# Patient Record
Sex: MALE | Race: WHITE | NOT HISPANIC OR LATINO | URBAN - METROPOLITAN AREA
[De-identification: names, ages, dates, MRNs, and addresses within clinical notes are randomized per-mention and may not be internally consistent; named-entity substitution may affect disease eponyms.]

---

## 2022-03-01 ENCOUNTER — EMERGENCY (EMERGENCY)
Facility: HOSPITAL | Age: 88
LOS: 1 days | Discharge: HOME | End: 2022-03-01
Attending: EMERGENCY MEDICINE
Payer: MEDICARE

## 2022-03-01 VITALS
OXYGEN SATURATION: 98 % | HEART RATE: 90 BPM | RESPIRATION RATE: 18 BRPM | DIASTOLIC BLOOD PRESSURE: 55 MMHG | TEMPERATURE: 99 F | SYSTOLIC BLOOD PRESSURE: 120 MMHG

## 2022-03-01 VITALS
OXYGEN SATURATION: 98 % | TEMPERATURE: 99 F | DIASTOLIC BLOOD PRESSURE: 70 MMHG | RESPIRATION RATE: 18 BRPM | SYSTOLIC BLOOD PRESSURE: 154 MMHG | HEART RATE: 96 BPM

## 2022-03-01 DIAGNOSIS — Z95.1 PRESENCE OF AORTOCORONARY BYPASS GRAFT: Chronic | ICD-10-CM

## 2022-03-01 LAB
ALBUMIN SERPL ELPH-MCNC: 3 G/DL — LOW (ref 3.5–5)
ALP SERPL-CCNC: 104 U/L — SIGNIFICANT CHANGE UP (ref 40–120)
ALT FLD-CCNC: 27 U/L DA — SIGNIFICANT CHANGE UP (ref 10–60)
ANION GAP SERPL CALC-SCNC: 7 MMOL/L — SIGNIFICANT CHANGE UP (ref 5–17)
APTT BLD: 31.1 SEC — SIGNIFICANT CHANGE UP (ref 27.5–35.5)
AST SERPL-CCNC: 22 U/L — SIGNIFICANT CHANGE UP (ref 10–40)
BASOPHILS # BLD AUTO: 0.04 K/UL — SIGNIFICANT CHANGE UP (ref 0–0.2)
BASOPHILS NFR BLD AUTO: 0.4 % — SIGNIFICANT CHANGE UP (ref 0–2)
BILIRUB SERPL-MCNC: 0.5 MG/DL — SIGNIFICANT CHANGE UP (ref 0.2–1.2)
BUN SERPL-MCNC: 29 MG/DL — HIGH (ref 7–18)
CALCIUM SERPL-MCNC: 7.9 MG/DL — LOW (ref 8.4–10.5)
CHLORIDE SERPL-SCNC: 103 MMOL/L — SIGNIFICANT CHANGE UP (ref 96–108)
CO2 SERPL-SCNC: 28 MMOL/L — SIGNIFICANT CHANGE UP (ref 22–31)
CREAT SERPL-MCNC: 1.08 MG/DL — SIGNIFICANT CHANGE UP (ref 0.5–1.3)
EGFR: 61 ML/MIN/1.73M2 — SIGNIFICANT CHANGE UP
EOSINOPHIL # BLD AUTO: 0.07 K/UL — SIGNIFICANT CHANGE UP (ref 0–0.5)
EOSINOPHIL NFR BLD AUTO: 0.8 % — SIGNIFICANT CHANGE UP (ref 0–6)
GLUCOSE SERPL-MCNC: 108 MG/DL — HIGH (ref 70–99)
HCT VFR BLD CALC: 27.1 % — LOW (ref 39–50)
HGB BLD-MCNC: 9 G/DL — LOW (ref 13–17)
IMM GRANULOCYTES NFR BLD AUTO: 0.3 % — SIGNIFICANT CHANGE UP (ref 0–1.5)
INR BLD: 1.65 RATIO — HIGH (ref 0.88–1.16)
LYMPHOCYTES # BLD AUTO: 1.63 K/UL — SIGNIFICANT CHANGE UP (ref 1–3.3)
LYMPHOCYTES # BLD AUTO: 17.9 % — SIGNIFICANT CHANGE UP (ref 13–44)
MCHC RBC-ENTMCNC: 32.8 PG — SIGNIFICANT CHANGE UP (ref 27–34)
MCHC RBC-ENTMCNC: 33.2 GM/DL — SIGNIFICANT CHANGE UP (ref 32–36)
MCV RBC AUTO: 98.9 FL — SIGNIFICANT CHANGE UP (ref 80–100)
MONOCYTES # BLD AUTO: 0.97 K/UL — HIGH (ref 0–0.9)
MONOCYTES NFR BLD AUTO: 10.7 % — SIGNIFICANT CHANGE UP (ref 2–14)
NEUTROPHILS # BLD AUTO: 6.35 K/UL — SIGNIFICANT CHANGE UP (ref 1.8–7.4)
NEUTROPHILS NFR BLD AUTO: 69.9 % — SIGNIFICANT CHANGE UP (ref 43–77)
NRBC # BLD: 0 /100 WBCS — SIGNIFICANT CHANGE UP (ref 0–0)
NT-PROBNP SERPL-SCNC: 3533 PG/ML — HIGH (ref 0–450)
PLATELET # BLD AUTO: 264 K/UL — SIGNIFICANT CHANGE UP (ref 150–400)
POTASSIUM SERPL-MCNC: 4.5 MMOL/L — SIGNIFICANT CHANGE UP (ref 3.5–5.3)
POTASSIUM SERPL-SCNC: 4.5 MMOL/L — SIGNIFICANT CHANGE UP (ref 3.5–5.3)
PROT SERPL-MCNC: 6.3 G/DL — SIGNIFICANT CHANGE UP (ref 6–8.3)
PROTHROM AB SERPL-ACNC: 19.7 SEC — HIGH (ref 10.5–13.4)
RBC # BLD: 2.74 M/UL — LOW (ref 4.2–5.8)
RBC # FLD: 12.6 % — SIGNIFICANT CHANGE UP (ref 10.3–14.5)
SODIUM SERPL-SCNC: 138 MMOL/L — SIGNIFICANT CHANGE UP (ref 135–145)
TROPONIN I, HIGH SENSITIVITY RESULT: 11.3 NG/L — SIGNIFICANT CHANGE UP
TROPONIN I, HIGH SENSITIVITY RESULT: 14.6 NG/L — SIGNIFICANT CHANGE UP
WBC # BLD: 9.09 K/UL — SIGNIFICANT CHANGE UP (ref 3.8–10.5)
WBC # FLD AUTO: 9.09 K/UL — SIGNIFICANT CHANGE UP (ref 3.8–10.5)

## 2022-03-01 PROCEDURE — 99285 EMERGENCY DEPT VISIT HI MDM: CPT | Mod: 25

## 2022-03-01 PROCEDURE — 85025 COMPLETE CBC W/AUTO DIFF WBC: CPT

## 2022-03-01 PROCEDURE — 85730 THROMBOPLASTIN TIME PARTIAL: CPT

## 2022-03-01 PROCEDURE — 84484 ASSAY OF TROPONIN QUANT: CPT

## 2022-03-01 PROCEDURE — 99283 EMERGENCY DEPT VISIT LOW MDM: CPT

## 2022-03-01 PROCEDURE — 85610 PROTHROMBIN TIME: CPT

## 2022-03-01 PROCEDURE — 80053 COMPREHEN METABOLIC PANEL: CPT

## 2022-03-01 PROCEDURE — 71045 X-RAY EXAM CHEST 1 VIEW: CPT

## 2022-03-01 PROCEDURE — 83880 ASSAY OF NATRIURETIC PEPTIDE: CPT

## 2022-03-01 PROCEDURE — 36415 COLL VENOUS BLD VENIPUNCTURE: CPT

## 2022-03-01 PROCEDURE — 71045 X-RAY EXAM CHEST 1 VIEW: CPT | Mod: 26

## 2022-03-01 PROCEDURE — 93005 ELECTROCARDIOGRAM TRACING: CPT

## 2022-03-01 NOTE — ED PROVIDER NOTE - PATIENT PORTAL LINK FT
You can access the FollowMyHealth Patient Portal offered by [facility] by registering at the following website: [email or web address]. By joining [employer]’s FollowMyHealth portal, you will also be able to view your health information using other applications (apps) compatible with our system.

## 2022-03-01 NOTE — ED PROVIDER NOTE - OBJECTIVE STATEMENT
89 y/o man, h/o HTN, A-fib, CABG, sent from [place] for c/o chest pain.  Pt denies any chest pain or any symptoms on eval here.  No SOB/fever/cough.

## 2022-03-01 NOTE — ED PROVIDER NOTE - CLINICAL SUMMARY MEDICAL DECISION MAKING FREE TEXT BOX
89 y/o man, h/o HTN, A-fib, CABG, sent from [place] for c/o chest pain.  Pt denies any chest pain or any symptoms on eval here--labs, EKG, CXR, reassess.

## 2022-03-01 NOTE — ED PROVIDER NOTE - PROGRESS NOTE DETAILS
Pt resting comfortably, denies any pain or symptoms while here, and normal and calm behavior.  Will arrange ambulance back to [place]. Pt resting comfortably, denies any pain or symptoms while here, and normal and calm behavior.  Spoke with Pt's daughter, Siomara.  Will arrange ambulance back to [place].

## 2022-03-01 NOTE — ED ADULT NURSE NOTE - NSIMPLEMENTINTERV_GEN_ALL_ED
Implemented All Fall with Harm Risk Interventions:  [place] to call system. Call bell, personal items and telephone within reach. Instruct patient to call for assistance. Room bathroom lighting operational. Non-slip footwear when patient is off stretcher. Physically safe environment: no spills, clutter or unnecessary equipment. Stretcher in lowest position, wheels locked, appropriate side rails in place. Provide visual cue, wrist band, yellow gown, etc. Monitor gait and stability. Monitor for mental status changes and reorient to person, place, and time. Review medications for side effects contributing to fall risk. Reinforce activity limits and safety measures with patient and family. Provide visual clues: red socks.

## 2022-03-01 NOTE — ED PROVIDER NOTE - NSFOLLOWUPINSTRUCTIONS_ED_ALL_ED_FT
Nonspecific Chest Pain, Adult      Chest pain can be caused by many different conditions. It can be caused by a condition that is life-threatening and requires treatment right away. It can also be caused by something that is not life-threatening. If you have chest pain, it can be hard to know the difference, so it is important to get help right away to make sure that you do not have a serious condition.    Some life-threatening causes of chest pain include:  •Heart attack.      •A tear in the body's main blood vessel (aortic dissection).      •Inflammation around your heart (pericarditis).      •A problem in the lungs, such as a blood clot (pulmonary embolism) or a collapsed lung (pneumothorax).      Some non life-threatening causes of chest pain include:  •Heartburn.      •Anxiety or stress.      •Damage to the bones, muscles, and cartilage that make up your chest wall.      •Pneumonia or bronchitis.      •Shingles infection (varicella-zoster virus).      Chest pain can feel like:  •Pain or discomfort on the surface of your chest or deep in your chest.      •Crushing, pressure, aching, or squeezing pain.      •Burning or tingling.      •Dull or sharp pain that is worse when you move, cough, or take a deep breath.      •Pain or discomfort that is also felt in your back, neck, jaw, shoulder, or arm, or pain that spreads to any of these areas.      Your chest pain may come and go. It may also be constant. Your health care provider will do lab tests and other studies to find the cause of your pain. Treatment will depend on the cause of your chest pain.      Follow these instructions at home:    Medicines     •Take over-the-counter and prescription medicines only as told by your health care provider.      •If you were prescribed an antibiotic, take it as told by your health care provider. Do not stop taking the antibiotic even if you start to feel better.        Lifestyle      •Rest as directed by your health care provider.      • Do not use any products that contain nicotine or tobacco, such as cigarettes and e-cigarettes. If you need help quitting, ask your health care provider.      • Do not drink alcohol.    •Make healthy lifestyle choices as recommended. These may include:  •Getting regular exercise. Ask your health care provider to suggest some activities that are safe for you.      •Eating a heart-healthy diet. This includes plenty of fresh fruits and vegetables, whole grains, low-fat (lean) protein, and low-fat dairy products. A dietitian can help you find healthy eating options.      •Maintaining a healthy weight.      •Managing any other health conditions you have, such as high blood pressure (hypertension) or diabetes.      •Reducing stress, such as with yoga or relaxation techniques.        General instructions     •Pay attention to any changes in your symptoms. Tell your health care provider about them or any new symptoms.      •Avoid any activities that cause chest pain.      •Keep all follow-up visits as told by your health care provider. This is important. This includes visits for any further testing if your chest pain does not go away.        Contact a health care provider if:    •Your chest pain does not go away.      •You feel depressed.      •You have a fever.        Get help right away if:    •Your chest pain gets worse.      •You have a cough that gets worse, or you cough up blood.      •You have severe pain in your abdomen.      •You faint.      •You have sudden, unexplained chest discomfort.      •You have sudden, unexplained discomfort in your arms, back, neck, or jaw.      •You have shortness of breath at any time.      •You suddenly start to sweat, or your skin gets clammy.      •You feel nausea or you vomit.      •You suddenly feel lightheaded or dizzy.      •You have severe weakness, or unexplained weakness or fatigue.      •Your heart begins to beat quickly, or it feels like it is skipping beats.      These symptoms may represent a serious problem that is an emergency. Do not wait to see if the symptoms will go away. Get medical help right away. Call your local emergency services (911 in the [place] Do not drive yourself to the hospital.       Summary    •Chest pain can be caused by a condition that is serious and requires urgent treatment. It may also be caused by something that is not life-threatening.      •If you have chest pain, it is very important to see your health care provider. Your health care provider may do lab tests and other studies to find the cause of your pain.      •Follow your health care provider's instructions on taking medicines, making lifestyle changes, and getting emergency treatment if symptoms become worse.      •Keep all follow-up visits as told by your health care provider. This includes visits for any further testing if your chest pain does not go away.      This information is not intended to replace advice given to you by your health care provider. Make sure you discuss any questions you have with your health care provider.      Document Revised: 06/20/2019 Document Reviewed: 06/20/2019    Elsevier Patient Education © 2021 [employer]

## 2022-06-05 ENCOUNTER — EMERGENCY (EMERGENCY)
Facility: HOSPITAL | Age: 88
LOS: 1 days | Discharge: HOME | End: 2022-06-05
Attending: EMERGENCY MEDICINE
Payer: MEDICARE

## 2022-06-05 VITALS
WEIGHT: 115.08 LBS | TEMPERATURE: 98 F | RESPIRATION RATE: 17 BRPM | DIASTOLIC BLOOD PRESSURE: 71 MMHG | HEART RATE: 68 BPM | HEIGHT: 67 IN | OXYGEN SATURATION: 97 % | SYSTOLIC BLOOD PRESSURE: 170 MMHG

## 2022-06-05 DIAGNOSIS — Z95.1 PRESENCE OF AORTOCORONARY BYPASS GRAFT: Chronic | ICD-10-CM

## 2022-06-05 PROBLEM — Z86.79 PERSONAL HISTORY OF OTHER DISEASES OF THE CIRCULATORY SYSTEM: Chronic | Status: ACTIVE | Noted: 2022-03-01

## 2022-06-05 LAB
ALBUMIN SERPL ELPH-MCNC: 3.6 G/DL — SIGNIFICANT CHANGE UP (ref 3.5–5)
ALP SERPL-CCNC: 102 U/L — SIGNIFICANT CHANGE UP (ref 40–120)
ALT FLD-CCNC: 18 U/L DA — SIGNIFICANT CHANGE UP (ref 10–60)
ANION GAP SERPL CALC-SCNC: 4 MMOL/L — LOW (ref 5–17)
AST SERPL-CCNC: 17 U/L — SIGNIFICANT CHANGE UP (ref 10–40)
BASOPHILS # BLD AUTO: 0.08 K/UL — SIGNIFICANT CHANGE UP (ref 0–0.2)
BASOPHILS NFR BLD AUTO: 1.1 % — SIGNIFICANT CHANGE UP (ref 0–2)
BILIRUB SERPL-MCNC: 0.5 MG/DL — SIGNIFICANT CHANGE UP (ref 0.2–1.2)
BUN SERPL-MCNC: 25 MG/DL — HIGH (ref 7–18)
CALCIUM SERPL-MCNC: 8.7 MG/DL — SIGNIFICANT CHANGE UP (ref 8.4–10.5)
CHLORIDE SERPL-SCNC: 108 MMOL/L — SIGNIFICANT CHANGE UP (ref 96–108)
CO2 SERPL-SCNC: 24 MMOL/L — SIGNIFICANT CHANGE UP (ref 22–31)
CREAT SERPL-MCNC: 1.3 MG/DL — SIGNIFICANT CHANGE UP (ref 0.5–1.3)
EGFR: 48 ML/MIN/1.73M2 — LOW
EOSINOPHIL # BLD AUTO: 0.16 K/UL — SIGNIFICANT CHANGE UP (ref 0–0.5)
EOSINOPHIL NFR BLD AUTO: 2.3 % — SIGNIFICANT CHANGE UP (ref 0–6)
GLUCOSE SERPL-MCNC: 96 MG/DL — SIGNIFICANT CHANGE UP (ref 70–99)
HCT VFR BLD CALC: 28 % — LOW (ref 39–50)
HGB BLD-MCNC: 9.4 G/DL — LOW (ref 13–17)
IMM GRANULOCYTES NFR BLD AUTO: 0.4 % — SIGNIFICANT CHANGE UP (ref 0–1.5)
LYMPHOCYTES # BLD AUTO: 1.98 K/UL — SIGNIFICANT CHANGE UP (ref 1–3.3)
LYMPHOCYTES # BLD AUTO: 28.1 % — SIGNIFICANT CHANGE UP (ref 13–44)
MCHC RBC-ENTMCNC: 33 PG — SIGNIFICANT CHANGE UP (ref 27–34)
MCHC RBC-ENTMCNC: 33.6 GM/DL — SIGNIFICANT CHANGE UP (ref 32–36)
MCV RBC AUTO: 98.2 FL — SIGNIFICANT CHANGE UP (ref 80–100)
MONOCYTES # BLD AUTO: 1.05 K/UL — HIGH (ref 0–0.9)
MONOCYTES NFR BLD AUTO: 14.9 % — HIGH (ref 2–14)
NEUTROPHILS # BLD AUTO: 3.74 K/UL — SIGNIFICANT CHANGE UP (ref 1.8–7.4)
NEUTROPHILS NFR BLD AUTO: 53.2 % — SIGNIFICANT CHANGE UP (ref 43–77)
NRBC # BLD: 0 /100 WBCS — SIGNIFICANT CHANGE UP (ref 0–0)
PLATELET # BLD AUTO: 174 K/UL — SIGNIFICANT CHANGE UP (ref 150–400)
POTASSIUM SERPL-MCNC: 4.4 MMOL/L — SIGNIFICANT CHANGE UP (ref 3.5–5.3)
POTASSIUM SERPL-SCNC: 4.4 MMOL/L — SIGNIFICANT CHANGE UP (ref 3.5–5.3)
PROT SERPL-MCNC: 6.7 G/DL — SIGNIFICANT CHANGE UP (ref 6–8.3)
RBC # BLD: 2.85 M/UL — LOW (ref 4.2–5.8)
RBC # FLD: 13 % — SIGNIFICANT CHANGE UP (ref 10.3–14.5)
SODIUM SERPL-SCNC: 136 MMOL/L — SIGNIFICANT CHANGE UP (ref 135–145)
WBC # BLD: 7.04 K/UL — SIGNIFICANT CHANGE UP (ref 3.8–10.5)
WBC # FLD AUTO: 7.04 K/UL — SIGNIFICANT CHANGE UP (ref 3.8–10.5)

## 2022-06-05 PROCEDURE — 70450 CT HEAD/BRAIN W/O DYE: CPT | Mod: 26,MA

## 2022-06-05 PROCEDURE — 85025 COMPLETE CBC W/AUTO DIFF WBC: CPT

## 2022-06-05 PROCEDURE — 99284 EMERGENCY DEPT VISIT MOD MDM: CPT | Mod: 25

## 2022-06-05 PROCEDURE — 80053 COMPREHEN METABOLIC PANEL: CPT

## 2022-06-05 PROCEDURE — 71250 CT THORAX DX C-: CPT | Mod: 26,MA

## 2022-06-05 PROCEDURE — 71250 CT THORAX DX C-: CPT | Mod: MA

## 2022-06-05 PROCEDURE — 70450 CT HEAD/BRAIN W/O DYE: CPT | Mod: MA

## 2022-06-05 PROCEDURE — 99284 EMERGENCY DEPT VISIT MOD MDM: CPT

## 2022-06-05 PROCEDURE — 36415 COLL VENOUS BLD VENIPUNCTURE: CPT

## 2022-06-05 RX ORDER — ACETAMINOPHEN 500 MG
650 TABLET ORAL ONCE
Refills: 0 | Status: COMPLETED | OUTPATIENT
Start: 2022-06-05 | End: 2022-06-05

## 2022-06-05 RX ADMIN — Medication 650 MILLIGRAM(S): at 16:19

## 2022-06-05 NOTE — ED PROVIDER NOTE - OBJECTIVE STATEMENT
89 year old male with past medical history of hypertension and atrial fibrillation, on Eliquis presents to the ED accompanied by his home health aide Abby with complaints of a trip and fall last night. The patient reports that he injured the left side of his chest and is now complaining of pain with movement and deep breaths. The patient denies any other symptoms including headache, dizziness, loss of consciousness, and shortness of breath. NKDA.

## 2022-06-05 NOTE — ED PROVIDER NOTE - CLINICAL SUMMARY MEDICAL DECISION MAKING FREE TEXT BOX
89 year old male presenting to the ED status post mechanical fall with left rib pain. No signs of obvious fracture. Will obtain a CT chest and head, and labs, then reassess.

## 2022-06-05 NOTE — ED PROVIDER NOTE - PATIENT PORTAL LINK FT
You can access the FollowMyHealth Patient Portal offered by [facility] by registering at the following website: [email or web address]. By joining [facility]’s FollowMyHealth portal, you will also be able to view your health information using other applications (apps) compatible with our system.

## 2022-06-05 NOTE — ED ADULT NURSE NOTE - OBJECTIVE STATEMENT
s/p fall last night while getting beer that fell on the floor. denies any LOC r injury to head, c/ left rib pain.

## 2022-06-05 NOTE — ED PROVIDER NOTE - NSICDXPASTMEDICALHX_GEN_ALL_CORE_FT
PAST MEDICAL HISTORY:  Accelerated Hypertension     Coronary artery disease     H/O: hypertension     History of atrial fibrillation

## 2022-06-05 NOTE — ED PROVIDER NOTE - NSICDXPASTSURGICALHX_GEN_ALL_CORE_FT
PAST SURGICAL HISTORY:  History of coronary artery bypass graft     S/P CABG (coronary artery bypass graft)

## 2023-01-03 ENCOUNTER — INPATIENT (INPATIENT)
Facility: HOSPITAL | Age: 89
LOS: 2 days | Discharge: HOME | DRG: 291 | End: 2023-01-06
Attending: INTERNAL MEDICINE | Admitting: INTERNAL MEDICINE
Payer: MEDICARE

## 2023-01-03 VITALS
OXYGEN SATURATION: 97 % | TEMPERATURE: 98 F | RESPIRATION RATE: 18 BRPM | DIASTOLIC BLOOD PRESSURE: 103 MMHG | SYSTOLIC BLOOD PRESSURE: 219 MMHG | HEART RATE: 78 BPM | WEIGHT: 149.91 LBS
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
## 2023-01-03 DIAGNOSIS — I48.20 CHRONIC ATRIAL FIBRILLATION, UNSPECIFIED: ICD-10-CM

## 2023-01-03 DIAGNOSIS — Z95.1 PRESENCE OF AORTOCORONARY BYPASS GRAFT: Chronic | ICD-10-CM

## 2023-01-03 DIAGNOSIS — N40.0 BENIGN PROSTATIC HYPERPLASIA WITHOUT LOWER URINARY TRACT SYMPTOMS: ICD-10-CM

## 2023-01-03 DIAGNOSIS — I16.1 HYPERTENSIVE EMERGENCY: ICD-10-CM

## 2023-01-03 DIAGNOSIS — J18.9 PNEUMONIA, UNSPECIFIED ORGANISM: ICD-10-CM

## 2023-01-03 DIAGNOSIS — J96.01 ACUTE RESPIRATORY FAILURE WITH HYPOXIA: ICD-10-CM

## 2023-01-03 DIAGNOSIS — I10 ESSENTIAL (PRIMARY) HYPERTENSION: ICD-10-CM

## 2023-01-03 DIAGNOSIS — Z29.9 ENCOUNTER FOR PROPHYLACTIC MEASURES, UNSPECIFIED: ICD-10-CM

## 2023-01-03 DIAGNOSIS — I63.9 CEREBRAL INFARCTION, UNSPECIFIED: ICD-10-CM

## 2023-01-03 LAB
ALBUMIN SERPL ELPH-MCNC: 3.5 G/DL — SIGNIFICANT CHANGE UP (ref 3.5–5)
ALP SERPL-CCNC: 92 U/L — SIGNIFICANT CHANGE UP (ref 40–120)
ALT FLD-CCNC: 24 U/L DA — SIGNIFICANT CHANGE UP (ref 10–60)
ANION GAP SERPL CALC-SCNC: 9 MMOL/L — SIGNIFICANT CHANGE UP (ref 5–17)
APPEARANCE UR: CLEAR — SIGNIFICANT CHANGE UP
APTT BLD: 42.4 SEC — HIGH (ref 27.5–35.5)
AST SERPL-CCNC: 19 U/L — SIGNIFICANT CHANGE UP (ref 10–40)
BASOPHILS # BLD AUTO: 0.08 K/UL — SIGNIFICANT CHANGE UP (ref 0–0.2)
BASOPHILS NFR BLD AUTO: 1.2 % — SIGNIFICANT CHANGE UP (ref 0–2)
BILIRUB SERPL-MCNC: 0.8 MG/DL — SIGNIFICANT CHANGE UP (ref 0.2–1.2)
BILIRUB UR-MCNC: NEGATIVE — SIGNIFICANT CHANGE UP
BUN SERPL-MCNC: 21 MG/DL — HIGH (ref 7–18)
CALCIUM SERPL-MCNC: 9.1 MG/DL — SIGNIFICANT CHANGE UP (ref 8.4–10.5)
CHLORIDE SERPL-SCNC: 108 MMOL/L — SIGNIFICANT CHANGE UP (ref 96–108)
CO2 SERPL-SCNC: 25 MMOL/L — SIGNIFICANT CHANGE UP (ref 22–31)
COLOR SPEC: YELLOW — SIGNIFICANT CHANGE UP
CREAT SERPL-MCNC: 1.24 MG/DL — SIGNIFICANT CHANGE UP (ref 0.5–1.3)
DIFF PNL FLD: ABNORMAL
EGFR: 51 ML/MIN/1.73M2 — LOW
EOSINOPHIL # BLD AUTO: 0.1 K/UL — SIGNIFICANT CHANGE UP (ref 0–0.5)
EOSINOPHIL NFR BLD AUTO: 1.5 % — SIGNIFICANT CHANGE UP (ref 0–6)
FLUAV AG NPH QL: SIGNIFICANT CHANGE UP
FLUBV AG NPH QL: SIGNIFICANT CHANGE UP
GLUCOSE SERPL-MCNC: 107 MG/DL — HIGH (ref 70–99)
GLUCOSE UR QL: NEGATIVE — SIGNIFICANT CHANGE UP
HCT VFR BLD CALC: 30.3 % — LOW (ref 39–50)
HGB BLD-MCNC: 9.8 G/DL — LOW (ref 13–17)
IMM GRANULOCYTES NFR BLD AUTO: 0.3 % — SIGNIFICANT CHANGE UP (ref 0–0.9)
INR BLD: 1.45 RATIO — HIGH (ref 0.88–1.16)
KETONES UR-MCNC: NEGATIVE — SIGNIFICANT CHANGE UP
LACTATE SERPL-SCNC: 1.5 MMOL/L — SIGNIFICANT CHANGE UP (ref 0.7–2)
LEUKOCYTE ESTERASE UR-ACNC: NEGATIVE — SIGNIFICANT CHANGE UP
LYMPHOCYTES # BLD AUTO: 1.32 K/UL — SIGNIFICANT CHANGE UP (ref 1–3.3)
LYMPHOCYTES # BLD AUTO: 20.1 % — SIGNIFICANT CHANGE UP (ref 13–44)
MCHC RBC-ENTMCNC: 32.3 GM/DL — SIGNIFICANT CHANGE UP (ref 32–36)
MCHC RBC-ENTMCNC: 33.4 PG — SIGNIFICANT CHANGE UP (ref 27–34)
MCV RBC AUTO: 103.4 FL — HIGH (ref 80–100)
MONOCYTES # BLD AUTO: 0.65 K/UL — SIGNIFICANT CHANGE UP (ref 0–0.9)
MONOCYTES NFR BLD AUTO: 9.9 % — SIGNIFICANT CHANGE UP (ref 2–14)
NEUTROPHILS # BLD AUTO: 4.39 K/UL — SIGNIFICANT CHANGE UP (ref 1.8–7.4)
NEUTROPHILS NFR BLD AUTO: 67 % — SIGNIFICANT CHANGE UP (ref 43–77)
NITRITE UR-MCNC: NEGATIVE — SIGNIFICANT CHANGE UP
NRBC # BLD: 0 /100 WBCS — SIGNIFICANT CHANGE UP (ref 0–0)
NT-PROBNP SERPL-SCNC: 4274 PG/ML — HIGH (ref 0–450)
PH UR: 5 — SIGNIFICANT CHANGE UP (ref 5–8)
PLATELET # BLD AUTO: 160 K/UL — SIGNIFICANT CHANGE UP (ref 150–400)
POTASSIUM SERPL-MCNC: 4.7 MMOL/L — SIGNIFICANT CHANGE UP (ref 3.5–5.3)
POTASSIUM SERPL-SCNC: 4.7 MMOL/L — SIGNIFICANT CHANGE UP (ref 3.5–5.3)
PROT SERPL-MCNC: 7 G/DL — SIGNIFICANT CHANGE UP (ref 6–8.3)
PROT UR-MCNC: 15
PROTHROM AB SERPL-ACNC: 17.3 SEC — HIGH (ref 10.5–13.4)
RBC # BLD: 2.93 M/UL — LOW (ref 4.2–5.8)
RBC # FLD: 13.4 % — SIGNIFICANT CHANGE UP (ref 10.3–14.5)
SARS-COV-2 RNA SPEC QL NAA+PROBE: SIGNIFICANT CHANGE UP
SODIUM SERPL-SCNC: 142 MMOL/L — SIGNIFICANT CHANGE UP (ref 135–145)
SP GR SPEC: 1.01 — SIGNIFICANT CHANGE UP (ref 1.01–1.02)
TROPONIN I, HIGH SENSITIVITY RESULT: 37.2 NG/L — SIGNIFICANT CHANGE UP
UROBILINOGEN FLD QL: NEGATIVE — SIGNIFICANT CHANGE UP
WBC # BLD: 6.56 K/UL — SIGNIFICANT CHANGE UP (ref 3.8–10.5)
WBC # FLD AUTO: 6.56 K/UL — SIGNIFICANT CHANGE UP (ref 3.8–10.5)

## 2023-01-03 PROCEDURE — 71045 X-RAY EXAM CHEST 1 VIEW: CPT | Mod: 26

## 2023-01-03 PROCEDURE — 70450 CT HEAD/BRAIN W/O DYE: CPT | Mod: 26,MA

## 2023-01-03 PROCEDURE — 99285 EMERGENCY DEPT VISIT HI MDM: CPT

## 2023-01-03 RX ORDER — METOPROLOL TARTRATE 50 MG
25 TABLET ORAL DAILY
Refills: 0 | Status: DISCONTINUED | OUTPATIENT
Start: 2023-01-03 | End: 2023-01-06

## 2023-01-03 RX ORDER — FUROSEMIDE 40 MG
20 TABLET ORAL DAILY
Refills: 0 | Status: DISCONTINUED | OUTPATIENT
Start: 2023-01-03 | End: 2023-01-03

## 2023-01-03 RX ORDER — ACETAMINOPHEN 500 MG
650 TABLET ORAL EVERY 6 HOURS
Refills: 0 | Status: DISCONTINUED | OUTPATIENT
Start: 2023-01-03 | End: 2023-01-06

## 2023-01-03 RX ORDER — CEFTRIAXONE 500 MG/1
1000 INJECTION, POWDER, FOR SOLUTION INTRAMUSCULAR; INTRAVENOUS ONCE
Refills: 0 | Status: COMPLETED | OUTPATIENT
Start: 2023-01-03 | End: 2023-01-03

## 2023-01-03 RX ORDER — LANOLIN ALCOHOL/MO/W.PET/CERES
3 CREAM (GRAM) TOPICAL AT BEDTIME
Refills: 0 | Status: DISCONTINUED | OUTPATIENT
Start: 2023-01-03 | End: 2023-01-06

## 2023-01-03 RX ORDER — TAMSULOSIN HYDROCHLORIDE 0.4 MG/1
0.4 CAPSULE ORAL AT BEDTIME
Refills: 0 | Status: DISCONTINUED | OUTPATIENT
Start: 2023-01-03 | End: 2023-01-06

## 2023-01-03 RX ORDER — APIXABAN 2.5 MG/1
5 TABLET, FILM COATED ORAL EVERY 12 HOURS
Refills: 0 | Status: DISCONTINUED | OUTPATIENT
Start: 2023-01-04 | End: 2023-01-06

## 2023-01-03 RX ORDER — AMLODIPINE BESYLATE 2.5 MG/1
2.5 TABLET ORAL DAILY
Refills: 0 | Status: DISCONTINUED | OUTPATIENT
Start: 2023-01-03 | End: 2023-01-05

## 2023-01-03 RX ADMIN — CEFTRIAXONE 100 MILLIGRAM(S): 500 INJECTION, POWDER, FOR SOLUTION INTRAMUSCULAR; INTRAVENOUS at 15:27

## 2023-01-03 RX ADMIN — AMLODIPINE BESYLATE 2.5 MILLIGRAM(S): 2.5 TABLET ORAL at 23:18

## 2023-01-03 RX ADMIN — Medication 25 MILLIGRAM(S): at 23:18

## 2023-01-03 RX ADMIN — Medication 20 MILLIGRAM(S): at 15:27

## 2023-01-03 NOTE — H&P ADULT - NSHPPHYSICALEXAM_GEN_ALL_CORE
Vital Signs Last 24 Hrs  T(C): 36.5 (03 Jan 2023 15:39), Max: 36.7 (03 Jan 2023 11:35)  T(F): 97.7 (03 Jan 2023 15:39), Max: 98 (03 Jan 2023 11:35)  HR: 76 (03 Jan 2023 19:05) (72 - 78)  BP: 194/76 (03 Jan 2023 19:05) (194/76 - 219/103)  BP(mean): --  RR: 18 (03 Jan 2023 19:05) (18 - 20)  SpO2: 92% (03 Jan 2023 19:05) (92% - 98%)    Parameters below as of 03 Jan 2023 19:05  Patient On (Oxygen Delivery Method): nasal cannula  O2 Flow (L/min): 2    GENERAL: NAD, lying in bed comfortably  HEAD:  Atraumatic, Normocephalic  EYES: EOMI, PERRLA, conjunctiva and sclera clear  ENT: Moist mucous membranes  NECK: Supple, No JVD  CHEST/LUNG: Clear to auscultation bilaterally; No rales, rhonchi, wheezing, or rubs. Unlabored respirations (+) 2L saturating %  HEART: (+) Irregular rhythm, but rate controlled  ABDOMEN: Bowel sounds present; Soft, Nontender, Nondistended.  EXTREMITIES:  2+ Peripheral Pulses, brisk capillary refill. No clubbing, cyanosis, or edema  NERVOUS SYSTEM:  Alert & Oriented X2-3, (+) significant hearing impairment  MSK: FROM all 4 extremities, full and equal strength  SKIN: No rashes or lesions

## 2023-01-03 NOTE — ED PROVIDER NOTE - OBJECTIVE STATEMENT
89 year old male with hypertension (on Eliquis) is brought into ED by home attendant for altered mental status and low O2 level today. Home attendant reports that patient's wife fell yesterday, and as he was helping her up, the patient himself fell. Home attendant states that today patient appeared to be a little lethargic and had trouble breathing, so she called 911. As far as attendant is aware, patient is DNR/DNI. NKDA. 89 year old male with hypertension (on Eliquis) is brought into ED by home attendant for altered mental status and low O2 level today. Home attendant reports that patient's wife fell yesterday, and as he was helping her up, the patient himself fell. Home attendant states that today patient appeared to be a little lethargic and had trouble breathing, so she called 911. As far as attendant is aware, patient is DNR/DNI. NKDA. Yesterday patient had some trouble breathing as well.   PT has history  of sundowning at night and can climb out.

## 2023-01-03 NOTE — H&P ADULT - PROBLEM SELECTOR PLAN 4
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

## 2023-01-03 NOTE — H&P ADULT - PROBLEM SELECTOR PLAN 3
s/p mechanical fall after trying to help wife up who fell before   CTH shows chronic microvascular changes and right cerebellar infarcts, no change from previous imaging   Fall Precaution   f/u TSH , lipid profile , hgb a1c  PT consulted  CM consulted for increase HHA

## 2023-01-03 NOTE — H&P ADULT - ASSESSMENT
89M coming form home, ambulates with RW, and limited HHA w/ PMHx BPH, HLD, GERD, CAD s/p CABG, CVA, chronic Afib (on ELIquis), and HTN brought in by HHA for AMS and SOB with fall yesterday. Admitted to [facility] for AHRF likely 2/2  HTN emergency 219/103 in the setting of medication noncompliance. CXR shows b/l pleural effusion R>L, concern for parapneumonic effusion 2/2 malignancy shown in prior CT chest in June 2022.   GOC: DNR/DNI 89M coming form home, ambulates with RW, and limited HHA w/ PMHx BPH, HLD, GERD, CAD s/p CABG, CVA, chronic Afib (on ELIquis), and HTN brought in by HHA for AMS and SOB with fall yesterday. Admitted to [facility] for AHRF likely 2/2  HTN emergency 219/103 in the setting of medication noncompliance. CXR shows b/l pleural effusion R>L, concern for parapneumonic effusion 2/2 malignancy shown in prior CT chest in June 2022.   GOC: DNR/DNI.      PRIMARY TEAM TO CONFIRM MEDS IN THE AM.

## 2023-01-03 NOTE — H&P ADULT - PROBLEM SELECTOR PLAN 5
h/o BPH take flomax  c/w home meds h/o CVA in the past with residual deficients, ambulates with RW and significant hearing impairment in the left ear  likely in the setting of chronic afib, currently taking Eliquis 5 BID  and Toprol 25mg    CTH shows chronic microvascular changes and right cerebellar infarcts, no change from previous imaging   c/w home meds

## 2023-01-03 NOTE — ED PROVIDER NOTE - CARE PLAN
1 Principal Discharge DX:	Fall   Principal Discharge DX:	PNA (pneumonia)  Secondary Diagnosis:	Fluid overload  Secondary Diagnosis:	Acute respiratory failure with hypoxia   No

## 2023-01-03 NOTE — ED PROVIDER NOTE - CLINICAL SUMMARY MEDICAL DECISION MAKING FREE TEXT BOX
Patient with altered mental status and fall. Low O2 level. Will obtain labs and CT scan and reassess.

## 2023-01-03 NOTE — H&P ADULT - PROBLEM SELECTOR PLAN 1
p/w SOB x2d  h/o HF, hb 9.8 , proBNP 4274  likely in the setting of HTN emergency in the setting of medication non-compliance   CXR b/l pleural effusion R>L  incentive spirometry and Supple O2 prn   s/p Lasix 20mg IVP in the ED   c/w lasix 20 mg PO   Cardio consult Dr. Rolando LAKE: DNR/ DNI, updated molst in the chart p/w SOB x2d  h/o HF, hb 9.8 , proBNP 4274  likely in the setting of HTN emergency in the setting of medication non-compliance   CXR: b/l pleural effusion R>L, concern for parapneumonic effusion 2/2 malignancy shown in prior CT chest in 06/2022.   incentive spirometry and Supple O2 prn   s/p Lasix 20mg IVP xs1 in the ED   f/u repeat CT chest non-cont to compare RLL malignancy on CT chest 06/2022  will hold off on abx for now no leukocytosis and no fever  titrate O2 as tolerated   Cardio consult Dr. Rolando LAKE: DNR/ DNI, updated molst in the chart

## 2023-01-03 NOTE — H&P ADULT - PROBLEM SELECTOR PLAN 2
p/w hypertensive emergency 219/103, limited ROS d/t hearing impairment, reports SOB    h/o HTN takes isosorbide dinitrate 30mg, Toprol ER 25mg and Amlodipine 2.5mg, unclear medication compliance    BNP 4274, Trop negative x1   CXR b/l pleural effusion R>L  Hypoxia requiring supp O2, currently 2L NC  Cr mildly elevated 1.4 but not NARINDER (baseline 1.2)  c/w home medications of amlodipine , Toprol with parameters   Tele   Will continue to monitor blood pressure  C/w neurochecks  F/u Echo  Cardiologist consulted Rolando Hines p/w hypertensive emergency 219/103, limited ROS d/t hearing impairment, reports SOB    h/o HTN takes isosorbide dinitrate 30mg, Toprol ER 25mg and Amlodipine 2.5mg, unclear medication compliance   EKG: Afib, rate controlled, but prolonged Qc 533 - AVOID meds that can prolong qtc   BNP 4274, Trop negative x1   CXR b/l pleural effusion R>L  Hypoxia requiring supp O2, currently 2L NC  Cr mildly elevated 1.4 but not NARINDER (baseline 1.2)  c/w home medications of amlodipine and Toprol with holding parameters   admit to Tele   Will continue to monitor blood pressure  F/u Echo  Cardiologist consulted Rolando Hines

## 2023-01-03 NOTE — ED PROVIDER NOTE - PROGRESS NOTE DETAILS
CABG 19 year ago  [phone] Siomara daughter in law  tamsulosin .4mg  atorvastatin 40mg  omeprazole 40mg  metoprolol ER succ 25mg  paroxitene 10mg  isosorbide dinitrate 30mg  amlodipine 2.5mg  eliquis 5mg tablet (for stoke years ago) patient awake. sat 100 on 4 liters NC, will continue to trend down. well appearing. admit to tele. xray with pneumonia and effusion

## 2023-01-04 LAB
ALBUMIN SERPL ELPH-MCNC: 3.2 G/DL — LOW (ref 3.5–5)
ALP SERPL-CCNC: 98 U/L — SIGNIFICANT CHANGE UP (ref 40–120)
ALT FLD-CCNC: 22 U/L DA — SIGNIFICANT CHANGE UP (ref 10–60)
ANION GAP SERPL CALC-SCNC: 9 MMOL/L — SIGNIFICANT CHANGE UP (ref 5–17)
AST SERPL-CCNC: 23 U/L — SIGNIFICANT CHANGE UP (ref 10–40)
BASOPHILS # BLD AUTO: 0.04 K/UL — SIGNIFICANT CHANGE UP (ref 0–0.2)
BASOPHILS NFR BLD AUTO: 0.5 % — SIGNIFICANT CHANGE UP (ref 0–2)
BILIRUB SERPL-MCNC: 0.7 MG/DL — SIGNIFICANT CHANGE UP (ref 0.2–1.2)
BUN SERPL-MCNC: 25 MG/DL — HIGH (ref 7–18)
CALCIUM SERPL-MCNC: 9.1 MG/DL — SIGNIFICANT CHANGE UP (ref 8.4–10.5)
CHLORIDE SERPL-SCNC: 106 MMOL/L — SIGNIFICANT CHANGE UP (ref 96–108)
CO2 SERPL-SCNC: 25 MMOL/L — SIGNIFICANT CHANGE UP (ref 22–31)
CREAT SERPL-MCNC: 1.37 MG/DL — HIGH (ref 0.5–1.3)
CULTURE RESULTS: SIGNIFICANT CHANGE UP
EGFR: 45 ML/MIN/1.73M2 — LOW
EOSINOPHIL # BLD AUTO: 0.16 K/UL — SIGNIFICANT CHANGE UP (ref 0–0.5)
EOSINOPHIL NFR BLD AUTO: 2.2 % — SIGNIFICANT CHANGE UP (ref 0–6)
GLUCOSE SERPL-MCNC: 112 MG/DL — HIGH (ref 70–99)
HCT VFR BLD CALC: 28.1 % — LOW (ref 39–50)
HGB BLD-MCNC: 9.3 G/DL — LOW (ref 13–17)
IMM GRANULOCYTES NFR BLD AUTO: 0.3 % — SIGNIFICANT CHANGE UP (ref 0–0.9)
LYMPHOCYTES # BLD AUTO: 1.54 K/UL — SIGNIFICANT CHANGE UP (ref 1–3.3)
LYMPHOCYTES # BLD AUTO: 20.7 % — SIGNIFICANT CHANGE UP (ref 13–44)
MAGNESIUM SERPL-MCNC: 1.4 MG/DL — LOW (ref 1.6–2.6)
MCHC RBC-ENTMCNC: 33.1 GM/DL — SIGNIFICANT CHANGE UP (ref 32–36)
MCHC RBC-ENTMCNC: 33.3 PG — SIGNIFICANT CHANGE UP (ref 27–34)
MCV RBC AUTO: 100.7 FL — HIGH (ref 80–100)
MONOCYTES # BLD AUTO: 1.08 K/UL — HIGH (ref 0–0.9)
MONOCYTES NFR BLD AUTO: 14.5 % — HIGH (ref 2–14)
NEUTROPHILS # BLD AUTO: 4.59 K/UL — SIGNIFICANT CHANGE UP (ref 1.8–7.4)
NEUTROPHILS NFR BLD AUTO: 61.8 % — SIGNIFICANT CHANGE UP (ref 43–77)
NRBC # BLD: 0 /100 WBCS — SIGNIFICANT CHANGE UP (ref 0–0)
PHOSPHATE SERPL-MCNC: 4.1 MG/DL — SIGNIFICANT CHANGE UP (ref 2.5–4.5)
PLATELET # BLD AUTO: 155 K/UL — SIGNIFICANT CHANGE UP (ref 150–400)
POTASSIUM SERPL-MCNC: 3.8 MMOL/L — SIGNIFICANT CHANGE UP (ref 3.5–5.3)
POTASSIUM SERPL-SCNC: 3.8 MMOL/L — SIGNIFICANT CHANGE UP (ref 3.5–5.3)
PROT SERPL-MCNC: 6.5 G/DL — SIGNIFICANT CHANGE UP (ref 6–8.3)
RBC # BLD: 2.79 M/UL — LOW (ref 4.2–5.8)
RBC # FLD: 13 % — SIGNIFICANT CHANGE UP (ref 10.3–14.5)
SODIUM SERPL-SCNC: 140 MMOL/L — SIGNIFICANT CHANGE UP (ref 135–145)
SPECIMEN SOURCE: SIGNIFICANT CHANGE UP
WBC # BLD: 7.43 K/UL — SIGNIFICANT CHANGE UP (ref 3.8–10.5)
WBC # FLD AUTO: 7.43 K/UL — SIGNIFICANT CHANGE UP (ref 3.8–10.5)

## 2023-01-04 RX ORDER — METOPROLOL TARTRATE 50 MG
2.5 TABLET ORAL ONCE
Refills: 0 | Status: COMPLETED | OUTPATIENT
Start: 2023-01-04 | End: 2023-01-04

## 2023-01-04 RX ORDER — BNT162B2 ORIGINAL AND OMICRON BA.4/BA.5 .1125; .1125 MG/2.25ML; MG/2.25ML
0.3 INJECTION, SUSPENSION INTRAMUSCULAR ONCE
Refills: 0 | Status: DISCONTINUED | OUTPATIENT
Start: 2023-01-04 | End: 2023-01-06

## 2023-01-04 RX ORDER — OLANZAPINE 15 MG/1
2.5 TABLET, FILM COATED ORAL ONCE
Refills: 0 | Status: COMPLETED | OUTPATIENT
Start: 2023-01-04 | End: 2023-01-04

## 2023-01-04 RX ADMIN — OLANZAPINE 2.5 MILLIGRAM(S): 15 TABLET, FILM COATED ORAL at 23:29

## 2023-01-04 RX ADMIN — Medication 650 MILLIGRAM(S): at 01:38

## 2023-01-04 RX ADMIN — Medication 2.5 MILLIGRAM(S): at 17:05

## 2023-01-04 RX ADMIN — APIXABAN 5 MILLIGRAM(S): 2.5 TABLET, FILM COATED ORAL at 17:05

## 2023-01-04 RX ADMIN — Medication 10 MILLIGRAM(S): at 12:22

## 2023-01-04 RX ADMIN — APIXABAN 5 MILLIGRAM(S): 2.5 TABLET, FILM COATED ORAL at 05:52

## 2023-01-04 RX ADMIN — TAMSULOSIN HYDROCHLORIDE 0.4 MILLIGRAM(S): 0.4 CAPSULE ORAL at 22:11

## 2023-01-04 RX ADMIN — Medication 3 MILLIGRAM(S): at 02:10

## 2023-01-04 NOTE — CONSULT NOTE ADULT - ASSESSMENT
Mechanical Fall  CABG with MR with AF with CHF  [place]  Anemia  Early dementia  HLD, GERD, BPH        PLAN- observation  Echo  LASIX, Lopressor xarelto  F/U CXR  in 3 days  P/T  Pt DNR and DNI  Family aware

## 2023-01-04 NOTE — PROGRESS NOTE ADULT - PROBLEM SELECTOR PLAN 1
2/2  pleural effusion  -CXR: b/l pleural effusion R>L,   -O2 supplement as needed to maintain O2 sat>95%  no concern for pneumonia  blood cx negative   f/u pulm recs

## 2023-01-04 NOTE — PROGRESS NOTE ADULT - PROBLEM SELECTOR PLAN 2
p/w hypertensive emergency 219/103, limited ROS d/t hearing impairment, reports SOB    h/o HTN takes isosorbide dinitrate 30mg, Toprol ER 25mg and Amlodipine 2.5mg, unclear medication compliance   EKG: Afib, rate controlled, but prolonged Qc 533 - AVOID meds that can prolong qtc   BNP 4274, Trop negative x1   CXR b/l pleural effusion R>L  Hypoxia requiring supp O2, currently 2L NC  Cr mildly elevated 1.4 but not NARINDER (baseline 1.2)  c/w home medications of amlodipine and Toprol with holding parameters   admit to Tele   Will continue to monitor blood pressure  F/u Echo  Cardiologist consulted Rolando Hines RESOLVED  p/w hypertensive emergency 219/103, limited ROS d/t hearing impairment, reports SOB    h/o HTN takes isosorbide dinitrate 30mg, Toprol ER 25mg and Amlodipine 2.5mg, unclear medication compliance   EKG: Afib, rate controlled, but prolonged Qc 533 - AVOID meds that can prolong qtc   BNP 4274, Trop negative x1   CXR b/l pleural effusion R>L  Hypoxia requiring supp O2, currently 2L NC  Cr mildly elevated 1.4 but not NARINDER (baseline 1.2)  c/w home medications of amlodipine and Toprol with holding parameters   admit to Tele   Will continue to monitor blood pressure  F/u Echo  Cardiologist consulted Rolando Hines

## 2023-01-04 NOTE — CONSULT NOTE ADULT - SUBJECTIVE AND OBJECTIVE BOX
PULMONARY CONSULT NOTE      ROSARIO RUIZ  MRN-[id number]    Patient is a 89y old  Male who presents with a chief complaint of AMS, AHRF (2023 19:27)   COVID +ve  Hx chart lab and xrays reviewed  Pt examined in ER      HISTORY OF PRESENT ILLNESS:    Home Medications:  amLODIPine 2.5 mg oral tablet: 1 tab(s) orally once a day (2023 22:53)  atorvastatin 40 mg oral tablet: 1 tab(s) orally once a day (2023 22:50)  Eliquis 5 mg oral tablet: 1 tab(s) orally 2 times a day (2023 22:52)  Flomax 0.4 mg oral capsule: 1 cap(s) orally once a day (2023 22:50)  isosorbide dinitrate 20 mg oral tablet: 1 tab(s) orally 2 times  (2023 22:51)  PARoxetine 10 mg oral tablet: 1 tab(s) orally once a day (2023 22:52)  Toprol-XL 25 mg oral tablet, extended release: 1 tab(s) orally once a day (2023 22:51)      MEDICATIONS  (STANDING):  amLODIPine   Tablet 2.5 milliGRAM(s) Oral daily  apixaban 5 milliGRAM(s) Oral every 12 hours  metoprolol succinate ER 25 milliGRAM(s) Oral daily  PARoxetine 10 milliGRAM(s) Oral daily  tamsulosin 0.4 milliGRAM(s) Oral at bedtime      MEDICATIONS  (PRN):  acetaminophen     Tablet .. 650 milliGRAM(s) Oral every 6 hours PRN Temp greater or equal to 38C (100.4F), Mild Pain (1 - 3)  melatonin 3 milliGRAM(s) Oral at bedtime PRN Insomnia      Allergies    No Known Allergies    Intolerances        PAST MEDICAL & SURGICAL HISTORY:  Accelerated Hypertension      Coronary artery disease      H/O: hypertension      History of atrial fibrillation      S/P CABG (coronary artery bypass graft)      History of coronary artery bypass graft          FAMILY HISTORY:  HTN    DM   IHD   Asthma  COPD     SOCIAL HISTORY SMOKING    ETOH     DRUGS    REVIEW OF SYSTEMS:  CONSTITUTIONAL: No fever, weight loss, or fatigue   EYES: No eye pain, visual disturbances, or discharge  ENT:  No difficulty hearing, tinnitus, vertigo; No sinus or throat pain  NECK: No pain or stiffness   RESPIRATORY:  cough   wheezing   chills   hemoptysis    Shortness of Breath  CARDIOVASCULAR: No chest pain, palpitations, passing out, dizziness, or leg swelling  GASTROINTESTINAL: No abdominal or epigastric pain. No nausea, vomiting, or hematemesis; No diarrhea or constipation. No melena or hematochezia.  GENITOURINARY: No dysuria, frequency, hematuria, or incontinence  NEUROLOGICAL: No headaches, memory loss, loss of strength, numbness, or tremors  SKIN: No itching, burning, rashes, or lesions   LYMPH Nodes: No enlarged glands  ENDOCRINE: No heat or cold intolerance; No hair loss  MUSCULOSKELETAL: No joint pain or swelling; No muscle, back, or extremity pain  PSYCHIATRIC: No depression, anxiety, mood swings, or difficulty sleeping  HEME/LYMPH: No easy bruising, or bleeding gums  ALLERGY AND IMMUNOLOGIC: No hives or eczema      Vital Signs Last 24 Hrs  T(C): 36.4 (2023 08:33), Max: 36.7 (2023 11:35)  T(F): 97.5 (2023 08:33), Max: 98 (2023 11:35)  HR: 68 (2023 08:33) (60 - 78)  BP: 119/68 (2023 08:33) (119/68 - 219/103)  BP(mean): --  RR: 18 (2023 08:33) (18 - 20)  SpO2: 98% (2023 08:33) (92% - 98%)    Parameters below as of 2023 08:33  Patient On (Oxygen Delivery Method): nasal cannula  O2 Flow (L/min): 3    I&O's Detail      PHYSICAL EXAMINATION:    GENERAL: The patient is a well-developed, well-nourished in no apparent distress.   SKIN: No rashes ecchymoses or cyanosis  HEENT: Head is normocephalic and atraumatic. Extraocular muscles are intact. Mucous membranes are moist.   Neck supple LN not felt, JVP not increased  Thyroid not enlarged  Lymphatic: No lymphadenopathy  Cardiovascular:  S1 S2  heard ,RSR , JVP not increased , syst murmur at apex, No  gallop or rub  Respiratory:  Symmetrical chest wall movements Breathing vesicular , Percussion note normal no dulness   with   rales   wheeze  ABDOMEN:  Soft, Non-tender,   No  hepatosplenomegaly ,BS positive		  Extremities: Normal range of motion, No clubbing, cyanosis or edema , No calf tenderness  Vascular: Peripheral pulses palpable 2+ bilaterally  CNS: Alert and oriented x3,  Mood and affect appropriate  Cranial nerves intact  sensory intact  motor Power5/5, DTR 2+   Babinski neg    LABS:                        9.3    7.43  )-----------( 155      ( 2023 06:58 )             28.1     01-04    140  |  106  |  25<H>  ----------------------------<  112<H>  3.8   |  25  |  1.37<H>    Ca    9.1      2023 06:58  Phos  4.1     -  Mg     1.4     -04    TPro  6.5  /  Alb  3.2<L>  /  TBili  0.7  /  DBili  x   /  AST  23  /  ALT  22  /  AlkPhos  98  01-04    PT/INR - ( 2023 14:05 )   PT: 17.3 sec;   INR: 1.45 ratio         PTT - ( 2023 14:05 )  PTT:42.4 sec  Urinalysis Basic - ( 2023 16:35 )    Color: Yellow / Appearance: Clear / S.010 / pH: x  Gluc: x / Ketone: Negative  / Bili: Negative / Urobili: Negative   Blood: x / Protein: 15 / Nitrite: Negative   Leuk Esterase: Negative / RBC: 5-10 /HPF / WBC 0-2 /HPF   Sq Epi: x / Non Sq Epi: Occasional /HPF / Bacteria: Trace /HPF      Troponin I, High Sensitivity Result: 37.2 ng/L (23 @ 14:05)      Serum Pro-Brain Natriuretic Peptide: 4274 pg/mL (23 @ 14:05)    Lactate, Blood: 1.5 mmol/L (23 @ 14:05)      RADIOLOGY & ADDITIONAL STUDIES:    CXR: Rt Pl Effusion      ekg;    echo: PULMONARY CONSULT NOTE      ROSARIO RUIZ  MRN-[id number]    Patient is a 89y old  Male who presents with a chief complaint of  mechanical fall ,no LOC no fever. cough or sob  Hx chart lab and xrays reviewed  Pt examined in ER      HISTORY OF PRESENT ILLNESS:89M coming form home, ambulates with RW, and limited HHA w/ PMHx BPH, HLD, GERD, CAD s/p CABG, CVA, chronic Afib (on ELIquis), and HTN brought in by HHA for AMS and SOB with fall yesterday. Per the HHA, patient was trying to help his wife up after she fell and he fell on top of her, injuring his left arm, since then he has been not himself. HHA notes that he walks and talks, responsible for taking his own medications, however unclear if has been taking all of his medications in the evening as patient does not have HHA at night. Upon EMS arrival found to be hypoxic and placed on NRBM with elevated BP. Patient is AOx2-3 with significant hearing impairment with hearing aid in the left ear, but able to make his own decisions. Per the son, Williams Ruiz (HCP) the visiting NP has noted increasing BP but with no intervention. In the ED, patient decided to continue care with DNR/DNI, family is in agreement. ROS limited d/t hearing impairment but denies HA, chest pain, and abdominal pain. 89M coming form home, ambulates with RW, and limited HHA w/ PMHx BPH, HLD, GERD, CAD s/p CABG, CVA, chronic Afib (on ELIquis), and HTN brought in by HHA for AMS and SOB with fall yesterday. Per the HHA, patient was trying to help his wife up after she fell and he fell on top of her, injuring his left arm, since then he has been not himself. HHA notes that he walks and talks, responsible for taking his own medications, however unclear if has been taking all of his medications in the evening as patient does not have HHA at night. Upon EMS arrival found to be hypoxic and placed on NRBM with elevated BP. Patient is AOx2-3 with significant hearing impairment with hearing aid in the left ear, but able to make his own decisions. Per the son, Williams Ruiz (HCP) the visiting NP has noted increasing BP but with no intervention. In the ED, patient decided to continue care with DNR/DNI, family is in agreement. ROS limited d/t hearing impairment but denies HA, chest pain, and abdominal pain.      Home Medications:  amLODIPine 2.5 mg oral tablet: 1 tab(s) orally once a day (2023 22:53)  atorvastatin 40 mg oral tablet: 1 tab(s) orally once a day (2023 22:50)  Eliquis 5 mg oral tablet: 1 tab(s) orally 2 times a day (2023 22:52)  Flomax 0.4 mg oral capsule: 1 cap(s) orally once a day (2023 22:50)  isosorbide dinitrate 20 mg oral tablet: 1 tab(s) orally 2 times  (2023 22:51)  PARoxetine 10 mg oral tablet: 1 tab(s) orally once a day (2023 22:52)  Toprol-XL 25 mg oral tablet, extended release: 1 tab(s) orally once a day (2023 22:51)      MEDICATIONS  (STANDING):  amLODIPine   Tablet 2.5 milliGRAM(s) Oral daily  apixaban 5 milliGRAM(s) Oral every 12 hours  metoprolol succinate ER 25 milliGRAM(s) Oral daily  PARoxetine 10 milliGRAM(s) Oral daily  tamsulosin 0.4 milliGRAM(s) Oral at bedtime      MEDICATIONS  (PRN):  acetaminophen     Tablet .. 650 milliGRAM(s) Oral every 6 hours PRN Temp greater or equal to 38C (100.4F), Mild Pain (1 - 3)  melatonin 3 milliGRAM(s) Oral at bedtime PRN Insomnia      Allergies    No Known Allergies            PAST MEDICAL & SURGICAL HISTORY:  Accelerated Hypertension      Coronary artery disease      H/O: hypertension      History of atrial fibrillation      S/P CABG (coronary artery bypass graft)      History of coronary artery bypass graft          FAMILY HISTORY:  HTN --   DM--   IHD--   Asthma--  COPD--     SOCIAL HISTORY SMOKING --   ETOH--     DRUGS--    REVIEW OF SYSTEMS:  CONSTITUTIONAL: No fever, weight loss, or fatigue   EYES: No eye pain, visual disturbances, or discharge  ENT:   [place]++, no, tinnitus, vertigo; No sinus or throat pain  NECK: No pain or stiffness   RESPIRATORY:  cough--   wheezing--   chills--   hemoptysis--    Shortness of Breath--  CARDIOVASCULAR: No chest pain, palpitations, passing out, dizziness, or leg swelling  GASTROINTESTINAL: No abdominal or epigastric pain. No nausea, vomiting, or hematemesis;   LYMPH Nodes: No enlarged glands  ENDOCRINE: No heat or cold intolerance; No hair loss  MUSCULOSKELETAL: No joint pain or swelling; No muscle, back, or extremity pain  HEME/LYMPH: No easy bruising, or bleeding gums  ALLERGY AND IMMUNOLOGIC: No hives or eczema      Vital Signs Last 24 Hrs  T(C): 36.4 (2023 08:33), Max: 36.7 (2023 11:35)  T(F): 97.5 (2023 08:33), Max: 98 (2023 11:35)  HR: 68 (2023 08:33) (60 - 78)  BP: 119/68 (2023 08:33) (119/68 - 219/103)  BP(mean): --  RR: 18 (2023 08:33) (18 - 20)  SpO2: 98% (2023 08:33) (92% - 98%)    Parameters below as of 2023 08:33  Patient On (Oxygen Delivery Method): nasal cannula  O2 Flow (L/min): 3    I&O's Detail      PHYSICAL EXAMINATION:    GENERAL: The patient is a thin built w/m in no apparent distress.   SKIN: No rashes ecchymoses or cyanosis  HEENT: Head is normocephalic and atraumatic. Extraocular muscles are intact. [place]+  Neck supple LN not felt, JVP not increased  Thyroid not enlarged  Lymphatic: No lymphadenopathy  Cardiovascular:  S1 S2  heard , AF , JVP not increased , systolic  murmur at apex, No  gallop or rub  Respiratory:  Symmetrical chest wall movements Breathing vesicular , Percussion note normal no dulness   with no  rales or  wheeze  ABDOMEN:  Soft, Non-tender,  No  hepatosplenomegaly ,BS positive		  Extremities: Normal range of motion, No clubbing, cyanosis or edema , No calf tenderness, scar rt forearm  Vascular: Peripheral pulses palpable 2+ bilaterally  CNS: Alert and  responsive  [place]  Cranial nerves intact  sensory intact  motor Power5/5, DTR 2+  Babinski neg        LABS:                        9.3    7.43  )-----------( 155      ( 2023 06:58 )             28.1     -04    140  |  106  |  25<H>  ----------------------------<  112<H>  3.8   |  25  |  1.37<H>    Ca    9.1      2023 06:58  Phos  4.1       Mg     1.4         TPro  6.5  /  Alb  3.2<L>  /  TBili  0.7  /  DBili  x   /  AST  23  /  ALT  22  /  AlkPhos  98  -    PT/INR - ( 2023 14:05 )   PT: 17.3 sec;   INR: 1.45 ratio         PTT - ( 2023 14:05 )  PTT:42.4 sec  Urinalysis Basic - ( 2023 16:35 )    Color: Yellow / Appearance: Clear / S.010 / pH: x  Gluc: x / Ketone: Negative  / Bili: Negative / Urobili: Negative   Blood: x / Protein: 15 / Nitrite: Negative   Leuk Esterase: Negative / RBC: 5-10 /HPF / WBC 0-2 /HPF   Sq Epi: x / Non Sq Epi: Occasional /HPF / Bacteria: Trace /HPF      Troponin I, High Sensitivity Result: 37.2 ng/L (23 @ 14:05)      Serum Pro-Brain Natriuretic Peptide: 4274 pg/mL (23 @ 14:05)    Lactate, Blood: 1.5 mmol/L (23 @ 14:05)      RADIOLOGY & ADDITIONAL STUDIES:    CXR:  Sternotomy wires again noted.  New small to moderate size right pleural effusion . Trace left pleural effusion.     CT HEAD:  < from: CT Head No Cont (23 @ 14:25) >  Parenchymal volume loss and chronic microvascular ischemic changes are   again seen and unchanged.    Abnormal area of low-attenuation involving the high left posterior   frontal/parietal cortex is again seen which is likely compatible with an   old infarct.    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    Evaluation of the osseous structures with the appropriate window appears   normal    Minimal mucosal thickening is seen involving the posterior left ethmoid   sinus    Both mastoid and middle ear regions appear clear.    Patient is status post cataract surgery on the right side. Bilateral   scleral banding is seen.    < end of copied text >  < from: CT Head No Cont (23 @ 14:25) >  Parenchymal volume loss and chronic microvascular ischemic changes are   again seen and unchanged.    Abnormal area of low-attenuation involving the high left posterior   frontal/parietal cortex is again seen which is likely compatible with an   old infarct.    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    Evaluation of the osseous structures with the appropriate window appears   normal    Minimal mucosal thickening is seen involving the posterior left ethmoid   sinus    Both mastoid and middle ear regions appear clear.    Patient is status post cataract surgery on the right side. Bilateral   scleral banding is seen.        ekg; AF< from: 12 Lead ECG (22 @ 01:10) >  Atrial fibrillation with rapid ventricular response  Right bundle branch block

## 2023-01-04 NOTE — PATIENT PROFILE ADULT - NSPROHARDOFHEAROTHER_GEN_ALL_CORE
-Concern for oral cold sore flare however not evident on exam today--discussed may be related to underlying Sjogren's  -Recommend return to clinic if develops flare so can test w/ PCR  -Rx valacyclovir to trial --use as needed   A magnifying glass may help. Son reports that he doesn't hear anything from the right ear. Has hearing aid to left ear but it's not working as per family.

## 2023-01-04 NOTE — PROGRESS NOTE ADULT - PROBLEM SELECTOR PLAN 1
likely due to pleural effusion  -CXR: b/l pleural effusion R>L, concern for parapneumonic effusion 2/2 malignancy shown in prior CT chest in 06/2022.   -O2 supplement as needed to maintain O2 sat>95%  -f/u repeat CT chest non-cont to compare RLL malignancy on CT chest 06/2022  -Incentive spirometry and Supple O2 prn   -s/p Lasix 20mg IVP xs1 in the ED   -Strict   -Pulm Dr. Hancock  -Cardio Dr. Marshall   [facility]: DNR/ DNI, updated molst in the chart likely due to pleural effusion  -CXR: b/l pleural effusion R>L, concern for parapneumonic effusion 2/2 malignancy shown in prior CT chest in 06/2022.   -O2 supplement as needed to maintain O2 sat>95%  -f/u repeat CT chest non-cont to compare RLL malignancy on CT chest 06/2022  -Incentive spirometry and Supple O2 prn   -s/p Lasix 20mg IVP xs1 in the ED   -Strict I&O  -Pulm Dr. Hancock  -Cardio Dr. Marshall   [facility]: DNR/ DNI, updated molst in the chart likely due to pleural effusion  -CXR: b/l pleural effusion R>L, concern for parapneumonic effusion 2/2 malignancy shown in prior CT chest in 06/2022.   -O2 supplement as needed to maintain O2 sat>95%  -f/u repeat CT chest non-cont to compare RLL malignancy on CT chest 06/2022  -Incentive spirometry and Supple O2 prn   -s/p Lasix 20mg IVP xs1 in the ED   -Strict I&O  pulm f/u   [facility]: DNR/ DNI, updated molst in the chart

## 2023-01-04 NOTE — PROGRESS NOTE ADULT - ASSESSMENT
89M coming form home, ambulates with RW, and limited HHA w/ PMHx BPH, HLD, GERD, CAD s/p CABG, CVA, chronic Afib (on ELIquis), and HTN brought in by HHA for AMS and SOB with fall yesterday. Admitted to [facility] for AHRF likely 2/2  HTN emergency 219/103 in the setting of medication noncompliance. CXR shows b/l pleural effusion R>L, concern for parapneumonic effusion 2/2 malignancy shown in prior CT chest in June 2022.

## 2023-01-04 NOTE — PHARMACOTHERAPY INTERVENTION NOTE - COMMENTS
Patient identified by [facility] for Pneumonia. Medication list was reviewed and no additional interventions at this time.

## 2023-01-04 NOTE — PATIENT PROFILE ADULT - FUNCTIONAL ASSESSMENT - BASIC MOBILITY 6.
2-calculated by average. Uses elevator/Not able to assess (calculate score using [facility] averaging method)

## 2023-01-04 NOTE — PROGRESS NOTE ADULT - ASSESSMENT
89M coming form home, ambulates with RW, and limited HHA w/ PMHx BPH, HLD, GERD, CAD s/p CABG, CVA, chronic Afib (on ELIquis), and HTN brought in by HHA for AMS and SOB with fall yesterday. Admitted to [facility] for AHRF likely 2/2  HTN emergency 219/103 in the setting of medication noncompliance. CXR shows b/l pleural effusion R>L, concern for parapneumonic effusion 2/2 malignancy shown in prior CT chest in June 2022.   GOC: DNR/DNI.       89M coming form home, ambulates with RW, and limited HHA w/ PMHx BPH, HLD, GERD, CAD s/p CABG, CVA, chronic Afib (on ELIquis), and HTN brought in by HHA for AMS and SOB with fall yesterday. Admitted to [facility] for AHRF likely 2/2  HTN emergency 219/103 in the setting of medication noncompliance. CXR shows b/l pleural effusion R>L, concern for parapneumonic effusion 2/2 malignancy shown in prior CT chest in June 2022.   GOC: DNR/DNI.  Pt. is awake and alert, confused and severely [place].  O2 N/C 3L in use, O2 sats 98-99%.  Spoke to pt.'s son Williams and EBNIGNO Foley x 2, updated them re pt.'s condition and plan of care.  Family communicated they would like to take pt. home as soon as he is safe to discharge, do not want invasive procedures or even PT.  Explained to family that we will monitor pt. and determine if he is going to need home O2 or not.  Family in agreement with plan.

## 2023-01-05 ENCOUNTER — TRANSCRIPTION ENCOUNTER (OUTPATIENT)
Age: 89
End: 2023-01-05

## 2023-01-05 LAB
ANION GAP SERPL CALC-SCNC: 11 MMOL/L — SIGNIFICANT CHANGE UP (ref 5–17)
BUN SERPL-MCNC: 22 MG/DL — HIGH (ref 7–18)
CALCIUM SERPL-MCNC: 9.7 MG/DL — SIGNIFICANT CHANGE UP (ref 8.4–10.5)
CHLORIDE SERPL-SCNC: 106 MMOL/L — SIGNIFICANT CHANGE UP (ref 96–108)
CO2 SERPL-SCNC: 23 MMOL/L — SIGNIFICANT CHANGE UP (ref 22–31)
CREAT SERPL-MCNC: 1.36 MG/DL — HIGH (ref 0.5–1.3)
EGFR: 46 ML/MIN/1.73M2 — LOW
GLUCOSE BLDC GLUCOMTR-MCNC: 125 MG/DL — HIGH (ref 70–99)
GLUCOSE SERPL-MCNC: 113 MG/DL — HIGH (ref 70–99)
HCT VFR BLD CALC: 31.2 % — LOW (ref 39–50)
HGB BLD-MCNC: 10.3 G/DL — LOW (ref 13–17)
MAGNESIUM SERPL-MCNC: 1.6 MG/DL — SIGNIFICANT CHANGE UP (ref 1.6–2.6)
MCHC RBC-ENTMCNC: 33 GM/DL — SIGNIFICANT CHANGE UP (ref 32–36)
MCHC RBC-ENTMCNC: 33.1 PG — SIGNIFICANT CHANGE UP (ref 27–34)
MCV RBC AUTO: 100.3 FL — HIGH (ref 80–100)
NRBC # BLD: 0 /100 WBCS — SIGNIFICANT CHANGE UP (ref 0–0)
PHOSPHATE SERPL-MCNC: 3.5 MG/DL — SIGNIFICANT CHANGE UP (ref 2.5–4.5)
PLATELET # BLD AUTO: 193 K/UL — SIGNIFICANT CHANGE UP (ref 150–400)
POTASSIUM SERPL-MCNC: 3.7 MMOL/L — SIGNIFICANT CHANGE UP (ref 3.5–5.3)
POTASSIUM SERPL-SCNC: 3.7 MMOL/L — SIGNIFICANT CHANGE UP (ref 3.5–5.3)
RBC # BLD: 3.11 M/UL — LOW (ref 4.2–5.8)
RBC # FLD: 13.2 % — SIGNIFICANT CHANGE UP (ref 10.3–14.5)
SODIUM SERPL-SCNC: 140 MMOL/L — SIGNIFICANT CHANGE UP (ref 135–145)
WBC # BLD: 11.55 K/UL — HIGH (ref 3.8–10.5)
WBC # FLD AUTO: 11.55 K/UL — HIGH (ref 3.8–10.5)

## 2023-01-05 PROCEDURE — 93010 ELECTROCARDIOGRAM REPORT: CPT

## 2023-01-05 RX ORDER — FOLIC ACID 0.8 MG
1 TABLET ORAL
Qty: 30 | Refills: 0
Start: 2023-01-05 | End: 2023-02-03

## 2023-01-05 RX ORDER — APIXABAN 2.5 MG/1
1 TABLET, FILM COATED ORAL
Qty: 0 | Refills: 0 | DISCHARGE

## 2023-01-05 RX ORDER — PREGABALIN 225 MG/1
1 CAPSULE ORAL
Qty: 30 | Refills: 0
Start: 2023-01-05 | End: 2023-02-03

## 2023-01-05 RX ORDER — AMLODIPINE BESYLATE 2.5 MG/1
5 TABLET ORAL DAILY
Refills: 0 | Status: DISCONTINUED | OUTPATIENT
Start: 2023-01-06 | End: 2023-01-06

## 2023-01-05 RX ORDER — FUROSEMIDE 40 MG
1 TABLET ORAL
Qty: 7 | Refills: 0
Start: 2023-01-05 | End: 2023-01-11

## 2023-01-05 RX ORDER — OLANZAPINE 15 MG/1
5 TABLET, FILM COATED ORAL ONCE
Refills: 0 | Status: COMPLETED | OUTPATIENT
Start: 2023-01-05 | End: 2023-01-05

## 2023-01-05 RX ORDER — OLANZAPINE 15 MG/1
2.5 TABLET, FILM COATED ORAL ONCE
Refills: 0 | Status: COMPLETED | OUTPATIENT
Start: 2023-01-05 | End: 2023-01-05

## 2023-01-05 RX ORDER — APIXABAN 2.5 MG/1
1 TABLET, FILM COATED ORAL
Qty: 180 | Refills: 0
Start: 2023-01-05 | End: 2023-04-04

## 2023-01-05 RX ORDER — AMLODIPINE BESYLATE 2.5 MG/1
2.5 TABLET ORAL ONCE
Refills: 0 | Status: COMPLETED | OUTPATIENT
Start: 2023-01-05 | End: 2023-01-05

## 2023-01-05 RX ADMIN — OLANZAPINE 2.5 MILLIGRAM(S): 15 TABLET, FILM COATED ORAL at 04:21

## 2023-01-05 RX ADMIN — AMLODIPINE BESYLATE 2.5 MILLIGRAM(S): 2.5 TABLET ORAL at 19:19

## 2023-01-05 RX ADMIN — APIXABAN 5 MILLIGRAM(S): 2.5 TABLET, FILM COATED ORAL at 05:59

## 2023-01-05 RX ADMIN — Medication 10 MILLIGRAM(S): at 12:19

## 2023-01-05 RX ADMIN — OLANZAPINE 5 MILLIGRAM(S): 15 TABLET, FILM COATED ORAL at 23:13

## 2023-01-05 RX ADMIN — AMLODIPINE BESYLATE 2.5 MILLIGRAM(S): 2.5 TABLET ORAL at 06:04

## 2023-01-05 RX ADMIN — TAMSULOSIN HYDROCHLORIDE 0.4 MILLIGRAM(S): 0.4 CAPSULE ORAL at 21:34

## 2023-01-05 RX ADMIN — APIXABAN 5 MILLIGRAM(S): 2.5 TABLET, FILM COATED ORAL at 17:53

## 2023-01-05 RX ADMIN — Medication 25 MILLIGRAM(S): at 06:02

## 2023-01-05 RX ADMIN — Medication 650 MILLIGRAM(S): at 18:51

## 2023-01-05 RX ADMIN — Medication 3 MILLIGRAM(S): at 22:16

## 2023-01-05 NOTE — PROGRESS NOTE ADULT - PROBLEM SELECTOR PLAN 5
h/o CVA in the past with residual deficients, ambulates with RW and significant hearing impairment in the left ear  likely in the setting of chronic afib, currently taking Eliquis 5 BID  and Toprol 25mg    CTH shows chronic microvascular changes and right cerebellar infarcts, no change from previous imaging   c/w home meds

## 2023-01-05 NOTE — PROGRESS NOTE ADULT - PROBLEM SELECTOR PLAN 3
s/p mechanical fall after trying to help wife up who fell before   CTH shows chronic microvascular changes and right cerebellar infarcts, no change from previous imaging   CM consulted for increase HHA
s/p mechanical fall after trying to help wife up who fell before   CTH shows chronic microvascular changes and right cerebellar infarcts, no change from previous imaging   Fall Precaution   f/u TSH , lipid profile , hgb a1c  PT consulted  CM consulted for increase HHA
s/p mechanical fall after trying to help wife up who fell before   CTH shows chronic microvascular changes and right cerebellar infarcts, no change from previous imaging   Fall Precaution   f/u TSH , lipid profile , hgb a1c  PT consulted  CM consulted for increase HHA

## 2023-01-05 NOTE — PROGRESS NOTE ADULT - PROBLEM SELECTOR PLAN 2
RESOLVED  p/w hypertensive emergency 219/103, limited ROS d/t hearing impairment, reports SOB    h/o HTN takes isosorbide dinitrate 30mg, Toprol ER 25mg and Amlodipine 2.5mg, unclear medication compliance   EKG: Afib, rate controlled, but prolonged Qc 533 - AVOID meds that can prolong qtc   BNP 4274, Trop negative x1   CXR b/l pleural effusion R>L  Hypoxia requiring supp O2, currently 2L NC  Cr mildly elevated 1.4 but not NARINDER (baseline 1.2)  c/w home medications of amlodipine and Toprol with holding parameters   admit to Tele   Will continue to monitor blood pressure  F/u Echo  Cardiologist consulted Rolando Hines

## 2023-01-05 NOTE — DISCHARGE NOTE PROVIDER - NSDCMRMEDTOKEN_GEN_ALL_CORE_FT
amLODIPine 2.5 mg oral tablet: 1 tab(s) orally once a day  atorvastatin 40 mg oral tablet: 1 tab(s) orally once a day  Eliquis 2.5 mg oral tablet: 1 tab(s) orally 2 times a day   Flomax 0.4 mg oral capsule: 1 cap(s) orally once a day  isosorbide dinitrate 20 mg oral tablet: 1 tab(s) orally 2 times   PARoxetine 10 mg oral tablet: 1 tab(s) orally once a day  Toprol-XL 25 mg oral tablet, extended release: 1 tab(s) orally once a day   amLODIPine 2.5 mg oral tablet: 1 tab(s) orally once a day  atorvastatin 40 mg oral tablet: 1 tab(s) orally once a day  Eliquis 2.5 mg oral tablet: 1 tab(s) orally 2 times a day   Flomax 0.4 mg oral capsule: 1 cap(s) orally once a day  isosorbide dinitrate 20 mg oral tablet: 1 tab(s) orally 2 times   Lasix 20 mg oral tablet: 1 tab(s) orally once a day   PARoxetine 10 mg oral tablet: 1 tab(s) orally once a day  Toprol-XL 25 mg oral tablet, extended release: 1 tab(s) orally once a day   amLODIPine 2.5 mg oral tablet: 1 tab(s) orally once a day  atorvastatin 40 mg oral tablet: 1 tab(s) orally once a day  B-12 1000 mcg oral tablet: 1 tab(s) orally once a day   Eliquis 2.5 mg oral tablet: 1 tab(s) orally 2 times a day   Flomax 0.4 mg oral capsule: 1 cap(s) orally once a day  folic acid 1 mg oral tablet: 1 tab(s) orally once a day   isosorbide dinitrate 20 mg oral tablet: 1 tab(s) orally 2 times   Lasix 20 mg oral tablet: 1 tab(s) orally once a day   PARoxetine 10 mg oral tablet: 1 tab(s) orally once a day  Toprol-XL 25 mg oral tablet, extended release: 1 tab(s) orally once a day   amLODIPine 5 mg oral tablet: 1 tab(s) orally once a day  atorvastatin 40 mg oral tablet: 1 tab(s) orally once a day  B-12 1000 mcg oral tablet: 1 tab(s) orally once a day   Eliquis 2.5 mg oral tablet: 1 tab(s) orally 2 times a day   Flomax 0.4 mg oral capsule: 1 cap(s) orally once a day  folic acid 1 mg oral tablet: 1 tab(s) orally once a day   isosorbide dinitrate 20 mg oral tablet: 1 tab(s) orally 2 times   Lasix 20 mg oral tablet: 1 tab(s) orally once a day   PARoxetine 10 mg oral tablet: 1 tab(s) orally once a day  Toprol-XL 25 mg oral tablet, extended release: 1 tab(s) orally once a day

## 2023-01-05 NOTE — PROGRESS NOTE ADULT - PROBLEM SELECTOR PLAN 6
h/o BPH take flomax  c/w home meds

## 2023-01-05 NOTE — PROGRESS NOTE ADULT - ASSESSMENT
89M coming form home, ambulates with RW, and limited HHA w/ PMHx BPH, HLD, GERD, CAD s/p CABG, CVA, chronic Afib (on ELIquis), and HTN brought in by HHA for AMS and SOB with fall yesterday. Admitted to [facility] for AHRF likely 2/2  HTN emergency 219/103 in the setting of medication noncompliance. CXR shows b/l pleural effusion R>L, concern for parapneumonic effusion 2/2 malignancy shown in prior CT chest in June 2022.   GOC: DNR/DNI.  Pt. is awake and alert, confused and severely [place].  O2 N/C 3L in use, O2 sats 98-99%.  Spoke to pt.'s son Williams and BENIGNO Foley x 2, updated them re pt.'s condition and plan of care.  Family communicated they would like to take pt. home as soon as he is safe to discharge, do not want invasive procedures or even PT.  Explained to family that we will monitor pt. and determine if he is going to need home O2 or not.  Family in agreement with plan.

## 2023-01-05 NOTE — PROGRESS NOTE ADULT - ASSESSMENT
Mechanical Fall  CABG with MR with AF with CHF  [place]  Anemia  Early dementia  HLD, GERD, BPH        PLAN- observation  Echo  LASIX, Lopressor xarelto  F/U CXR  in 3 days  P/T  Pt DNR and DNI  Family aware

## 2023-01-05 NOTE — CHART NOTE - NSCHARTNOTEFT_GEN_A_CORE
Discharge was cancelled due to uncontrolled HTN upon EMS arrival. Amlodipine increased to 5mg daily, will plan for dc tomorrow

## 2023-01-05 NOTE — DISCHARGE NOTE PROVIDER - HOSPITAL COURSE
· Assessment    89M coming form home, ambulates with RW, and limited HHA w/ PMHx BPH, HLD, GERD, CAD s/p CABG, CVA, chronic Afib (on ELIquis), and HTN brought in by HHA for AMS and SOB with fall yesterday. Admitted to [facility] for AHRF and  HTN emergency 219/103 in the setting of medication noncompliance. CXR shows b/l pleural effusion R>L, concern for parapneumonic effusion 2/2 malignancy shown in prior CT chest in June 2022. CTH shows chronic microvascular changes and right cerebellar infarcts, no change from previous imaging     Pt weaned to room air with saturation of 94-95%  Echo with normal EF, severe pulmonary htn, LV remodeling  HTN now improved - stable for discharge  Repeat EKG with prolonged Qtc now improved (around low 500  PT recs JOE however family prefers to take pt home with HHA    GOC: DNR/DNI.

## 2023-01-05 NOTE — PROGRESS NOTE ADULT - PROBLEM SELECTOR PLAN 7
Eliquis 5mg BID, consider reducing dose upon d/c 2.5 for age

## 2023-01-05 NOTE — DISCHARGE NOTE PROVIDER - PROVIDER TOKENS
PROVIDER:[TOKEN:[[id number]],FOLLOWUP:[Routine]],PROVIDER:[TOKEN:[[id number]],FOLLOWUP:[Routine]],FREE:[LAST:[inocencia],FIRST:[Yaritza],PHONE:[phone]],FAX:[(   )    -],ADDRESS:[[facility]]]

## 2023-01-05 NOTE — DISCHARGE NOTE PROVIDER - NSDCCPCAREPLAN_GEN_ALL_CORE_FT
PRINCIPAL DISCHARGE DIAGNOSIS  Diagnosis: Acute respiratory failure with hypoxia  Assessment and Plan of Treatment: You came in for worsening shortness of breath, your chest xray showed fluid in both of your lungs  You had an echo showed normal ejection fraction, with severe pulmonary hypertension  You're now on room air and do not need oxygen at this time      SECONDARY DISCHARGE DIAGNOSES  Diagnosis: Bilateral pleural effusion  Assessment and Plan of Treatment: Your chest xray shows new small pleural effusion in both of your lungs  Continue taking lasix 20mg daily, repeat chest xray in two weeks, follow up with your house calls [facility] -- Dr. Stuart    Diagnosis: Pulmonary hypertension  Assessment and Plan of Treatment: on your echocardiogram, it showed that you have pulmonary hypertension  if you prefer, follow up with pulmonologist Dr Hancock  see above    Diagnosis: Hypertensive emergency  Assessment and Plan of Treatment: You can continue taking amlodipine 2.5mg daily and metoprolol succinate 25mg daily  If you prefer, follow up with Dr Marshall    Diagnosis: Chronic atrial fibrillation  Assessment and Plan of Treatment: Due to your high risk of bleeding, your eliquis was changed to 2.5mg twice a day, prescription sent to your pharmacy    Diagnosis: CVA (cerebrovascular accident)  Assessment and Plan of Treatment: You have history of stroke, continue taking your blood pressure medication    Diagnosis: BPH (benign prostatic hyperplasia)  Assessment and Plan of Treatment: Continue flomax 0.4mg at bedtime     PRINCIPAL DISCHARGE DIAGNOSIS  Diagnosis: Acute respiratory failure with hypoxia  Assessment and Plan of Treatment: You came in for worsening shortness of breath, your chest xray showed fluid in both of your lungs  You had an echo showed normal ejection fraction, with severe pulmonary hypertension  You're now on room air and do not need oxygen at this time      SECONDARY DISCHARGE DIAGNOSES  Diagnosis: Bilateral pleural effusion  Assessment and Plan of Treatment: Your chest xray shows new small pleural effusion in both of your lungs  Continue taking lasix 20mg daily for 7 days, repeat chest xray in two weeks, follow up with your house calls [facility] -- Dr. Stuart and in two weeks follow up with Dr Hancock pulmonologist    Diagnosis: Pulmonary hypertension  Assessment and Plan of Treatment: on your echocardiogram, it showed that you have pulmonary hypertension  if you prefer, follow up with pulmonologist Dr Hancock  see above    Diagnosis: Hypertensive emergency  Assessment and Plan of Treatment: You can continue taking imdur 20 mg twice a day, amlodipine 2.5mg daily and metoprolol succinate 25mg daily  If you prefer, follow up with Dr Marshall    Diagnosis: Chronic atrial fibrillation  Assessment and Plan of Treatment: Due to your high risk of bleeding, your eliquis was changed to 2.5mg twice a day, prescription sent to your pharmacy    Diagnosis: CVA (cerebrovascular accident)  Assessment and Plan of Treatment: You have history of stroke, continue taking your blood pressure medication    Diagnosis: BPH (benign prostatic hyperplasia)  Assessment and Plan of Treatment: Continue flomax 0.4mg at bedtime     PRINCIPAL DISCHARGE DIAGNOSIS  Diagnosis: Acute respiratory failure with hypoxia  Assessment and Plan of Treatment: You came in for worsening shortness of breath, your chest xray showed fluid in both of your lungs  You had an echo showed normal ejection fraction, with severe pulmonary hypertension  You're now on room air and do not need oxygen at this time      SECONDARY DISCHARGE DIAGNOSES  Diagnosis: Bilateral pleural effusion  Assessment and Plan of Treatment: Your chest xray shows new small pleural effusion in both of your lungs  Continue taking lasix 20mg daily for 7 days, repeat chest xray in two weeks, follow up with your house calls [facility] -- Dr. Stuart and in two weeks follow up with Dr Hancock pulmonologist    Diagnosis: Pulmonary hypertension  Assessment and Plan of Treatment: on your echocardiogram, it showed that you have pulmonary hypertension  if you prefer, follow up with pulmonologist Dr Hancock  see above    Diagnosis: Hypertensive emergency  Assessment and Plan of Treatment: You can continue taking imdur 20 mg twice a day, amlodipine 2.5mg daily and metoprolol succinate 25mg daily  If you prefer, follow up with Dr Marshall    Diagnosis: Chronic atrial fibrillation  Assessment and Plan of Treatment: Due to your high risk of bleeding, your eliquis was changed to 2.5mg twice a day, prescription sent to your pharmacy    Diagnosis: CVA (cerebrovascular accident)  Assessment and Plan of Treatment: You have history of stroke, continue taking your blood pressure medication    Diagnosis: Macrocytic anemia  Assessment and Plan of Treatment: Your hemoglobin on discharge was 10.4, you can follow up with Dr Hancock for folate serum and vit b12 serum  Continue taking folic acid 1mg daily and vit b12 1000mcg daily    Diagnosis: BPH (benign prostatic hyperplasia)  Assessment and Plan of Treatment: Continue flomax 0.4mg at bedtime     PRINCIPAL DISCHARGE DIAGNOSIS  Diagnosis: Acute respiratory failure with hypoxia  Assessment and Plan of Treatment: You came in for worsening shortness of breath, your chest xray showed fluid in both of your lungs  You had an echo showed normal ejection fraction, with severe pulmonary hypertension  You're now on room air and do not need oxygen at this time      SECONDARY DISCHARGE DIAGNOSES  Diagnosis: Bilateral pleural effusion  Assessment and Plan of Treatment: Your chest xray shows new small pleural effusion in both of your lungs  Continue taking lasix 20mg daily for 7 days, repeat chest xray in two weeks, follow up with your house calls [facility] -- Dr. Stuart and in two weeks follow up with Dr Hancock pulmonologist    Diagnosis: Hypertensive emergency  Assessment and Plan of Treatment: You can continue taking imdur 20 mg twice a day, your amlodipine was increased to 5 mg daily and continue taking metoprolol succinate 25mg daily  If you prefer, follow up with Dr Marshall    Diagnosis: BPH (benign prostatic hyperplasia)  Assessment and Plan of Treatment: Continue flomax 0.4mg at bedtime    Diagnosis: CVA (cerebrovascular accident)  Assessment and Plan of Treatment: You have history of stroke, continue taking your blood pressure medication    Diagnosis: Chronic atrial fibrillation  Assessment and Plan of Treatment: Due to your high risk of bleeding, your eliquis was changed to 2.5mg twice a day, prescription sent to your pharmacy    Diagnosis: Pulmonary hypertension  Assessment and Plan of Treatment: on your echocardiogram, it showed that you have pulmonary hypertension  if you prefer, follow up with pulmonologist Dr Hancock  see above    Diagnosis: Macrocytic anemia  Assessment and Plan of Treatment: Your hemoglobin on discharge was 10.4, you can follow up with Dr Hancock for folate serum and vit b12 serum  Continue taking folic acid 1mg daily and vit b12 1000mcg daily

## 2023-01-05 NOTE — PROGRESS NOTE ADULT - PROBLEM SELECTOR PLAN 1
likely due to pleural effusion  -CXR: b/l pleural effusion R>L, concern for parapneumonic effusion 2/2 malignancy shown in prior CT chest in 06/2022.   -O2 supplement as needed to maintain O2 sat>95%  -f/u repeat CT chest non-cont to compare RLL malignancy on CT chest 06/2022  -Incentive spirometry and Supple O2 prn   -s/p Lasix 20mg IVP xs1 in the ED   -Strict I&O  pulm f/u   GOC: DNR/ DNI, updated molst in the chart

## 2023-01-05 NOTE — DISCHARGE NOTE PROVIDER - CARE PROVIDER_API CALL
Adrian Marshall)  Cardiology  [address] [place] [address]  Phone: [phone]  Fax: [phone]  Follow Up Time: Routine    Ankit Hancock)  Internal Medicine; Pulmonary Disease  [address] [place] [address]  Phone: [phone]  Fax: [phone]  Follow Up Time: Routine    Yaritza pro [facility] calls  Phone: [phone]  Fax: (   )    -  Follow Up Time:

## 2023-01-05 NOTE — PROGRESS NOTE ADULT - PROBLEM SELECTOR PLAN 4
h/o chronic afib on Eliquis 5mg BID and toprol 25mg  EKG: Afib, rate controlled, but prolonged Qc 533 - AVOID meds that can prolong qtc   c/w home meds   HASBLED: 5 points, high risk of bleeding - consider reducing Eliquis to 2.5 upon d/c   admit to tele
h/o chronic afib on Eliquis 5mg BID and toprol 25mg  EKG: Afib, rate controlled, but prolonged Qc 533 - AVOID meds that can prolong qtc   c/w home meds   HASBLED: 5 points, high risk of bleeding - consider reducing Eliquis to 2.5 upon d/c   admit to tele
h/o chronic afib   cont  Eliquis 5mg BID and toprol 25mg

## 2023-01-05 NOTE — DISCHARGE NOTE NURSING/CASE MANAGEMENT/SOCIAL WORK - PATIENT PORTAL LINK FT
You can access the FollowMyHealth Patient Portal offered by [facility] by registering at the following website: [email or web address]. By joining [employer]’s FollowMyHealth portal, you will also be able to view your health information using other applications (apps) compatible with our system.

## 2023-01-06 VITALS
OXYGEN SATURATION: 96 % | TEMPERATURE: 98 F | RESPIRATION RATE: 18 BRPM | HEART RATE: 76 BPM | SYSTOLIC BLOOD PRESSURE: 162 MMHG | DIASTOLIC BLOOD PRESSURE: 71 MMHG

## 2023-01-06 PROCEDURE — 93306 TTE W/DOPPLER COMPLETE: CPT

## 2023-01-06 PROCEDURE — 85025 COMPLETE CBC W/AUTO DIFF WBC: CPT

## 2023-01-06 PROCEDURE — 84484 ASSAY OF TROPONIN QUANT: CPT

## 2023-01-06 PROCEDURE — 93005 ELECTROCARDIOGRAM TRACING: CPT

## 2023-01-06 PROCEDURE — 87637 SARSCOV2&INF A&B&RSV AMP PRB: CPT

## 2023-01-06 PROCEDURE — 87040 BLOOD CULTURE FOR BACTERIA: CPT

## 2023-01-06 PROCEDURE — 82962 GLUCOSE BLOOD TEST: CPT

## 2023-01-06 PROCEDURE — 97162 PT EVAL MOD COMPLEX 30 MIN: CPT

## 2023-01-06 PROCEDURE — 83735 ASSAY OF MAGNESIUM: CPT

## 2023-01-06 PROCEDURE — 84100 ASSAY OF PHOSPHORUS: CPT

## 2023-01-06 PROCEDURE — 87086 URINE CULTURE/COLONY COUNT: CPT

## 2023-01-06 PROCEDURE — 80053 COMPREHEN METABOLIC PANEL: CPT

## 2023-01-06 PROCEDURE — 36415 COLL VENOUS BLD VENIPUNCTURE: CPT

## 2023-01-06 PROCEDURE — 71045 X-RAY EXAM CHEST 1 VIEW: CPT

## 2023-01-06 PROCEDURE — 83605 ASSAY OF LACTIC ACID: CPT

## 2023-01-06 PROCEDURE — 85730 THROMBOPLASTIN TIME PARTIAL: CPT

## 2023-01-06 PROCEDURE — 81001 URINALYSIS AUTO W/SCOPE: CPT

## 2023-01-06 PROCEDURE — 83880 ASSAY OF NATRIURETIC PEPTIDE: CPT

## 2023-01-06 PROCEDURE — 85027 COMPLETE CBC AUTOMATED: CPT

## 2023-01-06 PROCEDURE — 85610 PROTHROMBIN TIME: CPT

## 2023-01-06 PROCEDURE — 99285 EMERGENCY DEPT VISIT HI MDM: CPT

## 2023-01-06 PROCEDURE — 80048 BASIC METABOLIC PNL TOTAL CA: CPT

## 2023-01-06 PROCEDURE — 70450 CT HEAD/BRAIN W/O DYE: CPT | Mod: MA

## 2023-01-06 RX ORDER — AMLODIPINE BESYLATE 2.5 MG/1
1 TABLET ORAL
Qty: 0 | Refills: 0 | DISCHARGE

## 2023-01-06 RX ORDER — AMLODIPINE BESYLATE 2.5 MG/1
1 TABLET ORAL
Qty: 30 | Refills: 0
Start: 2023-01-06 | End: 2023-02-04

## 2023-01-06 RX ADMIN — APIXABAN 5 MILLIGRAM(S): 2.5 TABLET, FILM COATED ORAL at 06:06

## 2023-01-06 RX ADMIN — Medication 10 MILLIGRAM(S): at 11:19

## 2023-01-06 RX ADMIN — Medication 25 MILLIGRAM(S): at 06:07

## 2023-01-06 NOTE — PROGRESS NOTE ADULT - TIME BILLING
I have examined pt personally Hx chart lab and xrays reviewed and pt discussed with staff and PMD
I have examined pt personally Hx chart lab and xrays reviewed and pt discussed with staff and PMD
- Review of records, telemetry, vital signs and daily labs.   - General and cardiovascular physical examination.  - Generation of cardiovascular treatment plan.  - Coordination of care.      Patient was seen and examined by me on 1/4/23,interim events noted,labs and radiology studies reviewed.  Adrian Marshall MD,FACC.  [address] [place], [place] [id number].  [phone]
- Review of records, telemetry, vital signs and daily labs.   - General and cardiovascular physical examination.  - Generation of cardiovascular treatment plan.  - Coordination of care.      Patient was seen and examined by me on 1/4/23,interim events noted,labs and radiology studies reviewed.  Adrian Marshall MD,FACC.  [address] [place], [place] [id number].  [phone]

## 2023-01-06 NOTE — PROGRESS NOTE ADULT - SUBJECTIVE AND OBJECTIVE BOX
PULMONARY  progress note    ROSARIO RUIZ  [id number]    Patient is a 89y old  Male who presents with a chief complaint of AMS, AHRF (2023 14:53)  No cough, sob or PND      MEDICATIONS  (STANDING):  amLODIPine   Tablet 2.5 milliGRAM(s) Oral daily  apixaban 5 milliGRAM(s) Oral every 12 hours  coronavirus bivalent (EUA) Booster Vaccine (PFIZER) 0.3 milliLiter(s) IntraMuscular once  metoprolol succinate ER 25 milliGRAM(s) Oral daily  PARoxetine 10 milliGRAM(s) Oral daily  tamsulosin 0.4 milliGRAM(s) Oral at bedtime      MEDICATIONS  (PRN):  acetaminophen     Tablet .. 650 milliGRAM(s) Oral every 6 hours PRN Temp greater or equal to 38C (100.4F), Mild Pain (1 - 3)  melatonin 3 milliGRAM(s) Oral at bedtime PRN Insomnia      Allergies    No Known Allergies            PAST MEDICAL & SURGICAL HISTORY:  Accelerated Hypertension      Coronary artery disease      H/O: hypertension      History of atrial fibrillation      S/P CABG (coronary artery bypass graft)      History of coronary artery bypass graft               REVIEW OF SYSTEMS:  CONSTITUTIONAL: No fever, weight loss, or fatigue   EYES: No eye pain, visual disturbances, or discharge  ENT:   [place], tinnitus, vertigo; No sinus or throat pain  NECK: No pain or stiffness or nodes  RESPIRATORY:  cough--   wheezing--   chills--   hemoptysis--  Shortness of Breath--  CARDIOVASCULAR: No chest pain, palpitations, passing out, dizziness, or leg swelling  GASTROINTESTINAL: No abdominal or epigastric pain. No nausea, vomiting, o  SKIN: No itching, burning, rashes, or lesions   LYMPH Nodes: No enlarged glands  ENDOCRINE: No heat or cold intolerance; No hair loss  MUSCULOSKELETAL: No joint pain or swelling; No muscle, back, or extremity pain  HEME/LYMPH: No easy bruising, or bleeding gums  ALLERGY AND IMMUNOLOGIC: No hives or eczema        Vital Signs Last 24 Hrs  T(C): 36.4 (2023 07:19), Max: 36.8 (2023 04:20)  T(F): 97.6 (2023 07:19), Max: 98.2 (2023 04:20)  HR: 115 (2023 07:19) (68 - 126)  BP: 158/77 (2023 07:19) (138/63 - 189/98)  BP(mean): 111 (2023 19:45) (111 - 129)  RR: 19 (2023 07:19) (18 - 19)  SpO2: 94% (2023 07:19) (93% - 100%)    Parameters below as of 2023 07:19  Patient On (Oxygen Delivery Method): nasal cannula  O2 Flow (L/min): 3    I&O's Detail    2023 07:01  -  2023 10:39  --------------------------------------------------------  IN:    Oral Fluid: 295 mL  Total IN: 295 mL    OUT:  Total OUT: 0 mL    Total NET: 295 mL          PHYSICAL EXAMINATION:    GENERAL: The patient is an elderly w/m in no apparent distress.   SKIN no rash ecchymoses or bruises  HEENT: Head is normocephalic and atraumatic  BENJAMIN , Extraocular muscles are intact. Mucous membranes  moist.   Neck supple ,No LN felt JVP not increased  Thyroid not enlarged  Cardiovascular:  S1 S2 heard, AF, No JVD , systolic  murmur at apex, No gallop or rub  Respiratory: Chest wall symmetrical with good air entry ,Percussion note normal,    Lungs vesicular breathing with  no  rales  or  wheeze	  ABDOMEN:  Soft, Non-tender, no hepatomegaly or splenomegaly BS positive	  Extremities: Normal range of motion, No clubbing, cyanosis or edema, Scar rt arm  Vascular: Peripheral pulses palpable 2+ bilaterally  CNS:  Alert and responsive  sensory intact  motor power5/5  dtr 2+  Babinski neg        LABS:                        10.3   11.55 )-----------( 193      ( 2023 07:14 )             31.2     01-05    140  |  106  |  22<H>  ----------------------------<  113<H>  3.7   |  23  |  1.36<H>    Ca    9.7      2023 07:14  Phos  3.5     01-05  Mg     1.6     01-05    TPro  6.5  /  Alb  3.2<L>  /  TBili  0.7  /  DBili  x   /  AST  23  /  ALT  22  /  AlkPhos  98      PT/INR - ( 2023 14:05 )   PT: 17.3 sec;   INR: 1.45 ratio         PTT - ( 2023 14:05 )  PTT:42.4 sec  Urinalysis Basic - ( 2023 16:35 )    Color: Yellow / Appearance: Clear / S.010 / pH: x  Gluc: x / Ketone: Negative  / Bili: Negative / Urobili: Negative   Blood: x / Protein: 15 / Nitrite: Negative   Leuk Esterase: Negative / RBC: 5-10 /HPF / WBC 0-2 /HPF   Sq Epi: x / Non Sq Epi: Occasional /HPF / Bacteria: Trace /HPF            Troponin I, High Sensitivity Result: 37.2 ng/L (23 @ 14:05)      Serum Pro-Brain Natriuretic Peptide: 4274 pg/mL (23 @ 14:05)        MICROBIOLOGY:    Culture - Urine (collected 23 @ 16:35)  Source: Clean Catch Clean Catch (Midstream)  Final Report (23 @ 16:48):    <10,000 CFU/mL Normal Urogenital Ria    Culture - Blood (collected 23 @ 14:05)  Source: .Blood Blood-Peripheral  Preliminary Report (23 @ 20:02):    No growth to date.    Culture - Blood (collected 23 @ 13:55)  Source: .Blood Blood-Peripheral  Preliminary Report (23 @ 20:02):    No growth to date.        CT HEAD; < from: CT Head No Cont (23 @ 14:25) >  Multiple axial sections were performed from base of vertex without   contrast enhancement. Coronal and sagittal reconstructions were performed    This exam is compared with prior head CT performed on 2022.    Left-sided hearing aid is seen with artifact.    Parenchymal volume loss and chronic microvascular ischemic changes are   again seen and unchanged.    Abnormal area of low-attenuation involving the high left posterior   frontal/parietal cortex is again seen which is likely compatible with an   old infarct.    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    Evaluation of the osseous structures with the appropriate window appears   normal    Minimal mucosal thickening is seen involving the posterior left ethmoid   sinus    Both mastoid and middle ear regions appear clear.        
PULMONARY  progress note    ROSARIO RUIZ  [id number]    Patient is a 89y old  Male who presents with a chief complaint of AMS, AHRF (05 Jan 2023 13:22)  no cough or sob, confused      MEDICATIONS  (STANDING):  amLODIPine   Tablet 5 milliGRAM(s) Oral daily  apixaban 5 milliGRAM(s) Oral every 12 hours  coronavirus bivalent (EUA) Booster Vaccine (PFIZER) 0.3 milliLiter(s) IntraMuscular once  metoprolol succinate ER 25 milliGRAM(s) Oral daily  PARoxetine 10 milliGRAM(s) Oral daily  tamsulosin 0.4 milliGRAM(s) Oral at bedtime      MEDICATIONS  (PRN):  acetaminophen     Tablet .. 650 milliGRAM(s) Oral every 6 hours PRN Temp greater or equal to 38C (100.4F), Mild Pain (1 - 3)  melatonin 3 milliGRAM(s) Oral at bedtime PRN Insomnia      Allergies    No Known Allergies    Intolerances        PAST MEDICAL & SURGICAL HISTORY:  Accelerated Hypertension      Coronary artery disease      H/O: hypertension      History of atrial fibrillation      S/P CABG (coronary artery bypass graft)      History of coronary artery bypass graft               REVIEW OF SYSTEMS: as per staff  CONSTITUTIONAL: No fever, weight loss, or fatigue   EYES: No eye pain, visual disturbances, or discharge  ENT:  No difficulty hearing, tinnitus, vertigo; No sinus or throat pain  NECK: No pain or stiffness or nodes  RESPIRATORY:  cough --  wheezing --  chills --  hemoptysis -- Shortness of Breath--  CARDIOVASCULAR: No chest pain, palpitations, passing out, dizziness, or leg swelling  GASTROINTESTINAL: No abdominal or epigastric pain. No nausea, vomiting, or hematemesis;  GENITOURINARY: No dysuria, frequency, hematuria  SKIN: No itching, burning, rashes, or lesions   LYMPH Nodes: No enlarged glands  ENDOCRINE: No heat or cold intolerance; No hair loss  ALLERGY AND IMMUNOLOGIC: No hives or eczema        Vital Signs Last 24 Hrs  T(C): 36.3 (06 Jan 2023 07:20), Max: 36.7 (05 Jan 2023 11:03)  T(F): 97.4 (06 Jan 2023 07:20), Max: 98.1 (05 Jan 2023 11:03)  HR: 80 (06 Jan 2023 07:20) (76 - 90)  BP: 150/63 (06 Jan 2023 07:20) (134/81 - 186/96)  BP(mean): 109 (05 Jan 2023 19:11) (109 - 109)  RR: 19 (06 Jan 2023 07:20) (19 - 19)  SpO2: 96% (06 Jan 2023 07:20) (92% - 96%)    Parameters below as of 06 Jan 2023 07:20  Patient On (Oxygen Delivery Method): room air      I&O's Detail    05 Jan 2023 07:01  -  06 Jan 2023 07:00  --------------------------------------------------------  IN:    Oral Fluid: 531 mL  Total IN: 531 mL    OUT:  Total OUT: 0 mL    Total NET: 531 mL          PHYSICAL EXAMINATION:    GENERAL: The patient is a thin elderly male in no apparent distress.   SKIN no rash ecchymoses or bruises  HEENT: Head is normocephalic and atraumatic , Extraocular muscles are intact. Mucous membranes  moist.   Neck supple ,No LN felt JVP not increased  Thyroid not enlarged  Cardiovascular:  S1 S2 heard, AF,  No JVD , systolic  murmur at apex, No gallop or rub  Respiratory: Chest wall symmetrical with good air entry ,Percussion note normal,    Lungs vesicular breathing with  fine rt basilar  rales , no  wheeze	  ABDOMEN:  Soft, Non-tender, no hepatomegaly or splenomegaly BS positive	  Extremities: Normal range of motion, No clubbing, cyanosis or edema  Vascular: Peripheral pulses palpable 2+ bilaterally  CNS: lethargic , confused  sensory intact  motor power5/5  dtr 2+  Babinski neg        LABS:                        10.3   11.55 )-----------( 193      ( 05 Jan 2023 07:14 )             31.2     01-05    140  |  106  |  22<H>  ----------------------------<  113<H>  3.7   |  23  |  1.36<H>    Ca    9.7      05 Jan 2023 07:14  Phos  3.5     01-05  Mg     1.6     01-05                  Serum Pro-Brain Natriuretic Peptide: 4274 pg/mL (01-03-23 @ 14:05)            MICROBIOLOGY:    Culture - Urine (collected 01-03-23 @ 16:35)  Source: Clean Catch Clean Catch (Midstream)  Final Report (01-04-23 @ 16:48):    <10,000 CFU/mL Normal Urogenital Ria    Culture - Blood (collected 01-03-23 @ 14:05)  Source: .Blood Blood-Peripheral  Preliminary Report (01-04-23 @ 20:02):    No growth to date.    Culture - Blood (collected 01-03-23 @ 13:55)  Source: .Blood Blood-Peripheral  Preliminary Report (01-04-23 @ 20:02):    No growth to date.    echo; < from: Transthoracic Echocardiogram (01.04.23 @ 07:20) >  1. Mitral annular calcification. Mild mitral regurgitation.  2. Calcified aortic valve with normal opening. Mild aortic  regurgitation.  3. Mild left atrial enlargement.  4. Increased relative wall thickness withnormal left  ventricular (LV) mass index, consistent with concentric LV  remodeling.  5. Endocardium not well visualized; grossly normal left  ventricular systolic function.  6. Normal right ventricular size and function.  7. RV systolic pressure is 81 mm Hg. Severe pulmonary  hypertension.  8. There is moderate tricuspid regurgitation.    
NP Note discussed with  Primary Attending    Patient is a 89y old  Male who presents with a chief complaint of AMS, AHRF (2023 11:07)      INTERVAL HPI/OVERNIGHT EVENTS: no new complaints    MEDICATIONS  (STANDING):  amLODIPine   Tablet 2.5 milliGRAM(s) Oral daily  apixaban 5 milliGRAM(s) Oral every 12 hours  metoprolol succinate ER 25 milliGRAM(s) Oral daily  PARoxetine 10 milliGRAM(s) Oral daily  tamsulosin 0.4 milliGRAM(s) Oral at bedtime    MEDICATIONS  (PRN):  acetaminophen     Tablet .. 650 milliGRAM(s) Oral every 6 hours PRN Temp greater or equal to 38C (100.4F), Mild Pain (1 - 3)  melatonin 3 milliGRAM(s) Oral at bedtime PRN Insomnia      __________________________________________________  REVIEW OF SYSTEMS:    CONSTITUTIONAL: No fever,   EYES: no acute visual disturbances  NECK: No pain or stiffness  RESPIRATORY: No cough; No shortness of breath  CARDIOVASCULAR: No chest pain, no palpitations  GASTROINTESTINAL: No pain. No nausea or vomiting; No diarrhea   NEUROLOGICAL: No headache or numbness, no tremors  MUSCULOSKELETAL: No joint pain, no muscle pain  GENITOURINARY: no dysuria, no frequency, no hesitancy  PSYCHIATRY: no depression , no anxiety  ALL OTHER  ROS negative        Vital Signs Last 24 Hrs  T(C): 36.6 (2023 11:18), Max: 36.6 (2023 11:18)  T(F): 97.8 (2023 11:18), Max: 97.8 (2023 11:18)  HR: 68 (2023 11:18) (60 - 76)  BP: 146/66 (2023 11:18) (119/68 - 206/78)  BP(mean): --  RR: 18 (2023 11:18) (18 - 20)  SpO2: 99% (2023 11:18) (92% - 99%)    Parameters below as of 2023 11:18  Patient On (Oxygen Delivery Method): nasal cannula  O2 Flow (L/min): 3      ________________________________________________  PHYSICAL EXAM:  appropriate for age  GENERAL: NAD  HEENT: [place], Normocephalic;  conjunctivae and sclerae clear; moist mucous membranes;   NECK : supple  CHEST/LUNG: Clear to auscultation bilaterally with good air entry   HEART: S1 S2  regular; no murmurs, gallops or rubs  ABDOMEN: Soft, Nontender, Nondistended; Bowel sounds present  EXTREMITIES: no cyanosis; no edema; no calf tenderness  SKIN: warm and dry; no rash  NERVOUS SYSTEM:  Awake and alert; Unable to obtain due to severe impaired hearing    _________________________________________________  LABS:                        9.3    7.43  )-----------( 155      ( 2023 06:58 )             28.1     01-04    140  |  106  |  25<H>  ----------------------------<  112<H>  3.8   |  25  |  1.37<H>    Ca    9.1      2023 06:58  Phos  4.1     -04  Mg     1.4     -04    TPro  6.5  /  Alb  3.2<L>  /  TBili  0.7  /  DBili  x   /  AST  23  /  ALT  22  /  AlkPhos  98  01-04    PT/INR - ( 2023 14:05 )   PT: 17.3 sec;   INR: 1.45 ratio         PTT - ( 2023 14:05 )  PTT:42.4 sec  Urinalysis Basic - ( 2023 16:35 )    Color: Yellow / Appearance: Clear / S.010 / pH: x  Gluc: x / Ketone: Negative  / Bili: Negative / Urobili: Negative   Blood: x / Protein: 15 / Nitrite: Negative   Leuk Esterase: Negative / RBC: 5-10 /HPF / WBC 0-2 /HPF   Sq Epi: x / Non Sq Epi: Occasional /HPF / Bacteria: Trace /HPF      CAPILLARY BLOOD GLUCOSE    RADIOLOGY & ADDITIONAL TESTS:  < from: CT Head No Cont (23 @ 14:25) >    ACC: [id number] EXAM:  CT BRAIN                          PROCEDURE DATE:  2023          INTERPRETATION:  Clinical indication: Fall.    Multiple axial sections were performed from base of vertex without   contrast enhancement. Coronal and sagittal reconstructions were performed    This exam is compared with prior head CT performed on 2022.    Left-sided hearing aid is seen with artifact.    Parenchymal volume loss and chronic microvascular ischemic changes are   again seen and unchanged.    Abnormal area of low-attenuation involving the high left posterior   frontal/parietal cortex is again seen which is likely compatible with an   old infarct.    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    Evaluation of the osseous structures with the appropriate window appears   normal    Minimal mucosal thickening is seen involving the posterior left ethmoid   sinus    Both mastoid and middle ear regions appear clear.    Patient is status post cataract surgery on the right side. Bilateral   scleral banding is seen.    IMPRESSION: Stable exam.    --- End of Report ---    < end of copied text >    < from: Xray Chest 1 View-PORTABLE IMMEDIATE (23 @ 13:58) >    ACC: [id number] EXAM:  XR CHEST PORTABLE IMMED 1V                          PROCEDURE DATE:  2023          INTERPRETATION:  CLINICAL STATEMENT: Chest Pain.    TECHNIQUE: AP view of the chest.    COMPARISON: 3/1/2022    FINDINGS/  IMPRESSION:  Sternotomy wires again noted.    New small to moderate size right pleural effusion with adjacent airspace   opacity. Trace left pleural effusion.    Heart size cannot be accurately assessed in this projection.    < end of copied text >        Imaging Personally Reviewed:  YES/NO    Consultant(s) Notes Reviewed:   YES/ No    Care Discussed with Consultants :     Plan of care was discussed with patient and /or primary care giver; all questions and concerns were addressed and care was aligned with patient's wishes.    
PATIENT SEEN AND EXAMINED ON :- 1/4/23  DATE OF SERVICE:  1/4/23           Interim events noted,Labs ,Radiological studies and Cardiology tests reviewed .       HOSPITAL COURSE: HPI:  89M coming form home, ambulates with RW, and limited HHA w/ PMHx BPH, HLD, GERD, CAD s/p CABG, CVA, chronic Afib (on ELIquis), and HTN brought in by HHA for AMS and SOB with fall yesterday. Per the HHA, patient was trying to help his wife up after she fell and he fell on top of her, injuring his left arm, since then he has been not himself. HHA notes that he walks and talks, responsible for taking his own medications, however unclear if has been taking all of his medications in the evening as patient does not have HHA at night. Upon EMS arrival found to be hypoxic and placed on NRBM with elevated BP. Patient is AOx2-3 with significant hearing impairment with hearing aid in the left ear, but able to make his own decisions. Per the son, Williams Prado (HCP) the visiting NP has noted increasing BP but with no intervention. In the ED, patient decided to continue care with DNR/DNI, family is in agreement. ROS limited d/t hearing impairment but denies HA, chest pain, and abdominal pain.  (03 Jan 2023 19:27)      INTERIM EVENTS:Patient seen at bedside ,interim events noted.      PMH -reviewed admission note, no change since admission  HEART FAILURE: Acute[ ]Chronic[ ] Systolic[ ] Diastolic[ ] Combined Systolic and Diastolic[ ]  CAD[ ] CABG[ ] PCI[ ]  DEVICES[ ] PPM[ ] ICD[ ] ILR[ ]  ATRIAL FIBRILLATION[ ] Paroxysmal[ ] Permanent[ ] CHADS2-[  ]  NARINDER[ ] CKD1[ ] CKD2[ ] CKD3[ ] CKD4[ ] ESRD[ ]  COPD[ ] HTN[ ]   DM[ ] Type1[ ] Type 2[ ]   CVA[ ] Paresis[ ]    AMBULATION: Assisted[ ] Cane/walker[ ] Independent[ ]    MEDICATIONS  (STANDING):  amLODIPine   Tablet 2.5 milliGRAM(s) Oral daily  apixaban 5 milliGRAM(s) Oral every 12 hours  coronavirus bivalent (EUA) Booster Vaccine (PFIZER) 0.3 milliLiter(s) IntraMuscular once  metoprolol succinate ER 25 milliGRAM(s) Oral daily  PARoxetine 10 milliGRAM(s) Oral daily  tamsulosin 0.4 milliGRAM(s) Oral at bedtime    MEDICATIONS  (PRN):  acetaminophen     Tablet .. 650 milliGRAM(s) Oral every 6 hours PRN Temp greater or equal to 38C (100.4F), Mild Pain (1 - 3)  melatonin 3 milliGRAM(s) Oral at bedtime PRN Insomnia            REVIEW OF SYSTEMS:  Constitutional: [ ] fever, [ ]weight loss,  [ ]fatigue [ ]weight gain  Eyes: [ ] visual changes  Respiratory: [ ]shortness of breath;  [ ] cough, [ ]wheezing, [ ]chills, [ ]hemoptysis  Cardiovascular: [ ] chest pain, [ ]palpitations, [ ]dizziness,  [ ]leg swelling[ ]orthopnea[ ]PND  Gastrointestinal: [ ] abdominal pain, [ ]nausea, [ ]vomiting,  [ ]diarrhea [ ]Constipation [ ]Melena  Genitourinary: [ ] dysuria, [ ] hematuria [ ][place]  Neurologic: [ ] headaches [ ] tremors[ ]weakness [ ]Paralysis Right[ ] Left[ ]  Skin: [ ] itching, [ ]burning, [ ] rashes  Endocrine: [ ] heat or cold intolerance  Musculoskeletal: [ ] joint pain or swelling; [ ] muscle, back, or extremity pain  Psychiatric: [ ] depression, [ ]anxiety, [ ]mood swings, or [ ]difficulty sleeping  Hematologic: [ ] easy bruising, [ ] bleeding gums    [ ] All remaining systems negative except as per above.   [ ]Unable to obtain.  [x] No change in ROS since admission      Vital Signs Last 24 Hrs  T(C): 36.7 (04 Jan 2023 19:45), Max: 36.7 (04 Jan 2023 19:45)  T(F): 98.1 (04 Jan 2023 19:45), Max: 98.1 (04 Jan 2023 19:45)  HR: 85 (04 Jan 2023 19:45) (60 - 95)  BP: 172/81 (04 Jan 2023 19:45) (119/68 - 189/98)  BP(mean): 111 (04 Jan 2023 19:45) (111 - 129)  RR: 18 (04 Jan 2023 19:45) (18 - 20)  SpO2: 100% (04 Jan 2023 19:45) (97% - 100%)    Parameters below as of 04 Jan 2023 19:45  Patient On (Oxygen Delivery Method): nasal cannula  O2 Flow (L/min): 3    I&O's Summary      PHYSICAL EXAM:  General: No acute distress BMI-  HEENT: EOMI, PERRL  Neck: Supple, [ ] JVD  Lungs: Equal air entry bilaterally; [ ] rales [ ] wheezing [ ] rhonchi  Heart: Regular rate and rhythm; [x ] murmur   2/6 [ x] systolic [ ] diastolic [ ] radiation[ ] rubs [ ]  gallops  Abdomen: Nontender, bowel sounds present  Extremities: No clubbing, cyanosis, [ ] edema [ ]Pulses  equal and intact  Nervous system:  Alert & Oriented X3, no focal deficits  Psychiatric: Normal affect  Skin: No rashes or lesions    LABS:  01-04    140  |  106  |  25<H>  ----------------------------<  112<H>  3.8   |  25  |  1.37<H>    Ca    9.1      04 Jan 2023 06:58  Phos  4.1     01-04  Mg     1.4     01-04    TPro  6.5  /  Alb  3.2<L>  /  TBili  0.7  /  DBili  x   /  AST  23  /  ALT  22  /  AlkPhos  98  01-04    Creatinine Trend: 1.37<--, 1.24<--                        9.3    7.43  )-----------( 155      ( 04 Jan 2023 06:58 )             28.1     PT/INR - ( 03 Jan 2023 14:05 )   PT: 17.3 sec;   INR: 1.45 ratio         PTT - ( 03 Jan 2023 14:05 )  PTT:42.4 sec    Serum Pro-Brain Natriuretic Peptide: 4274 pg/mL (01-03-23 @ 14:05)        
  Patient is a 89y old  Male who presents with a chief complaint of AMS, AHRF (2023 11:07)      INTERVAL HPI/OVERNIGHT EVENTS: pt seen and examined    MEDICATIONS  (STANDING):  apixaban 5 milliGRAM(s) Oral every 12 hours  coronavirus bivalent (EUA) Booster Vaccine (PFIZER) 0.3 milliLiter(s) IntraMuscular once  metoprolol succinate ER 25 milliGRAM(s) Oral daily  PARoxetine 10 milliGRAM(s) Oral daily  tamsulosin 0.4 milliGRAM(s) Oral at bedtime    MEDICATIONS  (PRN):  acetaminophen     Tablet .. 650 milliGRAM(s) Oral every 6 hours PRN Temp greater or equal to 38C (100.4F), Mild Pain (1 - 3)  melatonin 3 milliGRAM(s) Oral at bedtime PRN Insomnia    __________________________________________________  Vital Signs Last 24 Hrs  T(C): 36.3 (23 @ 19:55), Max: 36.8 (23 @ 04:20)  T(F): 97.3 (23 @ 19:55), Max: 98.2 (23 @ 04:20)  HR: 87 (23 @ 19:55) (76 - 126)  BP: 181/60 (23 @ 19:55) (134/81 - 186/96)  BP(mean): 109 (23 @ 19:11) (109 - 109)  RR: 19 (23 @ 19:55) (18 - 19)  SpO2: 95% (23 @ 19:55) (92% - 96%)        ________________________________________________  PHYSICAL EXAM:  appropriate for age  GENERAL: NAD  HEENT: [place], Normocephalic;  conjunctivae and sclerae clear; moist mucous membranes;   NECK : supple  CHEST/LUNG: dec breath sounds at bases  HEART: S1 S2  regular; no murmurs, gallops or rubs  ABDOMEN: Soft, Nontender, Nondistended; Bowel sounds present  EXTREMITIES: no cyanosis; no edema; no calf tenderness  SKIN: warm and dry; no rash  NERVOUS SYSTEM:  Awake and alert; Unable to obtain due to severe impaired hearing    LABS:      140  |  106  |  22<H>  ----------------------------<  113<H>  3.7   |  23  |  1.36<H>    Ca    9.7      2023 07:14  Phos  3.5       Mg     1.6         TPro  6.5  /  Alb  3.2<L>  /  TBili  0.7  /  DBili      /  AST  23  /  ALT  22  /  AlkPhos  98      Creatinine Trend: 1.36 <--, 1.37 <--, 1.24 <--                        10.3   11.55 )-----------( 193      ( 2023 07:14 )             31.2     Urine Studies:  Urinalysis Basic - ( 2023 16:35 )    Color: Yellow / Appearance: Clear / S.010 / pH:   Gluc:  / Ketone: Negative  / Bili: Negative / Urobili: Negative   Blood:  / Protein: 15 / Nitrite: Negative   Leuk Esterase: Negative / RBC: 5-10 /HPF / WBC 0-2 /HPF   Sq Epi:  / Non Sq Epi: Occasional /HPF / Bacteria: Trace /HPF              LIVER FUNCTIONS - ( 2023 06:58 )  Alb: 3.2 g/dL / Pro: 6.5 g/dL / ALK PHOS: 98 U/L / ALT: 22 U/L DA / AST: 23 U/L / GGT: x               RADIOLOGY & ADDITIONAL TESTS:  < from: CT Head No Cont (23 @ 14:25) >    ACC: [id number] EXAM:  CT BRAIN                          PROCEDURE DATE:  2023          INTERPRETATION:  Clinical indication: Fall.    Multiple axial sections were performed from base of vertex without   contrast enhancement. Coronal and sagittal reconstructions were performed    This exam is compared with prior head CT performed on 2022.    Left-sided hearing aid is seen with artifact.    Parenchymal volume loss and chronic microvascular ischemic changes are   again seen and unchanged.    Abnormal area of low-attenuation involving the high left posterior   frontal/parietal cortex is again seen which is likely compatible with an   old infarct.    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    Evaluation of the osseous structures with the appropriate window appears   normal    Minimal mucosal thickening is seen involving the posterior left ethmoid   sinus    Both mastoid and middle ear regions appear clear.    Patient is status post cataract surgery on the right side. Bilateral   scleral banding is seen.    IMPRESSION: Stable exam.    --- End of Report ---    < end of copied text >    < from: Xray Chest 1 View-PORTABLE IMMEDIATE (23 @ 13:58) >    ACC: [id number] EXAM:  XR CHEST PORTABLE IMMED 1V                          PROCEDURE DATE:  2023          INTERPRETATION:  CLINICAL STATEMENT: Chest Pain.    TECHNIQUE: AP view of the chest.    COMPARISON: 3/1/2022    FINDINGS/  IMPRESSION:  Sternotomy wires again noted.    New small to moderate size right pleural effusion with adjacent airspace   opacity. Trace left pleural effusion.    Heart size cannot be accurately assessed in this projection.    < end of copied text >        Imaging Personally Reviewed:  YES/NO    Consultant(s) Notes Reviewed:   YES/ No    Care Discussed with Consultants :     Plan of care was discussed with patient and /or primary care giver; all questions and concerns were addressed and care was aligned with patient's wishes.

## 2023-01-06 NOTE — PROGRESS NOTE ADULT - ASSESSMENT
Mechanical Fall  CABG with MR with AF with pulm htn  [place]  Anemia  Early dementia  HLD, GERD, BPH        PLAN- observation  Echo  LASIX x 1 week,, Lopressor xarelto  F/U CXR  in 3 days  P/T  Pt DNR and DNI  Family aware

## 2023-01-07 ENCOUNTER — TRANSCRIPTION ENCOUNTER (OUTPATIENT)
Age: 89
End: 2023-01-07

## 2023-01-08 LAB
CULTURE RESULTS: SIGNIFICANT CHANGE UP
CULTURE RESULTS: SIGNIFICANT CHANGE UP
SPECIMEN SOURCE: SIGNIFICANT CHANGE UP
SPECIMEN SOURCE: SIGNIFICANT CHANGE UP

## 2023-01-09 ENCOUNTER — TRANSCRIPTION ENCOUNTER (OUTPATIENT)
Age: 89
End: 2023-01-09

## 2023-01-12 ENCOUNTER — APPOINTMENT (OUTPATIENT)
Dept: CARE COORDINATION | Facility: HOME HEALTH | Age: 89
End: 2023-01-12
Payer: MEDICARE

## 2023-01-12 VITALS
DIASTOLIC BLOOD PRESSURE: 60 MMHG | HEART RATE: 72 BPM | SYSTOLIC BLOOD PRESSURE: 100 MMHG | OXYGEN SATURATION: 99 % | TEMPERATURE: 97.5 F | RESPIRATION RATE: 16 BRPM

## 2023-01-12 DIAGNOSIS — E53.8 DEFICIENCY OF OTHER SPECIFIED B GROUP VITAMINS: ICD-10-CM

## 2023-01-12 DIAGNOSIS — E78.5 HYPERLIPIDEMIA, UNSPECIFIED: ICD-10-CM

## 2023-01-12 DIAGNOSIS — I10 ESSENTIAL (PRIMARY) HYPERTENSION: ICD-10-CM

## 2023-01-12 DIAGNOSIS — N40.0 BENIGN PROSTATIC HYPERPLASIA WITHOUT LOWER URINARY TRACT SYMPMS: ICD-10-CM

## 2023-01-12 DIAGNOSIS — I48.91 UNSPECIFIED ATRIAL FIBRILLATION: ICD-10-CM

## 2023-01-12 DIAGNOSIS — I25.10 ATHEROSCLEROTIC HEART DISEASE OF NATIVE CORONARY ARTERY W/OUT ANGINA PECTORIS: ICD-10-CM

## 2023-01-12 DIAGNOSIS — F41.9 ANXIETY DISORDER, UNSPECIFIED: ICD-10-CM

## 2023-01-12 DIAGNOSIS — I48.0 PAROXYSMAL ATRIAL FIBRILLATION: ICD-10-CM

## 2023-01-12 PROCEDURE — 99350 HOME/RES VST EST HIGH MDM 60: CPT

## 2023-01-12 NOTE — PHYSICAL EXAM
[No Acute Distress] : no acute distress [Normal Sclera/Conjunctiva] : normal sclera/conjunctiva [Normal Outer Ear/Nose] : the outer ears and nose were normal in appearance [No Respiratory Distress] : no respiratory distress  [Clear to Auscultation] : lungs were clear to auscultation bilaterally [No Accessory Muscle Use] : no accessory muscle use [Normal S1, S2] : normal S1 and S2 [No Edema] : there was no peripheral edema [Soft] : abdomen soft [No Rash] : no rash

## 2023-01-13 PROBLEM — N40.0 BPH (BENIGN PROSTATIC HYPERPLASIA): Status: ACTIVE | Noted: 2023-01-13

## 2023-01-13 PROBLEM — E53.8 VITAMIN B12 DEFICIENCY: Status: ACTIVE | Noted: 2023-01-13

## 2023-01-13 PROBLEM — I48.91 AFIB: Status: ACTIVE | Noted: 2023-01-13

## 2023-01-13 PROBLEM — F41.9 ANXIETY: Status: ACTIVE | Noted: 2023-01-13

## 2023-01-13 RX ORDER — PAROXETINE HYDROCHLORIDE 10 MG/1
10 TABLET, FILM COATED ORAL DAILY
Refills: 0 | Status: ACTIVE | COMMUNITY
Start: 2023-01-13

## 2023-01-13 RX ORDER — ISOSORBIDE DINITRATE 20 MG/1
20 TABLET ORAL TWICE DAILY
Refills: 0 | Status: ACTIVE | COMMUNITY
Start: 2023-01-13

## 2023-01-13 RX ORDER — APIXABAN 2.5 MG/1
2.5 TABLET, FILM COATED ORAL
Qty: 60 | Refills: 3 | Status: ACTIVE | COMMUNITY
Start: 2023-01-13

## 2023-01-13 RX ORDER — FUROSEMIDE 20 MG/1
20 TABLET ORAL
Refills: 0 | Status: ACTIVE | COMMUNITY
Start: 2023-01-13

## 2023-01-13 RX ORDER — FOLIC ACID 1 MG/1
1 TABLET ORAL DAILY
Qty: 30 | Refills: 1 | Status: ACTIVE | COMMUNITY
Start: 2023-01-13

## 2023-01-13 RX ORDER — ATORVASTATIN CALCIUM 40 MG/1
40 TABLET, FILM COATED ORAL
Qty: 30 | Refills: 4 | Status: ACTIVE | COMMUNITY
Start: 2023-01-13

## 2023-01-13 RX ORDER — METOPROLOL SUCCINATE 25 MG/1
25 TABLET, EXTENDED RELEASE ORAL
Refills: 0 | Status: ACTIVE | COMMUNITY
Start: 2023-01-13

## 2023-01-13 RX ORDER — TAMSULOSIN HYDROCHLORIDE 0.4 MG/1
0.4 CAPSULE ORAL
Qty: 30 | Refills: 0 | Status: ACTIVE | COMMUNITY
Start: 2023-01-13

## 2023-01-13 NOTE — HEALTH RISK ASSESSMENT
[None] : None [With Significant Other] : lives with significant other [] :  [Reports changes in hearing] : Reports changes in hearing [Smoke Detector] : smoke detector [Carbon Monoxide Detector] : carbon monoxide detector [DNR] : DNR [DNI] : DNI [Change in mental status noted] : No change in mental status noted [Language] : denies difficulty with language

## 2023-01-13 NOTE — CURRENT MEDS
[Takes medication as prescribed] : takes [None] : Patient does not have any barriers to medication adherence [Yes] : Reviewed medication list for presence of high-risk medications. [Blood Thinners] : blood thinners [FreeTextEntry1] : Eliquis - no bleeding

## 2023-01-13 NOTE — REVIEW OF SYSTEMS
[Hearing Loss] : hearing loss [Incontinence] : incontinence [Fever] : no fever [Fatigue] : no fatigue [Vision Problems] : no vision problems [Palpitations] : no palpitations [Claudication] : no  leg claudication [Lower Ext Edema] : no lower extremity edema [Shortness Of Breath] : no shortness of breath [Wheezing] : no wheezing [Cough] : no cough [Abdominal Pain] : no abdominal pain [Nausea] : no nausea [Constipation] : no constipation [FreeTextEntry4] : wear hearing aids

## 2023-01-13 NOTE — COUNSELING
[Fall prevention counseling provided] : Fall prevention counseling provided [Adequate lighting] : Adequate lighting [Use proper foot wear] : Use proper foot wear [Use recommended devices] : Use recommended devices [No] : Risk of tobacco use and health benefits of smoking cessation discussed: No [None] : None [Good understanding] : Patient has a good understanding of lifestyle changes and steps needed to achieve self management goal

## 2023-01-13 NOTE — HISTORY OF PRESENT ILLNESS
[Post-hospitalization from ___ Hospital] : Post-hospitalization from [unfilled] Hospital [Admitted on: ___] : The patient was admitted on [unfilled] [FreeTextEntry2] : 89M coming form home, ambulates with RW, and limited HHA w/ PMHx BPH, HLD, GERD, CAD s/p CABG, CVA, chronic Afib (on ELIquis), and HTN brought in by HHA for AMS and SOB with fall yesterday. Admitted to [facility] for AHRF and  HTN \par emergency 219/103 in the setting of medication noncompliance. CXR shows b/l pleural effusion R>L, concern for parapneumonic effusion 2/2 malignancy shown in prior CT chest in June 2022. CTH shows chronic microvascular changes and right cerebellar infarcts, no change from previous imaging. Pt weaned to room air with saturation of 94-95% Echo with normal EF, severe pulmonary htn, LV remodeling HTN now improved - stable for discharge. Repeat EKG with prolonged Qtc now improved (around low 500 PT recs JOE however family prefers to take pt home with HHNP \par \par Home visit made with pt. Met him sitting at his kitchen table having breakfast. Pt's NP Audrey from [facility] Calls at the visit, pt's wife and his aide. Pt is alert and oriented, able to answer most of the questions. He is hard of hearing wearing a hearing aide lt ear. Pt reports he feels much better. Aide reports pt was weak yesterday, and confused for a few days after being discharged. Now she sees a turn around in his health. Pt's lungs are clear bilaterally, he is able to speak in full sentences. Not using accessory muscles. Aide reports pt is able to ambulate with his rollator and supervision. \par Aide reports appetite is improving, sleeping well at nights, daily bm. \par Informed  all present of TCM's role in pt's care. \par

## 2023-01-20 ENCOUNTER — TRANSCRIPTION ENCOUNTER (OUTPATIENT)
Age: 89
End: 2023-01-20

## 2023-01-21 ENCOUNTER — TRANSCRIPTION ENCOUNTER (OUTPATIENT)
Age: 89
End: 2023-01-21

## 2023-01-21 ENCOUNTER — INPATIENT (INPATIENT)
Facility: HOSPITAL | Age: 89
LOS: 3 days | Discharge: NURSING, REHAB OR LONG-TERM CARE | DRG: 70 | End: 2023-01-25
Attending: INTERNAL MEDICINE | Admitting: INTERNAL MEDICINE
Payer: MEDICARE

## 2023-01-21 VITALS
HEART RATE: 83 BPM | OXYGEN SATURATION: 97 % | SYSTOLIC BLOOD PRESSURE: 171 MMHG | RESPIRATION RATE: 16 BRPM | TEMPERATURE: 99 F | HEIGHT: 67 IN | WEIGHT: 115.08 LBS | DIASTOLIC BLOOD PRESSURE: 73 MMHG

## 2023-01-21 DIAGNOSIS — Z95.1 PRESENCE OF AORTOCORONARY BYPASS GRAFT: Chronic | ICD-10-CM

## 2023-01-21 PROCEDURE — 99285 EMERGENCY DEPT VISIT HI MDM: CPT

## 2023-01-21 NOTE — ED ADULT TRIAGE NOTE - CHIEF COMPLAINT QUOTE
liliana from home for increase confusion, attempted to leave house but failed and as per giver, she wasn't there when it happened. on scene BGL was 130 patient is DNR with document attached

## 2023-01-22 DIAGNOSIS — G93.40 ENCEPHALOPATHY, UNSPECIFIED: ICD-10-CM

## 2023-01-22 DIAGNOSIS — I10 ESSENTIAL (PRIMARY) HYPERTENSION: ICD-10-CM

## 2023-01-22 DIAGNOSIS — Z86.79 PERSONAL HISTORY OF OTHER DISEASES OF THE CIRCULATORY SYSTEM: ICD-10-CM

## 2023-01-22 DIAGNOSIS — R41.82 ALTERED MENTAL STATUS, UNSPECIFIED: ICD-10-CM

## 2023-01-22 DIAGNOSIS — Z29.9 ENCOUNTER FOR PROPHYLACTIC MEASURES, UNSPECIFIED: ICD-10-CM

## 2023-01-22 LAB
ALBUMIN SERPL ELPH-MCNC: 3.3 G/DL — LOW (ref 3.5–5)
ALBUMIN SERPL ELPH-MCNC: 3.6 G/DL — SIGNIFICANT CHANGE UP (ref 3.5–5)
ALP SERPL-CCNC: 105 U/L — SIGNIFICANT CHANGE UP (ref 40–120)
ALP SERPL-CCNC: 125 U/L — HIGH (ref 40–120)
ALT FLD-CCNC: 44 U/L DA — SIGNIFICANT CHANGE UP (ref 10–60)
ALT FLD-CCNC: 52 U/L DA — SIGNIFICANT CHANGE UP (ref 10–60)
ANION GAP SERPL CALC-SCNC: 10 MMOL/L — SIGNIFICANT CHANGE UP (ref 5–17)
ANION GAP SERPL CALC-SCNC: 10 MMOL/L — SIGNIFICANT CHANGE UP (ref 5–17)
APPEARANCE UR: CLEAR — SIGNIFICANT CHANGE UP
APTT BLD: 37.9 SEC — HIGH (ref 27.5–35.5)
AST SERPL-CCNC: 31 U/L — SIGNIFICANT CHANGE UP (ref 10–40)
AST SERPL-CCNC: 45 U/L — HIGH (ref 10–40)
BACTERIA # UR AUTO: ABNORMAL /HPF
BASOPHILS # BLD AUTO: 0.05 K/UL — SIGNIFICANT CHANGE UP (ref 0–0.2)
BASOPHILS # BLD AUTO: 0.06 K/UL — SIGNIFICANT CHANGE UP (ref 0–0.2)
BASOPHILS NFR BLD AUTO: 0.7 % — SIGNIFICANT CHANGE UP (ref 0–2)
BASOPHILS NFR BLD AUTO: 0.8 % — SIGNIFICANT CHANGE UP (ref 0–2)
BILIRUB SERPL-MCNC: 0.4 MG/DL — SIGNIFICANT CHANGE UP (ref 0.2–1.2)
BILIRUB SERPL-MCNC: 0.6 MG/DL — SIGNIFICANT CHANGE UP (ref 0.2–1.2)
BILIRUB UR-MCNC: NEGATIVE — SIGNIFICANT CHANGE UP
BUN SERPL-MCNC: 26 MG/DL — HIGH (ref 7–18)
BUN SERPL-MCNC: 30 MG/DL — HIGH (ref 7–18)
CALCIUM SERPL-MCNC: 8.5 MG/DL — SIGNIFICANT CHANGE UP (ref 8.4–10.5)
CALCIUM SERPL-MCNC: 8.6 MG/DL — SIGNIFICANT CHANGE UP (ref 8.4–10.5)
CHLORIDE SERPL-SCNC: 109 MMOL/L — HIGH (ref 96–108)
CHLORIDE SERPL-SCNC: 112 MMOL/L — HIGH (ref 96–108)
CO2 SERPL-SCNC: 21 MMOL/L — LOW (ref 22–31)
CO2 SERPL-SCNC: 23 MMOL/L — SIGNIFICANT CHANGE UP (ref 22–31)
COLOR SPEC: YELLOW — SIGNIFICANT CHANGE UP
CREAT SERPL-MCNC: 1.37 MG/DL — HIGH (ref 0.5–1.3)
CREAT SERPL-MCNC: 1.43 MG/DL — HIGH (ref 0.5–1.3)
DIFF PNL FLD: ABNORMAL
EGFR: 43 ML/MIN/1.73M2 — LOW
EGFR: 45 ML/MIN/1.73M2 — LOW
EOSINOPHIL # BLD AUTO: 0.06 K/UL — SIGNIFICANT CHANGE UP (ref 0–0.5)
EOSINOPHIL # BLD AUTO: 0.07 K/UL — SIGNIFICANT CHANGE UP (ref 0–0.5)
EOSINOPHIL NFR BLD AUTO: 0.8 % — SIGNIFICANT CHANGE UP (ref 0–6)
EOSINOPHIL NFR BLD AUTO: 1 % — SIGNIFICANT CHANGE UP (ref 0–6)
EPI CELLS # UR: SIGNIFICANT CHANGE UP /HPF
FLUAV AG NPH QL: SIGNIFICANT CHANGE UP
FLUBV AG NPH QL: SIGNIFICANT CHANGE UP
GLUCOSE SERPL-MCNC: 115 MG/DL — HIGH (ref 70–99)
GLUCOSE SERPL-MCNC: 124 MG/DL — HIGH (ref 70–99)
GLUCOSE UR QL: NEGATIVE — SIGNIFICANT CHANGE UP
HCT VFR BLD CALC: 27.2 % — LOW (ref 39–50)
HCT VFR BLD CALC: 27.4 % — LOW (ref 39–50)
HGB BLD-MCNC: 8.8 G/DL — LOW (ref 13–17)
HGB BLD-MCNC: 8.9 G/DL — LOW (ref 13–17)
IMM GRANULOCYTES NFR BLD AUTO: 0.3 % — SIGNIFICANT CHANGE UP (ref 0–0.9)
IMM GRANULOCYTES NFR BLD AUTO: 0.4 % — SIGNIFICANT CHANGE UP (ref 0–0.9)
INR BLD: 1.56 RATIO — HIGH (ref 0.88–1.16)
KETONES UR-MCNC: NEGATIVE — SIGNIFICANT CHANGE UP
LACTATE SERPL-SCNC: 1.4 MMOL/L — SIGNIFICANT CHANGE UP (ref 0.7–2)
LEUKOCYTE ESTERASE UR-ACNC: NEGATIVE — SIGNIFICANT CHANGE UP
LYMPHOCYTES # BLD AUTO: 1.31 K/UL — SIGNIFICANT CHANGE UP (ref 1–3.3)
LYMPHOCYTES # BLD AUTO: 1.48 K/UL — SIGNIFICANT CHANGE UP (ref 1–3.3)
LYMPHOCYTES # BLD AUTO: 17.9 % — SIGNIFICANT CHANGE UP (ref 13–44)
LYMPHOCYTES # BLD AUTO: 20.3 % — SIGNIFICANT CHANGE UP (ref 13–44)
MAGNESIUM SERPL-MCNC: 1.3 MG/DL — LOW (ref 1.6–2.6)
MCHC RBC-ENTMCNC: 32.4 GM/DL — SIGNIFICANT CHANGE UP (ref 32–36)
MCHC RBC-ENTMCNC: 32.5 GM/DL — SIGNIFICANT CHANGE UP (ref 32–36)
MCHC RBC-ENTMCNC: 33.1 PG — SIGNIFICANT CHANGE UP (ref 27–34)
MCHC RBC-ENTMCNC: 33.2 PG — SIGNIFICANT CHANGE UP (ref 27–34)
MCV RBC AUTO: 101.9 FL — HIGH (ref 80–100)
MCV RBC AUTO: 102.6 FL — HIGH (ref 80–100)
MONOCYTES # BLD AUTO: 0.8 K/UL — SIGNIFICANT CHANGE UP (ref 0–0.9)
MONOCYTES # BLD AUTO: 1.03 K/UL — HIGH (ref 0–0.9)
MONOCYTES NFR BLD AUTO: 11 % — SIGNIFICANT CHANGE UP (ref 2–14)
MONOCYTES NFR BLD AUTO: 14.1 % — HIGH (ref 2–14)
NEUTROPHILS # BLD AUTO: 4.62 K/UL — SIGNIFICANT CHANGE UP (ref 1.8–7.4)
NEUTROPHILS # BLD AUTO: 5.05 K/UL — SIGNIFICANT CHANGE UP (ref 1.8–7.4)
NEUTROPHILS NFR BLD AUTO: 63.5 % — SIGNIFICANT CHANGE UP (ref 43–77)
NEUTROPHILS NFR BLD AUTO: 69.2 % — SIGNIFICANT CHANGE UP (ref 43–77)
NITRITE UR-MCNC: NEGATIVE — SIGNIFICANT CHANGE UP
NRBC # BLD: 0 /100 WBCS — SIGNIFICANT CHANGE UP (ref 0–0)
NRBC # BLD: 0 /100 WBCS — SIGNIFICANT CHANGE UP (ref 0–0)
PH UR: 5 — SIGNIFICANT CHANGE UP (ref 5–8)
PHOSPHATE SERPL-MCNC: 3 MG/DL — SIGNIFICANT CHANGE UP (ref 2.5–4.5)
PLATELET # BLD AUTO: 170 K/UL — SIGNIFICANT CHANGE UP (ref 150–400)
PLATELET # BLD AUTO: 183 K/UL — SIGNIFICANT CHANGE UP (ref 150–400)
POTASSIUM SERPL-MCNC: 3.7 MMOL/L — SIGNIFICANT CHANGE UP (ref 3.5–5.3)
POTASSIUM SERPL-MCNC: 4.1 MMOL/L — SIGNIFICANT CHANGE UP (ref 3.5–5.3)
POTASSIUM SERPL-SCNC: 3.7 MMOL/L — SIGNIFICANT CHANGE UP (ref 3.5–5.3)
POTASSIUM SERPL-SCNC: 4.1 MMOL/L — SIGNIFICANT CHANGE UP (ref 3.5–5.3)
PROT SERPL-MCNC: 6.3 G/DL — SIGNIFICANT CHANGE UP (ref 6–8.3)
PROT SERPL-MCNC: 6.8 G/DL — SIGNIFICANT CHANGE UP (ref 6–8.3)
PROT UR-MCNC: 30 MG/DL
PROTHROM AB SERPL-ACNC: 18.7 SEC — HIGH (ref 10.5–13.4)
RBC # BLD: 2.65 M/UL — LOW (ref 4.2–5.8)
RBC # BLD: 2.69 M/UL — LOW (ref 4.2–5.8)
RBC # FLD: 13.2 % — SIGNIFICANT CHANGE UP (ref 10.3–14.5)
RBC # FLD: 13.2 % — SIGNIFICANT CHANGE UP (ref 10.3–14.5)
RBC CASTS # UR COMP ASSIST: NEGATIVE /HPF — SIGNIFICANT CHANGE UP (ref 0–2)
SARS-COV-2 RNA SPEC QL NAA+PROBE: SIGNIFICANT CHANGE UP
SODIUM SERPL-SCNC: 142 MMOL/L — SIGNIFICANT CHANGE UP (ref 135–145)
SODIUM SERPL-SCNC: 143 MMOL/L — SIGNIFICANT CHANGE UP (ref 135–145)
SP GR SPEC: 1.01 — SIGNIFICANT CHANGE UP (ref 1.01–1.02)
UROBILINOGEN FLD QL: NEGATIVE — SIGNIFICANT CHANGE UP
WBC # BLD: 7.28 K/UL — SIGNIFICANT CHANGE UP (ref 3.8–10.5)
WBC # BLD: 7.3 K/UL — SIGNIFICANT CHANGE UP (ref 3.8–10.5)
WBC # FLD AUTO: 7.28 K/UL — SIGNIFICANT CHANGE UP (ref 3.8–10.5)
WBC # FLD AUTO: 7.3 K/UL — SIGNIFICANT CHANGE UP (ref 3.8–10.5)
WBC UR QL: SIGNIFICANT CHANGE UP /HPF (ref 0–5)

## 2023-01-22 PROCEDURE — 93010 ELECTROCARDIOGRAM REPORT: CPT

## 2023-01-22 PROCEDURE — 70450 CT HEAD/BRAIN W/O DYE: CPT | Mod: 26,MA

## 2023-01-22 PROCEDURE — 71045 X-RAY EXAM CHEST 1 VIEW: CPT | Mod: 26

## 2023-01-22 RX ORDER — METOPROLOL TARTRATE 50 MG
25 TABLET ORAL DAILY
Refills: 0 | Status: DISCONTINUED | OUTPATIENT
Start: 2023-01-22 | End: 2023-01-25

## 2023-01-22 RX ORDER — ISOSORBIDE DINITRATE 5 MG/1
20 TABLET ORAL
Refills: 0 | Status: DISCONTINUED | OUTPATIENT
Start: 2023-01-22 | End: 2023-01-25

## 2023-01-22 RX ORDER — ACETAMINOPHEN 500 MG
650 TABLET ORAL EVERY 6 HOURS
Refills: 0 | Status: DISCONTINUED | OUTPATIENT
Start: 2023-01-22 | End: 2023-01-25

## 2023-01-22 RX ORDER — SODIUM CHLORIDE 9 MG/ML
500 INJECTION INTRAMUSCULAR; INTRAVENOUS; SUBCUTANEOUS ONCE
Refills: 0 | Status: COMPLETED | OUTPATIENT
Start: 2023-01-22 | End: 2023-01-22

## 2023-01-22 RX ORDER — APIXABAN 2.5 MG/1
2.5 TABLET, FILM COATED ORAL EVERY 12 HOURS
Refills: 0 | Status: DISCONTINUED | OUTPATIENT
Start: 2023-01-22 | End: 2023-01-25

## 2023-01-22 RX ORDER — FUROSEMIDE 40 MG
20 TABLET ORAL DAILY
Refills: 0 | Status: DISCONTINUED | OUTPATIENT
Start: 2023-01-22 | End: 2023-01-25

## 2023-01-22 RX ORDER — ATORVASTATIN CALCIUM 80 MG/1
40 TABLET, FILM COATED ORAL AT BEDTIME
Refills: 0 | Status: DISCONTINUED | OUTPATIENT
Start: 2023-01-22 | End: 2023-01-25

## 2023-01-22 RX ORDER — AMLODIPINE BESYLATE 2.5 MG/1
5 TABLET ORAL DAILY
Refills: 0 | Status: DISCONTINUED | OUTPATIENT
Start: 2023-01-22 | End: 2023-01-24

## 2023-01-22 RX ADMIN — ISOSORBIDE DINITRATE 20 MILLIGRAM(S): 5 TABLET ORAL at 17:45

## 2023-01-22 RX ADMIN — AMLODIPINE BESYLATE 5 MILLIGRAM(S): 2.5 TABLET ORAL at 10:06

## 2023-01-22 RX ADMIN — Medication 650 MILLIGRAM(S): at 11:00

## 2023-01-22 RX ADMIN — APIXABAN 2.5 MILLIGRAM(S): 2.5 TABLET, FILM COATED ORAL at 17:46

## 2023-01-22 RX ADMIN — Medication 10 MILLIGRAM(S): at 11:45

## 2023-01-22 RX ADMIN — Medication 650 MILLIGRAM(S): at 10:04

## 2023-01-22 RX ADMIN — ATORVASTATIN CALCIUM 40 MILLIGRAM(S): 80 TABLET, FILM COATED ORAL at 23:44

## 2023-01-22 RX ADMIN — Medication 25 MILLIGRAM(S): at 17:45

## 2023-01-22 RX ADMIN — SODIUM CHLORIDE 1000 MILLILITER(S): 9 INJECTION INTRAMUSCULAR; INTRAVENOUS; SUBCUTANEOUS at 04:09

## 2023-01-22 NOTE — H&P ADULT - PROBLEM SELECTOR PLAN 1
presents w/ AMS, recently discharged after HTN urgency  Afebrile, hemodynamically stable  No leukocytosis  CTH negative  s/p 500cc bolus   no sign of infection   patient AMS likely 2/2 progressive decline due to age   Social work consult for possible placement

## 2023-01-22 NOTE — ED PROVIDER NOTE - OBJECTIVE STATEMENT
History limited from patient secondary to AMS and hard of hearing, history obtained from chart review and from caregiver at bedside.    89-year-old male with past medical history BPH, hyperlipidemia, GERD, CAD status post CABG, CVA, atrial fibrillation on Eliquis, hypertension presents with altered mental status worsening over the past few weeks.  Per caregiver at bedside, patient lives with wife who is also elderly, states they have home health aide for part of the day.  Home health aide states that patient's has been more confused over the past week with agitation, states patient attempted to leave the house today.  Denies any injury or fall.  Denies any fevers, difficulty breathing, vomiting, bloody stools, or rash.  Denies any additional complaints.

## 2023-01-22 NOTE — PATIENT PROFILE ADULT - NSPRESCRALCSCORE_GEN_A_NUR_CAL
R:  Patient cleared and ready for behavioral bed placement: Yes     Bed search update @11:00am & 2:30pm      [facility] @ cap  [facility] - N ot reviewing w/no avaiability  [facility]: no availability -  Stated to call tomorrow after 9am  [facility] - no kid openeings for review     Pt remains on wait list until appropriate placement is available    
1

## 2023-01-22 NOTE — ED ADULT NURSE NOTE - NSIMPLEMENTINTERV_GEN_ALL_ED
Implemented All Fall with Harm Risk Interventions:  [place] to call system. Call bell, personal items and telephone within reach. Instruct patient to call for assistance. Room bathroom lighting operational. Non-slip footwear when patient is off stretcher. Physically safe environment: no spills, clutter or unnecessary equipment. Stretcher in lowest position, wheels locked, appropriate side rails in place. Provide visual cue, wrist band, yellow gown, etc. Monitor gait and stability. Monitor for mental status changes and reorient to person, place, and time. Review medications for side effects contributing to fall risk. Reinforce activity limits and safety measures with patient and family. Provide visual clues: red socks.

## 2023-01-22 NOTE — ED ADULT NURSE NOTE - OBJECTIVE STATEMENT
Patient presented to the ED with c/o increased in confusion as per the caregiver. Generalized bruises, abrasion to upper and lower extremities rash and redness noted to sacrum. Pitting edema noted to B/L LEs.

## 2023-01-22 NOTE — H&P ADULT - NSHPPHYSICALEXAM_GEN_ALL_CORE
PHYSICAL EXAMINATION:  GENERAL: NAD,   HEAD:  Atraumatic, Normocephalic  EYES:  conjunctiva and sclera clear  NECK: Supple, No JVD, Normal thyroid  CHEST/LUNG: Clear to auscultation. Clear to percussion bilaterally; No rales, rhonchi, wheezing, or rubs  HEART: Regular rate and rhythm; No murmurs, rubs, or gallops  ABDOMEN: Soft, Nontender, Nondistended; Bowel sounds present  NERVOUS SYSTEM:  Alert & Oriented X1,  answering most questions but gets confused, moving all extremities   EXTREMITIES:  2+ Peripheral Pulses, No clubbing, cyanosis, or edema  SKIN: warm dry

## 2023-01-22 NOTE — ED PROVIDER NOTE - PROGRESS NOTE DETAILS
Lucks-DO: discussed with daughter Siomara, states pt lives alone with elderly wife, does not have 24 hour aid. Medical workup without acute explanation of AMS. Will admit to ensure safe discharge/assistance at home. Discussed with Dr. Marshall/MAR re: admission.

## 2023-01-22 NOTE — PATIENT PROFILE ADULT - NSPROHARDOFHEAROTHER_GEN_ALL_CORE
A magnifying glass may help. Son reports that he doesn't hear anything from the right ear. Has hearing aid to left ear but it's not working as per family.

## 2023-01-22 NOTE — CONSULT NOTE ADULT - SUBJECTIVE AND OBJECTIVE BOX
PULMONARY CONSULT NOTE      ROSARIO RUIZ  MRN-[id number]    Patient is a 89y old  Male who presents with a chief complaint of confusion, occasional cough  Hx chart lab and xrays reviewed, Pt was recently discharged  2 weeks ago from [facility]    HISTORY OF PRESENT ILLNESS:     89-year-old male with past medical history BPH, hyperlipidemia, GERD, CAD status post CABG, CVA, atrial fibrillation on Eliquis, hypertension presents with altered mental status worsening over the past few weeks. Patient is unable to provide any history.  Per chart review, patients  Home health aide states that patient's has been more confused over the past week with agitation, states patient attempted to leave the house today.  Denies any injury or fall.  Denies any fevers, difficulty breathing, vomiting, bloody stools, or rash.  Denies any additional complaints. Patient was last discharged on  for HTN emergency and at that time JOE was recommended but family refused     In the ED:  Afebrile, hemodynamically stable  No leukocytosis  CTH negative    Home Medications:  atorvastatin 40 mg oral tablet: 1 tab(s) orally once a day (2023 22:50)  Flomax 0.4 mg oral capsule: 1 cap(s) orally once a day (2023 22:50)  isosorbide dinitrate 20 mg oral tablet: 1 tab(s) orally 2 times  (2023 22:51)  PARoxetine 10 mg oral tablet: 1 tab(s) orally once a day (2023 22:52)  Toprol-XL 25 mg oral tablet, extended release: 1 tab(s) orally once a day (2023 22:51)        Allergies    No Known Allergies            PAST MEDICAL & SURGICAL HISTORY:   Hypertension  Coronary artery disease  History of atrial fibrillation  S/P CABG (coronary artery bypass graft)  Recent dementia  CVA hx          FAMILY HISTORY:  HTN+    DM--   IHD--   Asthma--  COPD--     SOCIAL HISTORY SMOKING--    ETOH --    DRUGS--  Non smoker,  with children, [address]        REVIEW OF SYSTEMS: as per family  CONSTITUTIONAL: No fever, weight loss, or fatigue   EYES: No eye pain, visual disturbances, or discharge  ENT:  No difficulty hearing, tinnitus, vertigo; No sinus or throat pain  NECK: No pain or stiffness   RESPIRATORY:  cough+   wheezing--   chills--   hemoptysis--    Shortness of Breath--  CARDIOVASCULAR: No chest pain, palpitations, passing out, dizziness, or leg swelling  GASTROINTESTINAL: No abdominal or epigastric pain. No nausea, vomiting,   GENITOURINARY: No dysuria, frequency, hematuria, or incontinence  NEUROLOGICAL: No headaches, but  memory loss++, no loss of strength, numbness, or tremors  SKIN: No itching, burning, rashes, or lesions   LYMPH Nodes: No enlarged glands  ENDOCRINE: No heat or cold intolerance; No hair loss  ALLERGY AND IMMUNOLOGIC: No hives or eczema      Vital Signs Last 24 Hrs  T(C): 36.8 (2023 12:34), Max: 37.1 (2023 23:47)  T(F): 98.2 (2023 12:34), Max: 98.7 (2023 23:47)  HR: 76 (2023 12:34) (70 - 84)  BP: 146/61 (2023 12:34) (124/56 - 171/73)  BP(mean): --  RR: 17 (2023 12:34) (16 - 17)  SpO2: 95% (2023 12:34) (93% - 97%)    Parameters below as of 2023 12:34  Patient On (Oxygen Delivery Method): room air      I&O's Detail      PHYSICAL EXAMINATION:    GENERAL: The patient is a well-developed, elderly male in no apparent distress.   SKIN: No rashes ecchymoses or cyanosis  HEENT: Head is normocephalic and atraumatic. Extraocular muscles are intact. Mucous membranes are moist.   Neck supple LN not felt, JVP not increased  Thyroid not enlarged  Lymphatic: No lymphadenopathy  Cardiovascular:  S1 S2  heard , AF,, JVP not increased , systolic  murmur at apex, No  gallop or rub  Respiratory:  Symmetrical chest wall movements Breathing vesicular , Percussion note normal no dulness   with  no  rales  or  wheeze  ABDOMEN:  Soft, Non-tender,   No  hepatosplenomegaly ,BS positive		  Extremities: Normal range of motion, No clubbing, cyanosis or edema , No calf tenderness, Scar rt forearm and upper arm  Vascular: Peripheral pulses palpable 2+ bilaterally  CNS: Awake but confused   Babinski neg        LABS:                        8.9    7.30  )-----------( 170      ( 2023 10:50 )             27.4         143  |  112<H>  |  26<H>  ----------------------------<  124<H>  3.7   |  21<L>  |  1.37<H>    Ca    8.6      2023 10:50  Phos  3.0       Mg     1.3         TPro  6.3  /  Alb  3.3<L>  /  TBili  0.6  /  DBili  x   /  AST  45<H>  /  ALT  52  /  AlkPhos  105      PT/INR - ( 2023 00:59 )   PT: 18.7 sec;   INR: 1.56 ratio         PTT - ( 2023 00:59 )  PTT:37.9 sec  Urinalysis Basic - ( 2023 05:53 )    Color: Yellow / Appearance: Clear / S.015 / pH: x  Gluc: x / Ketone: Negative  / Bili: Negative / Urobili: Negative   Blood: x / Protein: 30 mg/dL / Nitrite: Negative   Leuk Esterase: Negative / RBC: Negative /HPF / WBC 0-2 /HPF   Sq Epi: x / Non Sq Epi: Few /HPF / Bacteria: Few /HPF      Lactate, Blood: 1.4 mmol/L (23 @ 00:59)    RADIOLOGY & ADDITIONAL STUDIES:    CXR: no infiltrate, CABG  sutures    Ct scan Head;; No acute problem    ekg ; AF, Non specific ST-T changes     PULMONARY CONSULT NOTE      ROSARIO RUIZ  MRN-[id number]    Patient is a 89y old  Male who presents with a chief complaint of confusion, occasional cough  Hx chart lab and xrays reviewed, Pt was recently discharged  2 weeks ago from [facility]  Pt examined in ER and discussed with Dr Marshall      HISTORY OF PRESENT ILLNESS:     89-year-old male with past medical history BPH, hyperlipidemia, GERD, CAD status post CABG, CVA, atrial fibrillation on Eliquis, hypertension presents with altered mental status worsening over the past few weeks. Patient is unable to provide any history.  Per chart review, patients  Home health aide states that patient's has been more confused over the past week with agitation, states patient attempted to leave the house today.  Denies any injury or fall.  Denies any fevers, difficulty breathing, vomiting, bloody stools, or rash.  Denies any additional complaints. Patient was last discharged on  for HTN emergency and at that time JOE was recommended but family refused     In the ED:  Afebrile, hemodynamically stable  No leukocytosis  CTH negative    Home Medications:  atorvastatin 40 mg oral tablet: 1 tab(s) orally once a day (2023 22:50)  Flomax 0.4 mg oral capsule: 1 cap(s) orally once a day (2023 22:50)  isosorbide dinitrate 20 mg oral tablet: 1 tab(s) orally 2 times  (2023 22:51)  PARoxetine 10 mg oral tablet: 1 tab(s) orally once a day (2023 22:52)  Toprol-XL 25 mg oral tablet, extended release: 1 tab(s) orally once a day (2023 22:51)        Allergies    No Known Allergies            PAST MEDICAL & SURGICAL HISTORY:   Hypertension  Coronary artery disease  History of atrial fibrillation  S/P CABG (coronary artery bypass graft)  Recent dementia  CVA hx          FAMILY HISTORY:  HTN+    DM--   IHD--   Asthma--  COPD--     SOCIAL HISTORY SMOKING--    ETOH --    DRUGS--  Non smoker,  with children, One Is         REVIEW OF SYSTEMS: as per family  CONSTITUTIONAL: No fever, weight loss, or fatigue   EYES: No eye pain, visual disturbances, or discharge  ENT:  No difficulty hearing, tinnitus, vertigo; No sinus or throat pain  NECK: No pain or stiffness   RESPIRATORY:  cough+   wheezing--   chills--   hemoptysis--    Shortness of Breath--  CARDIOVASCULAR: No chest pain, palpitations, passing out, dizziness, or leg swelling  GASTROINTESTINAL: No abdominal or epigastric pain. No nausea, vomiting,   GENITOURINARY: No dysuria, frequency, hematuria, or incontinence  NEUROLOGICAL: No headaches, but  memory loss++, no loss of strength, numbness, or tremors  SKIN: No itching, burning, rashes, or lesions   LYMPH Nodes: No enlarged glands  ENDOCRINE: No heat or cold intolerance; No hair loss  ALLERGY AND IMMUNOLOGIC: No hives or eczema      Vital Signs Last 24 Hrs  T(C): 36.8 (2023 12:34), Max: 37.1 (2023 23:47)  T(F): 98.2 (2023 12:34), Max: 98.7 (2023 23:47)  HR: 76 (2023 12:34) (70 - 84)  BP: 146/61 (2023 12:34) (124/56 - 171/73)  BP(mean): --  RR: 17 (2023 12:34) (16 - 17)  SpO2: 95% (2023 12:34) (93% - 97%)    Parameters below as of 2023 12:34  Patient On (Oxygen Delivery Method): room air      I&O's Detail      PHYSICAL EXAMINATION:    GENERAL: The patient is a well-developed, elderly male in no apparent distress.   SKIN: No rashes ecchymoses or cyanosis  HEENT: Head is normocephalic and atraumatic. Extraocular muscles are intact. Mucous membranes are moist.   Neck supple LN not felt, JVP not increased  Thyroid not enlarged  Lymphatic: No lymphadenopathy  Cardiovascular:  S1 S2  heard , AF,, JVP not increased , systolic  murmur at apex, No  gallop or rub  Respiratory:  Symmetrical chest wall movements Breathing vesicular , Percussion note normal no dulness   with  no  rales  or  wheeze  ABDOMEN:  Soft, Non-tender,   No  hepatosplenomegaly ,BS positive		  Extremities: Normal range of motion, No clubbing, cyanosis or edema , No calf tenderness, Scar rt forearm and upper arm  Vascular: Peripheral pulses palpable 2+ bilaterally  CNS: Awake but confused   Babinski neg        LABS:                        8.9    7.30  )-----------( 170      ( 2023 10:50 )             27.4     01-    143  |  112<H>  |  26<H>  ----------------------------<  124<H>  3.7   |  21<L>  |  1.37<H>    Ca    8.6      2023 10:50  Phos  3.0       Mg     1.3         TPro  6.3  /  Alb  3.3<L>  /  TBili  0.6  /  DBili  x   /  AST  45<H>  /  ALT  52  /  AlkPhos  105      PT/INR - ( 2023 00:59 )   PT: 18.7 sec;   INR: 1.56 ratio         PTT - ( 2023 00:59 )  PTT:37.9 sec  Urinalysis Basic - ( 2023 05:53 )    Color: Yellow / Appearance: Clear / S.015 / pH: x  Gluc: x / Ketone: Negative  / Bili: Negative / Urobili: Negative   Blood: x / Protein: 30 mg/dL / Nitrite: Negative   Leuk Esterase: Negative / RBC: Negative /HPF / WBC 0-2 /HPF   Sq Epi: x / Non Sq Epi: Few /HPF / Bacteria: Few /HPF      Lactate, Blood: 1.4 mmol/L (23 @ 00:59)    RADIOLOGY & ADDITIONAL STUDIES:    CXR: no infiltrate, CABG  sutures    Ct scan Head;; No acute problem    ekg ; AF, Non specific ST-T changes

## 2023-01-22 NOTE — H&P ADULT - HISTORY OF PRESENT ILLNESS
89-year-old male with past medical history BPH, hyperlipidemia, GERD, CAD status post CABG, CVA, atrial fibrillation on Eliquis, hypertension presents with altered mental status worsening over the past few weeks. Patient is unable to provide any history.  Per chart review, patients  Home health aide states that patient's has been more confused over the past week with agitation, states patient attempted to leave the house today.  Denies any injury or fall.  Denies any fevers, difficulty breathing, vomiting, bloody stools, or rash.  Denies any additional complaints. Patient was last discharged on Jan 5th for HTN emergency     In the ED:  Afebrile, hemodynamically stable  No leukocytosis  CTH negative  s/p 500cc bolus    89-year-old male with past medical history BPH, hyperlipidemia, GERD, CAD status post CABG, CVA, atrial fibrillation on Eliquis, hypertension presents with altered mental status worsening over the past few weeks. Patient is unable to provide any history.  Per chart review, patients  Home health aide states that patient's has been more confused over the past week with agitation, states patient attempted to leave the house today.  Denies any injury or fall.  Denies any fevers, difficulty breathing, vomiting, bloody stools, or rash.  Denies any additional complaints. Patient was last discharged on Jan 5th for HTN emergency and at that time JOE was recommended but family refused     In the ED:  Afebrile, hemodynamically stable  No leukocytosis  CTH negative  s/p 500cc bolus

## 2023-01-22 NOTE — H&P ADULT - ASSESSMENT
89-year-old male with past medical history BPH, hyperlipidemia, GERD, CAD status post CABG, CVA, atrial fibrillation on Eliquis, hypertension presents with altered mental status worsening over the past few weeks. There are no signs of infection, and patient likely is progressively declining due to age. Will admit for possible placement.

## 2023-01-22 NOTE — H&P ADULT - NSHPLABSRESULTS_GEN_ALL_CORE
LABS:                        8.8    7.28  )-----------( 183      ( 2023 00:59 )             27.2         142  |  109<H>  |  30<H>  ----------------------------<  115<H>  4.1   |  23  |  1.43<H>    Ca    8.5      2023 00:59    TPro  6.8  /  Alb  3.6  /  TBili  0.4  /  DBili  x   /  AST  31  /  ALT  44  /  AlkPhos  125<H>      PT/INR - ( 2023 00:59 )   PT: 18.7 sec;   INR: 1.56 ratio         PTT - ( 2023 00:59 )  PTT:37.9 sec  Urinalysis Basic - ( 2023 05:53 )    Color: Yellow / Appearance: Clear / S.015 / pH: x  Gluc: x / Ketone: Negative  / Bili: Negative / Urobili: Negative   Blood: x / Protein: 30 mg/dL / Nitrite: Negative   Leuk Esterase: Negative / RBC: Negative /HPF / WBC 0-2 /HPF   Sq Epi: x / Non Sq Epi: Few /HPF / Bacteria: Few /HPF      LIVER FUNCTIONS - ( 2023 00:59 )  Alb: 3.6 g/dL / Pro: 6.8 g/dL / ALK PHOS: 125 U/L / ALT: 44 U/L DA / AST: 31 U/L / GGT: x           Lactate, Blood: 1.4 mmol/L (23 @ 00:59)

## 2023-01-22 NOTE — H&P ADULT - PROBLEM SELECTOR PLAN 4
IMPROVE VTE Individual Risk Assessment          RISK                                                          Points  [  ] Previous VTE                                                3  [  ] Thrombophilia                                             2  [  ] Lower limb paralysis                                   2        (unable to hold up >15 seconds)    [  ] Current Cancer                                             2         (within 6 months)  [ x ] Immobilization > 24 hrs                              1  [  ] ICU/CCU stay > 24 hours                             1  [ x ] Age > 60                                                         1    IMPROVE VTE Score:         [    2     ]    Eliquis

## 2023-01-22 NOTE — ED PROVIDER NOTE - CLINICAL SUMMARY MEDICAL DECISION MAKING FREE TEXT BOX
Rafy: 89-year-old male with past medical history BPH, hyperlipidemia, GERD, CAD status post CABG, CVA, atrial fibrillation on Eliquis, hypertension presents with altered mental status worsening over the past few weeks.  Per caregiver at bedside, patient lives with wife who is also elderly, states they have home health aide for part of the day.  Home health aide states that patient's has been more confused over the past week with agitation, states patient attempted to leave the house today.  Denies any injury or fall.  Denies any fevers, difficulty breathing, vomiting, bloody stools, or rash. Neuro exam nonfocal, vital signs non-actionable. Unclear etiology ddx includes but not limited to infectious, metabolic, progression of dementia. Will obtain labs, imaging, may require admission for further evaluation/management.

## 2023-01-22 NOTE — PATIENT PROFILE ADULT - PRO INTERPRETER NEED 2
Spoke to mom to get an update on pt's status. Mom stated that pt had one bottle before going to sleep last night and he woke up with a dry diaper.  Pt had about 32 oz yesterday and is only having about 3 wet diapers a day instead of his usual 6; still drinking roughly the same amt as he normally does. Mom said he's not having any other symptoms. Pt's last fever was on 6/22, urine cx came back negative. Advised mom to get a bmp done when he gets out of school. Will follow up with results. If pt becomes symptomatic including fevers, further decrease in UO or PO, irritability to give our office a call or go to the ED if I'm out off the office. Mom verbalized understanding.  
English

## 2023-01-22 NOTE — ED PROVIDER NOTE - PHYSICAL EXAMINATION
CONSTITUTIONAL: non-toxic, well appearing  SKIN: no rash, no petechiae.  EYES: PERRL, EOMI, pink conjunctiva, anicteric  ENT: tongue and uvular midline, no exudates, moist mucous membranes  NECK: Supple; no meningismus, no JVD  CARD: RRR, no murmurs, equal radial pulses bilaterally 2+  RESP: CTAB, no respiratory distress  ABD: Soft, non-tender, non-distended, no peritoneal signs  EXT: Normal ROM x4. No edema. No calf tenderness  NEURO: Alert, oriented to person only. Neuro exam nonfocal.  PSYCH: Cooperative, appropriate.

## 2023-01-22 NOTE — CONSULT NOTE ADULT - ASSESSMENT
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

## 2023-01-22 NOTE — ED ADULT NURSE REASSESSMENT NOTE - NS ED NURSE REASSESS COMMENT FT1
Pt resting in bed, A&Ox2 with disorientation as per baseline, admitted to medicine service, awaiting floor bed. No acute distress noted, denies chest pain, no SOB noted. Abrasion noted to right elbow, clean.

## 2023-01-23 DIAGNOSIS — E43 UNSPECIFIED SEVERE PROTEIN-CALORIE MALNUTRITION: ICD-10-CM

## 2023-01-23 PROCEDURE — 99232 SBSQ HOSP IP/OBS MODERATE 35: CPT

## 2023-01-23 RX ORDER — OLANZAPINE 15 MG/1
2.5 TABLET, FILM COATED ORAL ONCE
Refills: 0 | Status: DISCONTINUED | OUTPATIENT
Start: 2023-01-23 | End: 2023-01-23

## 2023-01-23 RX ORDER — SODIUM CHLORIDE 9 MG/ML
1000 INJECTION, SOLUTION INTRAVENOUS
Refills: 0 | Status: DISCONTINUED | OUTPATIENT
Start: 2023-01-23 | End: 2023-01-24

## 2023-01-23 RX ORDER — OLANZAPINE 15 MG/1
2.5 TABLET, FILM COATED ORAL ONCE
Refills: 0 | Status: COMPLETED | OUTPATIENT
Start: 2023-01-23 | End: 2023-01-23

## 2023-01-23 RX ADMIN — AMLODIPINE BESYLATE 5 MILLIGRAM(S): 2.5 TABLET ORAL at 06:18

## 2023-01-23 RX ADMIN — ISOSORBIDE DINITRATE 20 MILLIGRAM(S): 5 TABLET ORAL at 16:39

## 2023-01-23 RX ADMIN — ISOSORBIDE DINITRATE 20 MILLIGRAM(S): 5 TABLET ORAL at 06:18

## 2023-01-23 RX ADMIN — Medication 25 MILLIGRAM(S): at 06:18

## 2023-01-23 RX ADMIN — Medication 20 MILLIGRAM(S): at 06:19

## 2023-01-23 RX ADMIN — Medication 10 MILLIGRAM(S): at 14:30

## 2023-01-23 RX ADMIN — APIXABAN 2.5 MILLIGRAM(S): 2.5 TABLET, FILM COATED ORAL at 06:18

## 2023-01-23 NOTE — PROGRESS NOTE ADULT - SUBJECTIVE AND OBJECTIVE BOX
PULMONARY  progress note    ROSARIO RUIZ  MRN-[id number]    Patient is a 89y old  Male who presents with a chief complaint of AMS (2023 08:01)  Confused, non communicative, agitated at times as per RN      MEDICATIONS  (STANDING):  amLODIPine   Tablet 5 milliGRAM(s) Oral daily  apixaban 2.5 milliGRAM(s) Oral every 12 hours  atorvastatin 40 milliGRAM(s) Oral at bedtime  dextrose 5% + sodium chloride 0.45%. 1000 milliLiter(s) (60 mL/Hr) IV Continuous <Continuous>  furosemide    Tablet 20 milliGRAM(s) Oral daily  isosorbide   dinitrate Tablet (ISORDIL) 20 milliGRAM(s) Oral two times a day  metoprolol succinate ER 25 milliGRAM(s) Oral daily  PARoxetine 10 milliGRAM(s) Oral daily      MEDICATIONS  (PRN):  acetaminophen     Tablet .. 650 milliGRAM(s) Oral every 6 hours PRN Moderate Pain (4 - 6)      Allergies    No Known Allergies    Intolerances        PAST MEDICAL & SURGICAL HISTORY:  Accelerated Hypertension      Coronary artery disease      H/O: hypertension      History of atrial fibrillation      S/P CABG (coronary artery bypass graft)      History of coronary artery bypass graft               REVIEW OF SYSTEMS: as per staff  CONSTITUTIONAL: No fever, weight loss, or fatigue   EYES: No eye pain, visual disturbances, or discharge  ENT: Hearing aides, no tinnitus, vertigo; No sinus or throat pain  NECK: No pain or stiffness or nodes  RESPIRATORY:  cough--   wheezing --  chills--   hemoptysis--  Shortness of Breath--  CARDIOVASCULAR: No chest pain, palpitations, passing out, dizziness, or leg swelling  GASTROINTESTINAL: No abdominal or epigastric pain. No nausea, vomiting, or hematemesis;  GENITOURINARY: No dysuria, frequency, hematuria, or incontinence  NEUROLOGICAL: No headaches,  but memory loss++, no loss of strength, numbness, or tremors  SKIN: No itching, burning, rashes, or lesions   LYMPH Nodes: No enlarged glands  ENDOCRINE: No heat or cold intolerance; No hair loss        Vital Signs Last 24 Hrs  T(C): 36.3 (2023 05:32), Max: 37.1 (2023 20:38)  T(F): 97.3 (2023 05:32), Max: 98.7 (2023 20:38)  HR: 77 (2023 05:32) (74 - 101)  BP: 143/66 (2023 05:32) (139/66 - 150/90)  BP(mean): --  RR: 20 (2023 07:00) (17 - 20)  SpO2: 94% (2023 07:00) (89% - 95%)    Parameters below as of 2023 07:00  Patient On (Oxygen Delivery Method): nasal cannula  O2 Flow (L/min): 2    I&O's Detail      PHYSICAL EXAMINATION:    GENERAL: The patient is an elderly w/m in no apparent distress.   SKIN no rash ecchymoses or bruises  HEENT: Head is normocephalic and atraumatic  BENJAMIN , Extraocular muscles are intact. Mucous membranes  moist.   Neck supple ,No LN felt JVP not increased  Thyroid not enlarged  Cardiovascular:  S1 S2 heard, AF  No JVD , systolic  murmur at apex, No gallop or rub  Respiratory: Chest wall symmetrical with good air entry ,Percussion note normal,    Lungs vesicular breathing with  no  rales or   wheeze	  ABDOMEN:  Soft, Non-tender,  no hepatomegaly or splenomegaly BS positive	  Extremities: Normal range of motion, No clubbing, cyanosis or edema  Vascular: Peripheral pulses palpable 2+ bilaterally  CNS: awake , confused and not oriented  dtr 2+  Babinski neg        LABS:                        8.9    7.30  )-----------( 170      ( 2023 10:50 )             27.4         143  |  112<H>  |  26<H>  ----------------------------<  124<H>  3.7   |  21<L>  |  1.37<H>    Ca    8.6      2023 10:50  Phos  3.0       Mg     1.3         TPro  6.3  /  Alb  3.3<L>  /  TBili  0.6  /  DBili  x   /  AST  45<H>  /  ALT  52  /  AlkPhos  105      PT/INR - ( 2023 00:59 )   PT: 18.7 sec;   INR: 1.56 ratio         PTT - ( 2023 00:59 )  PTT:37.9 sec  Urinalysis Basic - ( 2023 05:53 )    Color: Yellow / Appearance: Clear / S.015 / pH: x  Gluc: x / Ketone: Negative  / Bili: Negative / Urobili: Negative   Blood: x / Protein: 30 mg/dL / Nitrite: Negative   Leuk Esterase: Negative / RBC: Negative /HPF / WBC 0-2 /HPF   Sq Epi: x / Non Sq Epi: Few /HPF / Bacteria: Few /HPF        EKG; AF, RBBB with sec St-T changes    CXR: Sternotomy sutures, Cardiomegaly    ECHO: < from: Transthoracic Echocardiogram (23 @ 07:20) >  1. Mitral annular calcification. Mild mitral regurgitation.    2. Calcified aortic valve with normal opening. Mild aortic  regurgitation.  3. Mild left atrial enlargement.  4. Increased relative wall thickness withnormal left  ventricular (LV) mass index, consistent with concentric LV  remodeling.  5. Endocardium not well visualized; grossly normal left  ventricular systolic function.  6. Normal right ventricular size and function.  7. RV systolic pressure is 81 mm Hg. Severe pulmonary  hypertension.  8. There is moderate tricuspid regurgitation.  9. EF :  55%

## 2023-01-23 NOTE — PROGRESS NOTE ADULT - PROBLEM SELECTOR PLAN 2
Refuses food forcefully  Pt. needs IVF however pulled IV out and becomes combative with attempts to replace.  Will likely need mile sedation  Will start IVF hydration when IV placed

## 2023-01-23 NOTE — PROGRESS NOTE ADULT - SUBJECTIVE AND OBJECTIVE BOX
NP Note discussed with  Primary Attending    Patient is a 89y old  Male who presents with a chief complaint of     INTERVAL HPI/OVERNIGHT EVENTS: no new complaints    MEDICATIONS  (STANDING):  amLODIPine   Tablet 5 milliGRAM(s) Oral daily  apixaban 2.5 milliGRAM(s) Oral every 12 hours  atorvastatin 40 milliGRAM(s) Oral at bedtime  furosemide    Tablet 20 milliGRAM(s) Oral daily  isosorbide   dinitrate Tablet (ISORDIL) 20 milliGRAM(s) Oral two times a day  metoprolol succinate ER 25 milliGRAM(s) Oral daily  PARoxetine 10 milliGRAM(s) Oral daily    MEDICATIONS  (PRN):  acetaminophen     Tablet .. 650 milliGRAM(s) Oral every 6 hours PRN Moderate Pain (4 - 6)      __________________________________________________  REVIEW OF SYSTEMS:    CONSTITUTIONAL: No fever,   EYES: no acute visual disturbances  NECK: No pain or stiffness  RESPIRATORY: No cough; No shortness of breath  CARDIOVASCULAR: No chest pain, no palpitations  GASTROINTESTINAL: No pain. No nausea or vomiting; No diarrhea   NEUROLOGICAL: No headache or numbness, no tremors  MUSCULOSKELETAL: No joint pain, no muscle pain  GENITOURINARY: no dysuria, no frequency, no hesitancy  PSYCHIATRY: no depression , no anxiety  ALL OTHER  ROS negative        Vital Signs Last 24 Hrs  T(C): 36.3 (2023 05:32), Max: 37.1 (2023 20:38)  T(F): 97.3 (2023 05:32), Max: 98.7 (2023 20:38)  HR: 77 (2023 05:32) (74 - 101)  BP: 143/66 (2023 05:32) (139/66 - 154/63)  BP(mean): --  RR: 20 (2023 07:00) (17 - 20)  SpO2: 94% (2023 07:00) (89% - 95%)    Parameters below as of 2023 07:00  Patient On (Oxygen Delivery Method): nasal cannula  O2 Flow (L/min): 2      ________________________________________________  PHYSICAL EXAM:  GENERAL: NAD  HEENT: Normocephalic;  conjunctivae and sclerae clear; moist mucous membranes;   NECK : supple  CHEST/LUNG: Clear to auscultation bilaterally with good air entry   HEART: S1 S2  regular; no murmurs, gallops or rubs  ABDOMEN: Soft, Nontender, Nondistended; Bowel sounds present  EXTREMITIES: no cyanosis; no edema; no calf tenderness  SKIN: warm and dry; no rash  NERVOUS SYSTEM:  Awake and alert; Oriented  to place, person and time ; no new deficits    _________________________________________________  LABS:                        8.9    7.30  )-----------( 170      ( 2023 10:50 )             27.4         143  |  112<H>  |  26<H>  ----------------------------<  124<H>  3.7   |  21<L>  |  1.37<H>    Ca    8.6      2023 10:50  Phos  3.0       Mg     1.3         TPro  6.3  /  Alb  3.3<L>  /  TBili  0.6  /  DBili  x   /  AST  45<H>  /  ALT  52  /  AlkPhos  105      PT/INR - ( 2023 00:59 )   PT: 18.7 sec;   INR: 1.56 ratio         PTT - ( 2023 00:59 )  PTT:37.9 sec  Urinalysis Basic - ( 2023 05:53 )    Color: Yellow / Appearance: Clear / S.015 / pH: x  Gluc: x / Ketone: Negative  / Bili: Negative / Urobili: Negative   Blood: x / Protein: 30 mg/dL / Nitrite: Negative   Leuk Esterase: Negative / RBC: Negative /HPF / WBC 0-2 /HPF   Sq Epi: x / Non Sq Epi: Few /HPF / Bacteria: Few /HPF      CAPILLARY BLOOD GLUCOSE    RADIOLOGY & ADDITIONAL TESTS:  < from: CT Head No Cont (23 @ 01:40) >    ACC: [id number] EXAM:  CT BRAIN   ORDERED BY: EMILIO BENITEZ     PROCEDURE DATE:  2023          INTERPRETATION:  CLINICAL INFORMATION:  AMS  TECHNIQUE:  Axial CT images were acquired through the head.  Intravenous contrast: None  Two-dimensional reformats were generated.    COMPARISON STUDY: CT brain 1/3/2023.    FINDINGS: Chronic right cerebellar infarcts again seen. Chronic infarct   of the left posterior frontal/left parietal lobe. Ventricles and cortical   sulci prominent, consistent with mild diffuse cerebral volume loss. There   is chronic periventricular white matter small vessel ischemic disease   again seen. No acute intracranial bleed, extra-axial fluid collection,   mass effect, midline shift, hydrocephalus, acute territorial infarct or   depressed skull fracture evident. There is calcific atherosclerosis of   bilateral cavernous ICAs. The imaged orbits demonstrate thin right ocular   lens and bilateral scleral calcifications. The imaged paranasal sinuses   demonstrate opacification of the left posterior ethmoid air cell. Imaged   mastoid air cells are clear.    IMPRESSION: No intracranial bleed, mass effect or acute territorial   infarct. No significant interval change.      --- End of Report ---    < end of copied text >    < from: Xray Chest 1 View- PORTABLE-Urgent (Xray Chest 1 View- PORTABLE-Urgent .) (23 @ 01:34) >    ACC: [id number] EXAM:  XR CHEST PORTABLE URGENT 1V   ORDERED BY: EMILIO BENITEZ     PROCEDURE DATE:  2023          INTERPRETATION:  HISTORY: ;  AMS r/o infiltrate;  TECHNIQUE: Portable frontal view of the chest, 1 view.  COMPARISON: 1/3/2023.  FINDINGS/  IMPRESSION:    HEART:  Enlarged  LUNGS: free of consolidation,effusion, or pneumothorax.  BONES: sternotomy wires    < end of copied text >    Imaging Personally Reviewed:  YES/NO    Consultant(s) Notes Reviewed:   YES/ No    Care Discussed with Consultants :     Plan of care was discussed with patient and /or primary care giver; all questions and concerns were addressed and care was aligned with patient's wishes.     NP Note discussed with  Primary Attending    Patient is a 89y old  Male who presents with a chief complaint of     INTERVAL HPI/OVERNIGHT EVENTS: no new complaints    MEDICATIONS  (STANDING):  amLODIPine   Tablet 5 milliGRAM(s) Oral daily  apixaban 2.5 milliGRAM(s) Oral every 12 hours  atorvastatin 40 milliGRAM(s) Oral at bedtime  furosemide    Tablet 20 milliGRAM(s) Oral daily  isosorbide   dinitrate Tablet (ISORDIL) 20 milliGRAM(s) Oral two times a day  metoprolol succinate ER 25 milliGRAM(s) Oral daily  PARoxetine 10 milliGRAM(s) Oral daily    MEDICATIONS  (PRN):  acetaminophen     Tablet .. 650 milliGRAM(s) Oral every 6 hours PRN Moderate Pain (4 - 6)      __________________________________________________  REVIEW OF SYSTEMS:  Unable to obtain due to poor mentation    Vital Signs Last 24 Hrs  T(C): 36.3 (2023 05:32), Max: 37.1 (2023 20:38)  T(F): 97.3 (2023 05:32), Max: 98.7 (2023 20:38)  HR: 77 (2023 05:32) (74 - 101)  BP: 143/66 (2023 05:32) (139/66 - 154/63)  BP(mean): --  RR: 20 (2023 07:00) (17 - 20)  SpO2: 94% (2023 07:00) (89% - 95%)    Parameters below as of 2023 07:00  Patient On (Oxygen Delivery Method): nasal cannula  O2 Flow (L/min): 2      ________________________________________________  PHYSICAL EXAM:  On and off agitated yelling "help me! help me!"  GENERAL: NAD  HEENT: Normocephalic;  conjunctivae and sclerae clear; moist mucous membranes;   NECK : supple  CHEST/LUNG: Clear to auscultation bilaterally with good air entry   HEART: S1 S2  regular; no murmurs, gallops or rubs  ABDOMEN: Soft, Nontender, Nondistended; Bowel sounds present  EXTREMITIES: no cyanosis; no edema; no calf tenderness  SKIN: warm and dry; no rash  NERVOUS SYSTEM:  On and off lethargic with periods of agitation    _________________________________________________  LABS:                        8.9    7.30  )-----------( 170      ( 2023 10:50 )             27.4         143  |  112<H>  |  26<H>  ----------------------------<  124<H>  3.7   |  21<L>  |  1.37<H>    Ca    8.6      2023 10:50  Phos  3.0       Mg     1.3         TPro  6.3  /  Alb  3.3<L>  /  TBili  0.6  /  DBili  x   /  AST  45<H>  /  ALT  52  /  AlkPhos  105      PT/INR - ( 2023 00:59 )   PT: 18.7 sec;   INR: 1.56 ratio         PTT - ( 2023 00:59 )  PTT:37.9 sec  Urinalysis Basic - ( 2023 05:53 )    Color: Yellow / Appearance: Clear / S.015 / pH: x  Gluc: x / Ketone: Negative  / Bili: Negative / Urobili: Negative   Blood: x / Protein: 30 mg/dL / Nitrite: Negative   Leuk Esterase: Negative / RBC: Negative /HPF / WBC 0-2 /HPF   Sq Epi: x / Non Sq Epi: Few /HPF / Bacteria: Few /HPF      CAPILLARY BLOOD GLUCOSE    RADIOLOGY & ADDITIONAL TESTS:  < from: CT Head No Cont (23 @ 01:40) >    ACC: [id number] EXAM:  CT BRAIN   ORDERED BY: EMILIO BENITEZ     PROCEDURE DATE:  2023          INTERPRETATION:  CLINICAL INFORMATION:  AMS  TECHNIQUE:  Axial CT images were acquired through the head.  Intravenous contrast: None  Two-dimensional reformats were generated.    COMPARISON STUDY: CT brain 1/3/2023.    FINDINGS: Chronic right cerebellar infarcts again seen. Chronic infarct   of the left posterior frontal/left parietal lobe. Ventricles and cortical   sulci prominent, consistent with mild diffuse cerebral volume loss. There   is chronic periventricular white matter small vessel ischemic disease   again seen. No acute intracranial bleed, extra-axial fluid collection,   mass effect, midline shift, hydrocephalus, acute territorial infarct or   depressed skull fracture evident. There is calcific atherosclerosis of   bilateral cavernous ICAs. The imaged orbits demonstrate thin right ocular   lens and bilateral scleral calcifications. The imaged paranasal sinuses   demonstrate opacification of the left posterior ethmoid air cell. Imaged   mastoid air cells are clear.    IMPRESSION: No intracranial bleed, mass effect or acute territorial   infarct. No significant interval change.      --- End of Report ---    < end of copied text >    < from: Xray Chest 1 View- PORTABLE-Urgent (Xray Chest 1 View- PORTABLE-Urgent .) (23 @ 01:34) >    ACC: [id number] EXAM:  XR CHEST PORTABLE URGENT 1V   ORDERED BY: EMILIO BENITEZ     PROCEDURE DATE:  2023          INTERPRETATION:  HISTORY: ;  AMS r/o infiltrate;  TECHNIQUE: Portable frontal view of the chest, 1 view.  COMPARISON: 1/3/2023.  FINDINGS/  IMPRESSION:    HEART:  Enlarged  LUNGS: free of consolidation,effusion, or pneumothorax.  BONES: sternotomy wires    < end of copied text >    Imaging Personally Reviewed:  YES/NO    Consultant(s) Notes Reviewed:   YES/ No    Care Discussed with Consultants :     Plan of care was discussed with patient and /or primary care giver; all questions and concerns were addressed and care was aligned with patient's wishes.     NP Note discussed with  Primary Attending    Patient is a 89y old  Male who presents with a chief complaint of AMS with agitation.  On enhanced supervision for periods of agitation.      INTERVAL HPI/OVERNIGHT EVENTS: no new complaints    MEDICATIONS  (STANDING):  amLODIPine   Tablet 5 milliGRAM(s) Oral daily  apixaban 2.5 milliGRAM(s) Oral every 12 hours  atorvastatin 40 milliGRAM(s) Oral at bedtime  furosemide    Tablet 20 milliGRAM(s) Oral daily  isosorbide   dinitrate Tablet (ISORDIL) 20 milliGRAM(s) Oral two times a day  metoprolol succinate ER 25 milliGRAM(s) Oral daily  PARoxetine 10 milliGRAM(s) Oral daily    MEDICATIONS  (PRN):  acetaminophen     Tablet .. 650 milliGRAM(s) Oral every 6 hours PRN Moderate Pain (4 - 6)      __________________________________________________  REVIEW OF SYSTEMS:  Unable to obtain due to poor mentation    Vital Signs Last 24 Hrs  T(C): 36.3 (2023 05:32), Max: 37.1 (2023 20:38)  T(F): 97.3 (2023 05:32), Max: 98.7 (2023 20:38)  HR: 77 (2023 05:32) (74 - 101)  BP: 143/66 (2023 05:32) (139/66 - 154/63)  BP(mean): --  RR: 20 (2023 07:00) (17 - 20)  SpO2: 94% (2023 07:00) (89% - 95%)    Parameters below as of 2023 07:00  Patient On (Oxygen Delivery Method): nasal cannula  O2 Flow (L/min): 2      ________________________________________________  PHYSICAL EXAM:  On and off agitated yelling "help me! help me!"  GENERAL: NAD  HEENT: Normocephalic;  conjunctivae and sclerae clear; moist mucous membranes;   NECK : supple  CHEST/LUNG: Clear to auscultation bilaterally with good air entry   HEART: S1 S2  regular; no murmurs, gallops or rubs  ABDOMEN: Soft, Nontender, Nondistended; Bowel sounds present  EXTREMITIES: no cyanosis; no edema; no calf tenderness  SKIN: warm and dry; no rash  NERVOUS SYSTEM:  On and off lethargic with periods of agitation    _________________________________________________  LABS:                        8.9    7.30  )-----------( 170      ( 2023 10:50 )             27.4         143  |  112<H>  |  26<H>  ----------------------------<  124<H>  3.7   |  21<L>  |  1.37<H>    Ca    8.6      2023 10:50  Phos  3.0       Mg     1.3         TPro  6.3  /  Alb  3.3<L>  /  TBili  0.6  /  DBili  x   /  AST  45<H>  /  ALT  52  /  AlkPhos  105      PT/INR - ( 2023 00:59 )   PT: 18.7 sec;   INR: 1.56 ratio         PTT - ( 2023 00:59 )  PTT:37.9 sec  Urinalysis Basic - ( 2023 05:53 )    Color: Yellow / Appearance: Clear / S.015 / pH: x  Gluc: x / Ketone: Negative  / Bili: Negative / Urobili: Negative   Blood: x / Protein: 30 mg/dL / Nitrite: Negative   Leuk Esterase: Negative / RBC: Negative /HPF / WBC 0-2 /HPF   Sq Epi: x / Non Sq Epi: Few /HPF / Bacteria: Few /HPF      CAPILLARY BLOOD GLUCOSE    RADIOLOGY & ADDITIONAL TESTS:  < from: CT Head No Cont (23 @ 01:40) >    ACC: [id number] EXAM:  CT BRAIN   ORDERED BY: EMILIO BENITEZ     PROCEDURE DATE:  2023          INTERPRETATION:  CLINICAL INFORMATION:  AMS  TECHNIQUE:  Axial CT images were acquired through the head.  Intravenous contrast: None  Two-dimensional reformats were generated.    COMPARISON STUDY: CT brain 1/3/2023.    FINDINGS: Chronic right cerebellar infarcts again seen. Chronic infarct   of the left posterior frontal/left parietal lobe. Ventricles and cortical   sulci prominent, consistent with mild diffuse cerebral volume loss. There   is chronic periventricular white matter small vessel ischemic disease   again seen. No acute intracranial bleed, extra-axial fluid collection,   mass effect, midline shift, hydrocephalus, acute territorial infarct or   depressed skull fracture evident. There is calcific atherosclerosis of   bilateral cavernous ICAs. The imaged orbits demonstrate thin right ocular   lens and bilateral scleral calcifications. The imaged paranasal sinuses   demonstrate opacification of the left posterior ethmoid air cell. Imaged   mastoid air cells are clear.    IMPRESSION: No intracranial bleed, mass effect or acute territorial   infarct. No significant interval change.      --- End of Report ---    < end of copied text >    < from: Xray Chest 1 View- PORTABLE-Urgent (Xray Chest 1 View- PORTABLE-Urgent .) (23 @ 01:34) >    ACC: [id number] EXAM:  XR CHEST PORTABLE URGENT 1V   ORDERED BY: EMILIO BENITEZ     PROCEDURE DATE:  2023          INTERPRETATION:  HISTORY: ;  AMS r/o infiltrate;  TECHNIQUE: Portable frontal view of the chest, 1 view.  COMPARISON: 1/3/2023.  FINDINGS/  IMPRESSION:    HEART:  Enlarged  LUNGS: free of consolidation,effusion, or pneumothorax.  BONES: sternotomy wires    < end of copied text >    Imaging Personally Reviewed:  YES/NO    Consultant(s) Notes Reviewed:   YES/ No    Care Discussed with Consultants :     Plan of care was discussed with patient and /or primary care giver; all questions and concerns were addressed and care was aligned with patient's wishes.

## 2023-01-23 NOTE — PROGRESS NOTE ADULT - SUBJECTIVE AND OBJECTIVE BOX
PATIENT SEEN AND EXAMINED ON :- 1/23/23  DATE OF SERVICE:1/23/23             Interim events noted,Labs ,Radiological studies and Cardiology tests reviewed        HOSPITAL COURSE: HPI:  89-year-old male with past medical history BPH, hyperlipidemia, GERD, CAD status post CABG, CVA, atrial fibrillation on Eliquis, hypertension presents with altered mental status worsening over the past few weeks. Patient is unable to provide any history.  Per chart review, patients  Home health aide states that patient's has been more confused over the past week with agitation, states patient attempted to leave the house today.  Denies any injury or fall.  Denies any fevers, difficulty breathing, vomiting, bloody stools, or rash.  Denies any additional complaints. Patient was last discharged on Jan 5th for HTN emergency and at that time JOE was recommended but family refused     In the ED:  Afebrile, hemodynamically stable  No leukocytosis  CTH negative  s/p 500cc bolus    (22 Jan 2023 06:27)      INTERIM EVENTS:Patient seen at bedside ,interim events noted.      PMH -reviewed admission note, no change since admission  HEART FAILURE: Acute[ ]Chronic[ ] Systolic[ ] Diastolic[ ] Combined Systolic and Diastolic[ ]  CAD[ ] CABG[ ] PCI[ ]  DEVICES[ ] PPM[ ] ICD[ ] ILR[ ]  ATRIAL FIBRILLATION[ ] Paroxysmal[ ] Permanent[ ] CHADS2-[  ]  NARINDER[ ] CKD1[ ] CKD2[ ] CKD3[ ] CKD4[ ] ESRD[ ]  COPD[ ] HTN[ ]   DM[ ] Type1[ ] Type 2[ ]   CVA[ ] Paresis[ ]    AMBULATION: Assisted[ ] Cane/walker[ ] Independent[ ]    MEDICATIONS  (STANDING):  amLODIPine   Tablet 5 milliGRAM(s) Oral daily  apixaban 2.5 milliGRAM(s) Oral every 12 hours  atorvastatin 40 milliGRAM(s) Oral at bedtime  dextrose 5% + sodium chloride 0.45%. 1000 milliLiter(s) (60 mL/Hr) IV Continuous <Continuous>  furosemide    Tablet 20 milliGRAM(s) Oral daily  isosorbide   dinitrate Tablet (ISORDIL) 20 milliGRAM(s) Oral two times a day  metoprolol succinate ER 25 milliGRAM(s) Oral daily  PARoxetine 10 milliGRAM(s) Oral daily    MEDICATIONS  (PRN):  acetaminophen     Tablet .. 650 milliGRAM(s) Oral every 6 hours PRN Moderate Pain (4 - 6)            REVIEW OF SYSTEMS:  Constitutional: [ ] fever, [ ]weight loss,  [ ]fatigue [ ]weight gain  Eyes: [ ] visual changes  Respiratory: [ ]shortness of breath;  [ ] cough, [ ]wheezing, [ ]chills, [ ]hemoptysis  Cardiovascular: [ ] chest pain, [ ]palpitations, [ ]dizziness,  [ ]leg swelling[ ]orthopnea[ ]PND  Gastrointestinal: [ ] abdominal pain, [ ]nausea, [ ]vomiting,  [ ]diarrhea [ ]Constipation [ ]Melena  Genitourinary: [ ] dysuria, [ ] hematuria [ ]Glover  Neurologic: [ ] headaches [ ] tremors[ ]weakness [ ]Paralysis Right[ ] Left[ ]  Skin: [ ] itching, [ ]burning, [ ] rashes  Endocrine: [ ] heat or cold intolerance  Musculoskeletal: [ ] joint pain or swelling; [ ] muscle, back, or extremity pain  Psychiatric: [ ] depression, [ ]anxiety, [ ]mood swings, or [ ]difficulty sleeping  Hematologic: [ ] easy bruising, [ ] bleeding gums    [ ] All remaining systems negative except as per above.   [ ]Unable to obtain.  [x] No change in ROS since admission      Vital Signs Last 24 Hrs  T(C): 36.7 (23 Jan 2023 12:49), Max: 37.1 (22 Jan 2023 20:38)  T(F): 98.1 (23 Jan 2023 12:49), Max: 98.7 (22 Jan 2023 20:38)  HR: 70 (23 Jan 2023 16:35) (70 - 77)  BP: 137/54 (23 Jan 2023 16:35) (123/78 - 143/66)  BP(mean): --  RR: 20 (23 Jan 2023 12:49) (18 - 20)  SpO2: 98% (23 Jan 2023 12:49) (89% - 98%)    Parameters below as of 23 Jan 2023 12:49  Patient On (Oxygen Delivery Method): nasal cannula  O2 Flow (L/min): 2    I&O's Summary      PHYSICAL EXAM:  General: No acute distress BMI-  HEENT: EOMI, PERRL  Neck: Supple, [ ] JVD  Lungs: Equal air entry bilaterally; [ ] rales [ ] wheezing [ ] rhonchi  Heart: Regular rate and rhythm; [x ] murmur   2/6 [ x] systolic [ ] diastolic [ ] radiation[ ] rubs [ ]  gallops  Abdomen: Nontender, bowel sounds present  Extremities: No clubbing, cyanosis, [ ] edema [ ]Pulses  equal and intact  Nervous system:  Alert & Oriented X3, no focal deficits  Psychiatric: Normal affect  Skin: No rashes or lesions    LABS:  01-22    143  |  112<H>  |  26<H>  ----------------------------<  124<H>  3.7   |  21<L>  |  1.37<H>    Ca    8.6      22 Jan 2023 10:50  Phos  3.0     01-22  Mg     1.3     01-22    TPro  6.3  /  Alb  3.3<L>  /  TBili  0.6  /  DBili  x   /  AST  45<H>  /  ALT  52  /  AlkPhos  105  01-22    Creatinine Trend: 1.37<--, 1.43<--, 1.36<--, 1.37<--, 1.24<--                        8.9    7.30  )-----------( 170      ( 22 Jan 2023 10:50 )             27.4     PT/INR - ( 22 Jan 2023 00:59 )   PT: 18.7 sec;   INR: 1.56 ratio         PTT - ( 22 Jan 2023 00:59 )  PTT:37.9 sec

## 2023-01-23 NOTE — PROGRESS NOTE ADULT - ASSESSMENT
Pulm Htn ? Etio   Cough ?asp  Dementia   CABG with old MI with AF  with comp CHF   Old CVA   HCVD with MR   Anemia-- macrocytic   DNR      Plan-- Supportive care   TSH, B12 and folate levels   Continue with present med   Eliquis, lopressor   Suction prn  Aricept 5 mg qd

## 2023-01-23 NOTE — PROGRESS NOTE ADULT - ASSESSMENT
89-year-old male with past medical history BPH, hyperlipidemia, GERD, CAD status post CABG, CVA, atrial fibrillation on Eliquis, hypertension presents with altered mental status worsening over the past few weeks. There are no signs of infection, and patient likely is progressively declining due to age. Admitted for possible placement.   CTH negative, pt. afebrile with no leukocytosis.  Pt. forcefully refuses food, gets very agitated when attempts made to feed him.  Pt. also pulled IV, unable to hydrate at this time.  Pt. awaiting possible placement, CM/SW consulted.  Spoke to pt.'s daughter Alaina at [phone], updated her re pt.'s condition.  Pt.'s daughter will discuss with family re placement vs. home with HHA.

## 2023-01-23 NOTE — PROGRESS NOTE ADULT - ASSESSMENT
89-year-old male with past medical history BPH, hyperlipidemia, GERD, CAD status post CABG, CVA, atrial fibrillation on Eliquis, hypertension presents with altered mental status worsening over the past few weeks. There are no signs of infection, and patient likely is progressively declining due to age. Admitted for possible placement.   CTH negative, pt. afebrile with no leukocytosis.  Pt. forcefully refuses food, gets very agitated when attempts made to feed him.  Pt. also pulled IV, unable to hydrate at this time.   89-year-old male with past medical history BPH, hyperlipidemia, GERD, CAD status post CABG, CVA, atrial fibrillation on Eliquis, hypertension presents with altered mental status worsening over the past few weeks. There are no signs of infection, and patient likely is progressively declining due to age. Admitted for possible placement.   CTH negative, pt. afebrile with no leukocytosis.  Pt. forcefully refuses food, gets very agitated when attempts made to feed him.  Pt. also pulled IV, unable to hydrate at this time.  Pt. awaiting possible placement, CM/SW consulted. 89-year-old male with past medical history BPH, hyperlipidemia, GERD, CAD status post CABG, CVA, atrial fibrillation on Eliquis, hypertension presents with altered mental status worsening over the past few weeks. There are no signs of infection, and patient likely is progressively declining due to age. Admitted for possible placement.   CTH negative, pt. afebrile with no leukocytosis.  Pt. forcefully refuses food, gets very agitated when attempts made to feed him.  Pt. also pulled IV, unable to hydrate at this time.  Pt. awaiting possible placement, CM/SW consulted.  Spoke to pt.'s daughter Alaina at [phone], updated her re pt.'s condition.  Pt.'s daughter will discuss with family re placement vs. home with HHA.

## 2023-01-23 NOTE — PROGRESS NOTE ADULT - PROBLEM SELECTOR PLAN 2
Refuses food forcefully Refuses food forcefully  Pt. needs IVF however pulled IV out and becomes combative with attempts to replace.  Will likely need mile sedation  Will start IVF hydration when IV placed

## 2023-01-24 PROCEDURE — 99232 SBSQ HOSP IP/OBS MODERATE 35: CPT

## 2023-01-24 RX ORDER — AMLODIPINE BESYLATE 2.5 MG/1
10 TABLET ORAL DAILY
Refills: 0 | Status: DISCONTINUED | OUTPATIENT
Start: 2023-01-24 | End: 2023-01-25

## 2023-01-24 RX ADMIN — SODIUM CHLORIDE 60 MILLILITER(S): 9 INJECTION, SOLUTION INTRAVENOUS at 06:02

## 2023-01-24 RX ADMIN — Medication 20 MILLIGRAM(S): at 06:02

## 2023-01-24 RX ADMIN — APIXABAN 2.5 MILLIGRAM(S): 2.5 TABLET, FILM COATED ORAL at 06:02

## 2023-01-24 RX ADMIN — APIXABAN 2.5 MILLIGRAM(S): 2.5 TABLET, FILM COATED ORAL at 17:39

## 2023-01-24 RX ADMIN — ISOSORBIDE DINITRATE 20 MILLIGRAM(S): 5 TABLET ORAL at 06:01

## 2023-01-24 RX ADMIN — Medication 10 MILLIGRAM(S): at 12:49

## 2023-01-24 RX ADMIN — ISOSORBIDE DINITRATE 20 MILLIGRAM(S): 5 TABLET ORAL at 17:39

## 2023-01-24 RX ADMIN — Medication 25 MILLIGRAM(S): at 06:01

## 2023-01-24 RX ADMIN — AMLODIPINE BESYLATE 5 MILLIGRAM(S): 2.5 TABLET ORAL at 06:03

## 2023-01-24 NOTE — PROGRESS NOTE ADULT - PROBLEM SELECTOR PLAN 1
presents w/ AMS, recently discharged after HTN urgency  Afebrile, hemodynamically stable  No leukocytosis  CTH negative  no sign of infection   patient AMS likely 2/2 progressive decline due to age   Social work following for possible placement in a dementia facility

## 2023-01-24 NOTE — PHYSICAL THERAPY INITIAL EVALUATION ADULT - GAIT DEVIATIONS NOTED, PT EVAL
decreased shira/increased time in double stance/decreased velocity of limb motion/decreased step length/decreased stride length/decreased weight-shifting ability

## 2023-01-24 NOTE — PHYSICAL THERAPY INITIAL EVALUATION ADULT - PHYSICAL ASSIST/NONPHYSICAL ASSIST: SIT/STAND, REHAB EVAL
verbal cues/nonverbal cues (demo/gestures)/1 person assist Suturegard Intro: Intraoperative tissue expansion was performed, utilizing the SUTUREGARD device, in order to reduce wound tension.

## 2023-01-24 NOTE — PHYSICAL THERAPY INITIAL EVALUATION ADULT - MANUAL MUSCLE TESTING RESULTS, REHAB EVAL
Limited assessment secondary to impaired ability to follow commands: Grossly at least 3+/5 throughout extremities, 2+/5 trunk flx

## 2023-01-24 NOTE — PROGRESS NOTE ADULT - PROBLEM SELECTOR PLAN 1
presents w/ AMS, recently discharged after HTN urgency  Afebrile, hemodynamically stable  No leukocytosis  CTH negative  no sign of infection   patient AMS likely 2/2 progressive decline due to age   Social work consulted for possible placement presents w/ AMS, recently discharged after HTN urgency  Afebrile, hemodynamically stable  No leukocytosis  CTH negative  no sign of infection   patient AMS likely 2/2 progressive decline due to age   Social work following for possible placement in a dementia facility

## 2023-01-24 NOTE — PHYSICAL THERAPY INITIAL EVALUATION ADULT - GENERAL OBSERVATIONS, REHAB EVAL
Consult received, chart reviewed. Patient received seated in chair,, NAD. Patient agreed to EVALUATION from Physical Therapist.

## 2023-01-24 NOTE — PROGRESS NOTE ADULT - SUBJECTIVE AND OBJECTIVE BOX
PULMONARY  progress note    ROSARIO RUIZ  MRN-[id number]    Patient is a 89y old  Male who presents with a chief complaint of AMS (24 Jan 2023 09:55)  Pt more responsive sitting in chair      MEDICATIONS  (STANDING):  amLODIPine   Tablet 5 milliGRAM(s) Oral daily  apixaban 2.5 milliGRAM(s) Oral every 12 hours  atorvastatin 40 milliGRAM(s) Oral at bedtime  dextrose 5% + sodium chloride 0.45%. 1000 milliLiter(s) (60 mL/Hr) IV Continuous <Continuous>  furosemide    Tablet 20 milliGRAM(s) Oral daily  isosorbide   dinitrate Tablet (ISORDIL) 20 milliGRAM(s) Oral two times a day  metoprolol succinate ER 25 milliGRAM(s) Oral daily  PARoxetine 10 milliGRAM(s) Oral daily          PAST MEDICAL & SURGICAL HISTORY:  Accelerated Hypertension      Coronary artery disease      H/O: hypertension      History of atrial fibrillation      S/P CABG (coronary artery bypass graft)                   Vital Signs Last 24 Hrs  T(C): 36.6 (24 Jan 2023 05:00), Max: 36.7 (23 Jan 2023 12:49)  T(F): 97.8 (24 Jan 2023 05:00), Max: 98.1 (23 Jan 2023 12:49)  HR: 89 (24 Jan 2023 05:00) (70 - 89)  BP: 149/65 (24 Jan 2023 05:00) (123/78 - 149/65)  BP(mean): --  RR: 20 (24 Jan 2023 05:00) (20 - 20)  SpO2: 95% (24 Jan 2023 05:00) (95% - 98%)    Parameters below as of 24 Jan 2023 05:00  Patient On (Oxygen Delivery Method): nasal cannula  O2 Flow (L/min): 2    I&O's Detail      PHYSICAL EXAMINATION:    GENERAL: The patient is an elderly w/m in no apparent distress.   SKIN no rash ecchymoses or bruises  HEENT: Head is normocephalic and atraumatic  BENJAMIN , Extraocular muscles are intact. Mucous membranes  moist.   Neck supple ,No LN felt JVP not increased  Thyroid not enlarged  Cardiovascular:  S1 S2 heard, AF , No JVD , systolic  murmur at apex, No gallop or rub  Respiratory: Chest wall symmetrical with good air entry ,Percussion note normal,    Lungs vesicular breathing with  no  rales or   wheeze	  ABDOMEN:  Soft, Non-tender, no hepatomegaly or splenomegaly BS positive	  Extremities:, No clubbing, cyanosis or edema  Vascular: Peripheral pulses palpable 2+ bilaterally  CNS:  Alert and  responsive,confused  sensory intact  motor power5/5  dtr 2+  Babinski neg      LABS:                        8.9    7.30  )-----------( 170      ( 22 Jan 2023 10:50 )             27.4     01-22    143  |  112<H>  |  26<H>  ----------------------------<  124<H>  3.7   |  21<L>  |  1.37<H>    Ca    8.6      22 Jan 2023 10:50  Phos  3.0     01-22  Mg     1.3     01-22    TPro  6.3  /  Alb  3.3<L>  /  TBili  0.6  /  DBili  x   /  AST  45<H>  /  ALT  52  /  AlkPhos  105  01-22      MICROBIOLOGY:    Culture - Urine (collected 01-22-23 @ 05:53)  Source: Clean Catch Clean Catch (Midstream)  Preliminary Report (01-23-23 @ 17:59):    10,000 - 49,000 CFU/mL Gram Negative Rods

## 2023-01-24 NOTE — PROGRESS NOTE ADULT - SUBJECTIVE AND OBJECTIVE BOX
NP Note discussed with  Primary Attending    Patient is a 89y old  Male who presents with a chief complaint of AMS (23 Jan 2023 08:01)      INTERVAL HPI/OVERNIGHT EVENTS: no new complaints    MEDICATIONS  (STANDING):  amLODIPine   Tablet 5 milliGRAM(s) Oral daily  apixaban 2.5 milliGRAM(s) Oral every 12 hours  atorvastatin 40 milliGRAM(s) Oral at bedtime  dextrose 5% + sodium chloride 0.45%. 1000 milliLiter(s) (60 mL/Hr) IV Continuous <Continuous>  furosemide    Tablet 20 milliGRAM(s) Oral daily  isosorbide   dinitrate Tablet (ISORDIL) 20 milliGRAM(s) Oral two times a day  metoprolol succinate ER 25 milliGRAM(s) Oral daily  PARoxetine 10 milliGRAM(s) Oral daily    MEDICATIONS  (PRN):  acetaminophen     Tablet .. 650 milliGRAM(s) Oral every 6 hours PRN Moderate Pain (4 - 6)      __________________________________________________  REVIEW OF SYSTEMS:  sitting in chair with less agitation    CONSTITUTIONAL: No fever,   EYES: no acute visual disturbances  NECK: No pain or stiffness  RESPIRATORY: No cough; No shortness of breath  CARDIOVASCULAR: No chest pain, no palpitations  GASTROINTESTINAL: No pain. No nausea or vomiting; No diarrhea   NEUROLOGICAL: No headache or numbness, no tremors  MUSCULOSKELETAL: No joint pain, no muscle pain  GENITOURINARY: no dysuria, no frequency, no hesitancy  PSYCHIATRY: no depression , no anxiety  ALL OTHER  ROS negative        Vital Signs Last 24 Hrs  T(C): 36.6 (24 Jan 2023 05:00), Max: 36.7 (23 Jan 2023 12:49)  T(F): 97.8 (24 Jan 2023 05:00), Max: 98.1 (23 Jan 2023 12:49)  HR: 89 (24 Jan 2023 05:00) (70 - 89)  BP: 149/65 (24 Jan 2023 05:00) (123/78 - 149/65)  BP(mean): --  RR: 20 (24 Jan 2023 05:00) (20 - 20)  SpO2: 95% (24 Jan 2023 05:00) (95% - 98%)    Parameters below as of 24 Jan 2023 05:00  Patient On (Oxygen Delivery Method): nasal cannula  O2 Flow (L/min): 2      ________________________________________________  PHYSICAL EXAM: appropriate for age, periods of agitation  GENERAL: NAD  HEENT: Normocephalic;  conjunctivae and sclerae clear; moist mucous membranes;   NECK : supple  CHEST/LUNG: Clear to auscultation bilaterally with good air entry   HEART: S1 S2  regular; no murmurs, gallops or rubs  ABDOMEN: Soft, Nontender, Nondistended; Bowel sounds present  EXTREMITIES: no cyanosis; no edema; no calf tenderness  SKIN: warm and dry; no rash  NERVOUS SYSTEM:  Awake and alert; Oriented  to place, person and time ; no new deficits    _________________________________________________  LABS:                        8.9    7.30  )-----------( 170      ( 22 Jan 2023 10:50 )             27.4     01-22    143  |  112<H>  |  26<H>  ----------------------------<  124<H>  3.7   |  21<L>  |  1.37<H>    Ca    8.6      22 Jan 2023 10:50  Phos  3.0     01-22  Mg     1.3     01-22    TPro  6.3  /  Alb  3.3<L>  /  TBili  0.6  /  DBili  x   /  AST  45<H>  /  ALT  52  /  AlkPhos  105  01-22        CAPILLARY BLOOD GLUCOSE      RADIOLOGY & ADDITIONAL TESTS:    < from: CT Head No Cont (01.22.23 @ 01:40) >    ACC: [id number] EXAM:  CT BRAIN   ORDERED BY: EMILIO BENITEZ     PROCEDURE DATE:  01/22/2023          INTERPRETATION:  CLINICAL INFORMATION:  AMS  TECHNIQUE:  Axial CT images were acquired through the head.  Intravenous contrast: None  Two-dimensional reformats were generated.    COMPARISON STUDY: CT brain 1/3/2023.    FINDINGS: Chronic right cerebellar infarcts again seen. Chronic infarct   of the left posterior frontal/left parietal lobe. Ventricles and cortical   sulci prominent, consistent with mild diffuse cerebral volume loss. There   is chronic periventricular white matter small vessel ischemic disease   again seen. No acute intracranial bleed, extra-axial fluid collection,   mass effect, midline shift, hydrocephalus, acute territorial infarct or   depressed skull fracture evident. There is calcific atherosclerosis of   bilateral cavernous ICAs. The imaged orbits demonstrate thin right ocular   lens and bilateral scleral calcifications. The imaged paranasal sinuses   demonstrate opacification of the left posterior ethmoid air cell. Imaged   mastoid air cells are clear.    IMPRESSION: No intracranial bleed, mass effect or acute territorial   infarct. No significant interval change.    < end of copied text >    < from: Xray Chest 1 View- PORTABLE-Urgent (Xray Chest 1 View- PORTABLE-Urgent .) (01.22.23 @ 01:34) >    ACC: [id number] EXAM:  XR CHEST PORTABLE URGENT 1V   ORDERED BY: EMILIO BENITEZ     PROCEDURE DATE:  01/22/2023          INTERPRETATION:  HISTORY: ;  AMS r/o infiltrate;  TECHNIQUE: Portable frontal view of the chest, 1 view.  COMPARISON: 1/3/2023.  FINDINGS/  IMPRESSION:    HEART:  Enlarged  LUNGS: free of consolidation,effusion, or pneumothorax.  BONES: sternotomy wires      --- End of Report ---    < end of copied text >      Imaging Personally Reviewed:  YES/NO    Consultant(s) Notes Reviewed:   YES/ No    Care Discussed with Consultants :     Plan of care was discussed with patient and /or primary care giver; all questions and concerns were addressed and care was aligned with patient's wishes.

## 2023-01-24 NOTE — PROGRESS NOTE ADULT - ASSESSMENT
89-year-old male with past medical history BPH, hyperlipidemia, GERD, CAD status post CABG, CVA, atrial fibrillation on Eliquis, hypertension presents with altered mental status worsening over the past few weeks. There are no signs of infection, and patient likely is progressively declining due to age. Admitted for possible placement.   CTH negative, pt. afebrile with no leukocytosis.  Pt. forcefully refuses food, gets very agitated when attempts made to feed him.  Pt. also pulled IV, unable to hydrate at this time.  Pt. awaiting possible placement, CM/SW consulted.  Spoke to pt.'s daughter Alaina at [phone], updated her re pt.'s condition.  Pt.'s daughter will discuss with family re placement vs. home with HHA.  1/24-Less agitated while in bed, also eating portions of his meals.

## 2023-01-24 NOTE — PROGRESS NOTE ADULT - ASSESSMENT
Pulm Htn ? Etio   Cough ?asp  Dementia   CABG with old MI with AF  with comp CHF   Old CVA   HCVD with MR   Anemia-- macrocytic   DNR      Plan-- Supportive care  TSH, B12 and folate levels  Continue with present med  Eliquis, lopressor  Suction prn  Aricept 5 mg qd  SS for placement

## 2023-01-24 NOTE — PROGRESS NOTE ADULT - SUBJECTIVE AND OBJECTIVE BOX
PATIENT SEEN AND EXAMINED ON :- 1/24/23  DATE OF SERVICE:   1/24/23          Interim events noted,Labs ,Radiological studies and Cardiology tests reviewed .       HOSPITAL COURSE: HPI:  89-year-old male with past medical history BPH, hyperlipidemia, GERD, CAD status post CABG, CVA, atrial fibrillation on Eliquis, hypertension presents with altered mental status worsening over the past few weeks. Patient is unable to provide any history.  Per chart review, patients  Home health aide states that patient's has been more confused over the past week with agitation, states patient attempted to leave the house today.  Denies any injury or fall.  Denies any fevers, difficulty breathing, vomiting, bloody stools, or rash.  Denies any additional complaints. Patient was last discharged on Jan 5th for HTN emergency and at that time JOE was recommended but family refused     In the ED:  Afebrile, hemodynamically stable  No leukocytosis  CTH negative  s/p 500cc bolus    (22 Jan 2023 06:27)      INTERIM EVENTS:Patient seen at bedside ,interim events noted.      PMH -reviewed admission note, no change since admission  HEART FAILURE: Acute[ ]Chronic[ ] Systolic[ ] Diastolic[ ] Combined Systolic and Diastolic[ ]  CAD[ ] CABG[ ] PCI[ ]  DEVICES[ ] PPM[ ] ICD[ ] ILR[ ]  ATRIAL FIBRILLATION[ ] Paroxysmal[ ] Permanent[ ] CHADS2-[  ]  NARINDER[ ] CKD1[ ] CKD2[ ] CKD3[ ] CKD4[ ] ESRD[ ]  COPD[ ] HTN[ ]   DM[ ] Type1[ ] Type 2[ ]   CVA[ ] Paresis[ ]    AMBULATION: Assisted[ ] Cane/walker[ ] Independent[ ]    MEDICATIONS  (STANDING):  amLODIPine   Tablet 10 milliGRAM(s) Oral daily  apixaban 2.5 milliGRAM(s) Oral every 12 hours  atorvastatin 40 milliGRAM(s) Oral at bedtime  furosemide    Tablet 20 milliGRAM(s) Oral daily  isosorbide   dinitrate Tablet (ISORDIL) 20 milliGRAM(s) Oral two times a day  metoprolol succinate ER 25 milliGRAM(s) Oral daily  PARoxetine 10 milliGRAM(s) Oral daily    MEDICATIONS  (PRN):  acetaminophen     Tablet .. 650 milliGRAM(s) Oral every 6 hours PRN Moderate Pain (4 - 6)            REVIEW OF SYSTEMS:  Constitutional: [ ] fever, [ ]weight loss,  [ ]fatigue [ ]weight gain  Eyes: [ ] visual changes  Respiratory: [ ]shortness of breath;  [ ] cough, [ ]wheezing, [ ]chills, [ ]hemoptysis  Cardiovascular: [ ] chest pain, [ ]palpitations, [ ]dizziness,  [ ]leg swelling[ ]orthopnea[ ]PND  Gastrointestinal: [ ] abdominal pain, [ ]nausea, [ ]vomiting,  [ ]diarrhea [ ]Constipation [ ]Melena  Genitourinary: [ ] dysuria, [ ] hematuria [ ]Glover  Neurologic: [ ] headaches [ ] tremors[ ]weakness [ ]Paralysis Right[ ] Left[ ]  Skin: [ ] itching, [ ]burning, [ ] rashes  Endocrine: [ ] heat or cold intolerance  Musculoskeletal: [ ] joint pain or swelling; [ ] muscle, back, or extremity pain  Psychiatric: [ ] depression, [ ]anxiety, [ ]mood swings, or [ ]difficulty sleeping  Hematologic: [ ] easy bruising, [ ] bleeding gums    [ ] All remaining systems negative except as per above.   [ ]Unable to obtain.  [x] No change in ROS since admission      Vital Signs Last 24 Hrs  T(C): 37.1 (24 Jan 2023 14:43), Max: 37.1 (24 Jan 2023 14:43)  T(F): 98.7 (24 Jan 2023 14:43), Max: 98.7 (24 Jan 2023 14:43)  HR: 83 (24 Jan 2023 17:40) (67 - 89)  BP: 149/63 (24 Jan 2023 17:40) (132/59 - 180/76)  BP(mean): --  RR: 18 (24 Jan 2023 14:43) (18 - 20)  SpO2: 96% (24 Jan 2023 14:43) (95% - 99%)    Parameters below as of 24 Jan 2023 14:43  Patient On (Oxygen Delivery Method): nasal cannula      I&O's Summary      PHYSICAL EXAM:  General: No acute distress BMI-  HEENT: EOMI, PERRL  Neck: Supple, [ ] JVD  Lungs: Equal air entry bilaterally; [ ] rales [ ] wheezing [ ] rhonchi  Heart: Regular rate and rhythm; [x ] murmur   2/6 [ x] systolic [ ] diastolic [ ] radiation[ ] rubs [ ]  gallops  Abdomen: Nontender, bowel sounds present  Extremities: No clubbing, cyanosis, [ ] edema [ ]Pulses  equal and intact  Nervous system:  Alert & Oriented X3, no focal deficits  Psychiatric: Normal affect  Skin: No rashes or lesions    LABS:        Creatinine Trend: 1.37<--, 1.43<--, 1.36<--, 1.37<--, 1.24<--

## 2023-01-24 NOTE — PROGRESS NOTE ADULT - ASSESSMENT
89-year-old male with past medical history BPH, hyperlipidemia, GERD, CAD status post CABG, CVA, atrial fibrillation on Eliquis, hypertension presents with altered mental status worsening over the past few weeks. There are no signs of infection, and patient likely is progressively declining due to age. Admitted for possible placement.   CTH negative, pt. afebrile with no leukocytosis.  Pt. forcefully refuses food, gets very agitated when attempts made to feed him.  Pt. also pulled IV, unable to hydrate at this time.  Pt. awaiting possible placement, CM/SW consulted.  Spoke to pt.'s daughter Alaina at [phone], updated her re pt.'s condition.  Pt.'s daughter will discuss with family re placement vs. home with HHA.  1/24-Less agitated while in bed, also eating portions of his meals.

## 2023-01-24 NOTE — PHYSICAL THERAPY INITIAL EVALUATION ADULT - COORDINATION ASSESSED, REHAB EVAL
attempted but Limited assessment secondary to impaired ability to follow commands: Mvnt was grossly w/o coordination deficits/finger to nose

## 2023-01-24 NOTE — PHYSICAL THERAPY INITIAL EVALUATION ADULT - RISK REDUCTION/PREVENTION, PT EVAL
risk factors/recurrence of condition/secondary impairments Patient/Caregiver provided printed discharge information.

## 2023-01-24 NOTE — PHYSICAL THERAPY INITIAL EVALUATION ADULT - ADDITIONAL COMMENTS
Pt lesley limited historian, but per chart:  "Patient lives with his 93 y.o. wife in an apartment in elevator building. He had CDPAP aide for 35hrs/7days"

## 2023-01-24 NOTE — PHYSICAL THERAPY INITIAL EVALUATION ADULT - DIAGNOSIS, PT EVAL
(ICF Model) Pt. present w/deficits in Body Structures/Function (Impairments), incl: Strength, Balance, cognition, leading to deficits in performing the below noted Activities (Limitations).

## 2023-01-24 NOTE — GOALS OF CARE CONVERSATION - ADVANCED CARE PLANNING - CONVERSATION DETAILS
Pt. is 89 y/o with PMH CAD s/p CABG, CVA, AF, HTN, HLD, GERD, BPH who p/w AMS and increased agitation with family requesting LTC placement.  Discussed GOC with pt.'s HCP,  Pt. is DNR/DNI, molst form updated.  HCP is aware that pt. acts out at times, would like to minimize sedative agents as much as possible.  HCP collaborating with SW for placement in a LTC facility.

## 2023-01-25 ENCOUNTER — TRANSCRIPTION ENCOUNTER (OUTPATIENT)
Age: 89
End: 2023-01-25

## 2023-01-25 VITALS
HEART RATE: 75 BPM | RESPIRATION RATE: 18 BRPM | OXYGEN SATURATION: 96 % | DIASTOLIC BLOOD PRESSURE: 49 MMHG | SYSTOLIC BLOOD PRESSURE: 148 MMHG | TEMPERATURE: 98 F

## 2023-01-25 LAB
-  AMIKACIN: SIGNIFICANT CHANGE UP
-  AMOXICILLIN/CLAVULANIC ACID: SIGNIFICANT CHANGE UP
-  AMPICILLIN/SULBACTAM: SIGNIFICANT CHANGE UP
-  AMPICILLIN: SIGNIFICANT CHANGE UP
-  AZTREONAM: SIGNIFICANT CHANGE UP
-  CEFAZOLIN: SIGNIFICANT CHANGE UP
-  CEFEPIME: SIGNIFICANT CHANGE UP
-  CEFOXITIN: SIGNIFICANT CHANGE UP
-  CEFTRIAXONE: SIGNIFICANT CHANGE UP
-  CEFUROXIME: SIGNIFICANT CHANGE UP
-  CIPROFLOXACIN: SIGNIFICANT CHANGE UP
-  ERTAPENEM: SIGNIFICANT CHANGE UP
-  GENTAMICIN: SIGNIFICANT CHANGE UP
-  LEVOFLOXACIN: SIGNIFICANT CHANGE UP
-  MEROPENEM: SIGNIFICANT CHANGE UP
-  NITROFURANTOIN: SIGNIFICANT CHANGE UP
-  PIPERACILLIN/TAZOBACTAM: SIGNIFICANT CHANGE UP
-  TOBRAMYCIN: SIGNIFICANT CHANGE UP
-  TRIMETHOPRIM/SULFAMETHOXAZOLE: SIGNIFICANT CHANGE UP
ANION GAP SERPL CALC-SCNC: 11 MMOL/L — SIGNIFICANT CHANGE UP (ref 5–17)
BUN SERPL-MCNC: 14 MG/DL — SIGNIFICANT CHANGE UP (ref 7–18)
CALCIUM SERPL-MCNC: 8.6 MG/DL — SIGNIFICANT CHANGE UP (ref 8.4–10.5)
CHLORIDE SERPL-SCNC: 108 MMOL/L — SIGNIFICANT CHANGE UP (ref 96–108)
CO2 SERPL-SCNC: 21 MMOL/L — LOW (ref 22–31)
CREAT SERPL-MCNC: 1.36 MG/DL — HIGH (ref 0.5–1.3)
CULTURE RESULTS: SIGNIFICANT CHANGE UP
EGFR: 46 ML/MIN/1.73M2 — LOW
GLUCOSE SERPL-MCNC: 106 MG/DL — HIGH (ref 70–99)
HCT VFR BLD CALC: 27.3 % — LOW (ref 39–50)
HGB BLD-MCNC: 8.9 G/DL — LOW (ref 13–17)
MCHC RBC-ENTMCNC: 32.6 GM/DL — SIGNIFICANT CHANGE UP (ref 32–36)
MCHC RBC-ENTMCNC: 32.7 PG — SIGNIFICANT CHANGE UP (ref 27–34)
MCV RBC AUTO: 100.4 FL — HIGH (ref 80–100)
METHOD TYPE: SIGNIFICANT CHANGE UP
NRBC # BLD: 0 /100 WBCS — SIGNIFICANT CHANGE UP (ref 0–0)
ORGANISM # SPEC MICROSCOPIC CNT: SIGNIFICANT CHANGE UP
ORGANISM # SPEC MICROSCOPIC CNT: SIGNIFICANT CHANGE UP
PLATELET # BLD AUTO: 164 K/UL — SIGNIFICANT CHANGE UP (ref 150–400)
POTASSIUM SERPL-MCNC: 3.7 MMOL/L — SIGNIFICANT CHANGE UP (ref 3.5–5.3)
POTASSIUM SERPL-SCNC: 3.7 MMOL/L — SIGNIFICANT CHANGE UP (ref 3.5–5.3)
RBC # BLD: 2.72 M/UL — LOW (ref 4.2–5.8)
RBC # FLD: 13.1 % — SIGNIFICANT CHANGE UP (ref 10.3–14.5)
SARS-COV-2 RNA SPEC QL NAA+PROBE: SIGNIFICANT CHANGE UP
SODIUM SERPL-SCNC: 140 MMOL/L — SIGNIFICANT CHANGE UP (ref 135–145)
SPECIMEN SOURCE: SIGNIFICANT CHANGE UP
WBC # BLD: 9.4 K/UL — SIGNIFICANT CHANGE UP (ref 3.8–10.5)
WBC # FLD AUTO: 9.4 K/UL — SIGNIFICANT CHANGE UP (ref 3.8–10.5)

## 2023-01-25 PROCEDURE — 99285 EMERGENCY DEPT VISIT HI MDM: CPT

## 2023-01-25 PROCEDURE — 71045 X-RAY EXAM CHEST 1 VIEW: CPT

## 2023-01-25 PROCEDURE — 80053 COMPREHEN METABOLIC PANEL: CPT

## 2023-01-25 PROCEDURE — 83735 ASSAY OF MAGNESIUM: CPT

## 2023-01-25 PROCEDURE — 80048 BASIC METABOLIC PNL TOTAL CA: CPT

## 2023-01-25 PROCEDURE — 70450 CT HEAD/BRAIN W/O DYE: CPT | Mod: MA

## 2023-01-25 PROCEDURE — 85027 COMPLETE CBC AUTOMATED: CPT

## 2023-01-25 PROCEDURE — 85025 COMPLETE CBC W/AUTO DIFF WBC: CPT

## 2023-01-25 PROCEDURE — 81001 URINALYSIS AUTO W/SCOPE: CPT

## 2023-01-25 PROCEDURE — 87077 CULTURE AEROBIC IDENTIFY: CPT

## 2023-01-25 PROCEDURE — 82962 GLUCOSE BLOOD TEST: CPT

## 2023-01-25 PROCEDURE — 83605 ASSAY OF LACTIC ACID: CPT

## 2023-01-25 PROCEDURE — 93005 ELECTROCARDIOGRAM TRACING: CPT

## 2023-01-25 PROCEDURE — 97162 PT EVAL MOD COMPLEX 30 MIN: CPT

## 2023-01-25 PROCEDURE — 85730 THROMBOPLASTIN TIME PARTIAL: CPT

## 2023-01-25 PROCEDURE — 87637 SARSCOV2&INF A&B&RSV AMP PRB: CPT

## 2023-01-25 PROCEDURE — 87186 SC STD MICRODIL/AGAR DIL: CPT

## 2023-01-25 PROCEDURE — 85610 PROTHROMBIN TIME: CPT

## 2023-01-25 PROCEDURE — 87635 SARS-COV-2 COVID-19 AMP PRB: CPT

## 2023-01-25 PROCEDURE — 36415 COLL VENOUS BLD VENIPUNCTURE: CPT

## 2023-01-25 PROCEDURE — 87086 URINE CULTURE/COLONY COUNT: CPT

## 2023-01-25 PROCEDURE — 84100 ASSAY OF PHOSPHORUS: CPT

## 2023-01-25 RX ORDER — AMLODIPINE BESYLATE 2.5 MG/1
1 TABLET ORAL
Qty: 0 | Refills: 0 | DISCHARGE
Start: 2023-01-25

## 2023-01-25 RX ORDER — ACETAMINOPHEN 500 MG
2 TABLET ORAL
Qty: 0 | Refills: 0 | DISCHARGE
Start: 2023-01-25

## 2023-01-25 RX ADMIN — APIXABAN 2.5 MILLIGRAM(S): 2.5 TABLET, FILM COATED ORAL at 05:21

## 2023-01-25 RX ADMIN — Medication 20 MILLIGRAM(S): at 05:22

## 2023-01-25 RX ADMIN — AMLODIPINE BESYLATE 10 MILLIGRAM(S): 2.5 TABLET ORAL at 05:22

## 2023-01-25 RX ADMIN — ISOSORBIDE DINITRATE 20 MILLIGRAM(S): 5 TABLET ORAL at 05:21

## 2023-01-25 RX ADMIN — Medication 650 MILLIGRAM(S): at 09:12

## 2023-01-25 RX ADMIN — Medication 10 MILLIGRAM(S): at 13:31

## 2023-01-25 RX ADMIN — Medication 25 MILLIGRAM(S): at 05:22

## 2023-01-25 NOTE — PROGRESS NOTE ADULT - ASSESSMENT
89-year-old male with past medical history BPH, hyperlipidemia, GERD, CAD status post CABG, CVA, atrial fibrillation on Eliquis, hypertension presents with altered mental status worsening over the past few weeks. There are no signs of infection, and patient likely is progressively declining due to age. Admitted for possible placement.   CTH negative, pt. afebrile with no leukocytosis.  Pt. forcefully refuses food, gets very agitated when attempts made to feed him.  Pt. also pulled IV, unable to hydrate at this time.  Pt. awaiting possible placement, CM/SW consulted.  Spoke to pt.'s daughter Alaina at [phone], updated her re pt.'s condition.  Pt.'s daughter will discuss with family re placement vs. home with HHA.  1/24-Less agitated while in bed, also eating portions of his meals.

## 2023-01-25 NOTE — DISCHARGE NOTE PROVIDER - NSDCCPCAREPLAN_GEN_ALL_CORE_FT
PRINCIPAL DISCHARGE DIAGNOSIS  Diagnosis: Altered mental status  Assessment and Plan of Treatment: You were brought to hospital due to declining mental status likely due to advanced age.  No signs of infection noted.  CT scan of your brain showed chronic infarcts, with no acute intracranial bleed, mass effect or acute infarct.  You were observed closely to maintain your safety, noted with episodes of agitation responding well to redirection.   You were followed by  and  in collaboration with your daughter and appropriate facility was chosen for you.      SECONDARY DISCHARGE DIAGNOSES  Diagnosis: HTN (hypertension)  Assessment and Plan of Treatment: Your blood pressure is well controlled on current regimen.  Continue medications as prescribed.    Diagnosis: History of atrial fibrillation  Assessment and Plan of Treatment: Continue medications as prescribed.    Diagnosis: Severe protein-calorie malnutrition  Assessment and Plan of Treatment: You noted with poor appetite initially refusing all foods.  However you are now with good appetite, eats most meals fed to you.

## 2023-01-25 NOTE — PROGRESS NOTE ADULT - TIME BILLING
- Review of records, telemetry, vital signs and daily labs.   - General and cardiovascular physical examination.  - Generation of cardiovascular treatment plan.  - Coordination of care.      Patient was seen and examined by me on 1/23/23,interim events noted,labs and radiology studies reviewed.  Adrian Marshall MD,FACC.  [address] [place], [place] [id number].  [phone]
I have examined pt personally Hx chart lab and xrays reviewed and pt discussed with staff
- Review of records, telemetry, vital signs and daily labs.   - General and cardiovascular physical examination.  - Generation of cardiovascular treatment plan.  - Coordination of care.      Patient was seen and examined by me on 1/25/23,interim events noted,labs and radiology studies reviewed.  Adrian Marshall MD,FACC.  [address] [place], [place] [id number].  [phone]
- Review of records, telemetry, vital signs and daily labs.   - General and cardiovascular physical examination.  - Generation of cardiovascular treatment plan.  - Coordination of care.      Patient was seen and examined by me on 1/24/23,interim events noted,labs and radiology studies reviewed.  Adrian Marshall MD,FACC.  [address] [place], [place] [id number].  [phone]
GOC

## 2023-01-25 NOTE — PROGRESS NOTE ADULT - PROVIDER SPECIALTY LIST ADULT
Internal Medicine
Pulmonology
Internal Medicine
Cardiology

## 2023-01-25 NOTE — PROGRESS NOTE ADULT - PROBLEM SELECTOR PROBLEM 1
Encephalopathy acute

## 2023-01-25 NOTE — PROGRESS NOTE ADULT - PROBLEM SELECTOR PROBLEM 3
History of atrial fibrillation

## 2023-01-25 NOTE — PROGRESS NOTE ADULT - SUBJECTIVE AND OBJECTIVE BOX
PULMONARY  progress note    ROSARIO RUIZ  MRN-[id number]    Patient is a 89y old  Male who presents with a chief complaint of AMS (24 Jan 2023 09:55)  no cough or sob      MEDICATIONS  (STANDING):  amLODIPine   Tablet 10 milliGRAM(s) Oral daily  apixaban 2.5 milliGRAM(s) Oral every 12 hours  atorvastatin 40 milliGRAM(s) Oral at bedtime  furosemide    Tablet 20 milliGRAM(s) Oral daily  isosorbide   dinitrate Tablet (ISORDIL) 20 milliGRAM(s) Oral two times a day  metoprolol succinate ER 25 milliGRAM(s) Oral daily  PARoxetine 10 milliGRAM(s) Oral daily      MEDICATIONS  (PRN):  acetaminophen     Tablet .. 650 milliGRAM(s) Oral every 6 hours PRN Moderate Pain (4 - 6)      Allergies    No Known Allergies          PAST MEDICAL & SURGICAL HISTORY:  Accelerated Hypertension      Coronary artery disease   hypertension   atrial fibrillation  S/P CABG (coronary artery bypass graft)        Vital Signs Last 24 Hrs  T(C): 36.9 (25 Jan 2023 09:51), Max: 37.1 (24 Jan 2023 14:43)  T(F): 98.4 (25 Jan 2023 09:51), Max: 98.7 (24 Jan 2023 14:43)  HR: 59 (25 Jan 2023 09:51) (59 - 113)  BP: 132/59 (25 Jan 2023 09:51) (132/59 - 180/76)  BP(mean): --  RR: 20 (25 Jan 2023 09:51) (18 - 20)  SpO2: 98% (25 Jan 2023 09:51) (95% - 99%)    Parameters below as of 25 Jan 2023 09:51  Patient On (Oxygen Delivery Method): nasal cannula      I&O's Detail    25 Jan 2023 07:01  -  25 Jan 2023 09:59  --------------------------------------------------------  IN:  Total IN: 0 mL    OUT:    Voided (mL): 210 mL  Total OUT: 210 mL    Total NET: -210 mL          PHYSICAL EXAMINATION:    GENERAL: The patient is a thin elderly male in no apparent distress.   SKIN no rash ecchymoses or bruises  HEENT: Head is normocephalic and atraumatic  DAYRON , Extraocular muscles are intact. Mucous membranes  moist.   Neck supple ,No LN felt JVP not increased  Thyroid not enlarged  Cardiovascular:  S1 S2 heard, AF , No JVD , systolic  murmur at apex, No gallop or rub  Respiratory: Chest wall symmetrical with good air entry ,Percussion note normal,    Lungs vesicular breathing with  no  rales  or  wheeze	  ABDOMEN:  Soft, Non-tender, no hepatomegaly or splenomegaly BS positive	  Extremities: Normal range of motion, No clubbing, cyanosis or edema  Vascular: Peripheral pulses palpable 2+ bilaterally  CNS: lethargic but arousable, not oriented  motor power5/5  dtr 2+  Babinski neg        LABS:                        8.9    9.40  )-----------( 164      ( 25 Jan 2023 06:00 )             27.3     01-25    140  |  108  |  14  ----------------------------<  106<H>  3.7   |  21<L>  |  1.36<H>    Ca    8.6      25 Jan 2023 06:00          MICROBIOLOGY:    Culture - Urine (collected 01-22-23 @ 05:53)  Source: Clean Catch Clean Catch (Midstream)  Preliminary Report (01-24-23 @ 11:21):    10,000 - 49,000 CFU/mL Proteus mirabilis

## 2023-01-25 NOTE — PROGRESS NOTE ADULT - PROBLEM SELECTOR PLAN 3
h/o chronic afib on Eliquis 5mg BID and toprol 25mg  c/w home meds

## 2023-01-25 NOTE — PROGRESS NOTE ADULT - ASSESSMENT
Pulm Htn ? Etio   Cough ?asp  Dementia   CABG with old MI with AF  with comp CHF   Old CVA   HCVD with MR   Anemia-- macrocytic   DNR      Plan-- Supportive care  TSH, B12 and folate levels  Continue with present med   add folic acid 1 mg qd and B12 500 mcg qd po  Eliquis, lopressor,isordil  Aricept 5 mg qd  SS for placement

## 2023-01-25 NOTE — DISCHARGE NOTE PROVIDER - NSDCMRMEDTOKEN_GEN_ALL_CORE_FT
amLODIPine 5 mg oral tablet: 1 tab(s) orally once a day  atorvastatin 40 mg oral tablet: 1 tab(s) orally once a day  B-12 1000 mcg oral tablet: 1 tab(s) orally once a day   Eliquis 2.5 mg oral tablet: 1 tab(s) orally 2 times a day   Flomax 0.4 mg oral capsule: 1 cap(s) orally once a day  folic acid 1 mg oral tablet: 1 tab(s) orally once a day   isosorbide dinitrate 20 mg oral tablet: 1 tab(s) orally 2 times   Lasix 20 mg oral tablet: 1 tab(s) orally once a day   PARoxetine 10 mg oral tablet: 1 tab(s) orally once a day  Toprol-XL 25 mg oral tablet, extended release: 1 tab(s) orally once a day   acetaminophen 325 mg oral tablet: 2 tab(s) orally every 6 hours, As needed, Moderate Pain (4 - 6)  amLODIPine 10 mg oral tablet: 1 tab(s) orally once a day  atorvastatin 40 mg oral tablet: 1 tab(s) orally once a day  B-12 1000 mcg oral tablet: 1 tab(s) orally once a day   Eliquis 2.5 mg oral tablet: 1 tab(s) orally 2 times a day   Flomax 0.4 mg oral capsule: 1 cap(s) orally once a day  folic acid 1 mg oral tablet: 1 tab(s) orally once a day   isosorbide dinitrate 20 mg oral tablet: 1 tab(s) orally 2 times   Lasix 20 mg oral tablet: 1 tab(s) orally once a day   PARoxetine 10 mg oral tablet: 1 tab(s) orally once a day  Toprol-XL 25 mg oral tablet, extended release: 1 tab(s) orally once a day

## 2023-01-25 NOTE — DISCHARGE NOTE NURSING/CASE MANAGEMENT/SOCIAL WORK - NSDCPEFALRISK_GEN_ALL_CORE
For information on Fall & Injury Prevention, visit: [email or web address] OR  [email or web address] OR  https://www.cdc.gov/steadi/patient.html

## 2023-01-25 NOTE — PROGRESS NOTE ADULT - PROBLEM SELECTOR PLAN 4
pt is on amlodipine, metoprolol, and isosorbide dinitrate  c/w home meds  DASh diet

## 2023-01-25 NOTE — PROGRESS NOTE ADULT - SUBJECTIVE AND OBJECTIVE BOX
PATIENT SEEN AND EXAMINED ON :- 1/25/23  DATE OF SERVICE:   1/25/23          Interim events noted,Labs ,Radiological studies and Cardiology tests reviewed .       HOSPITAL COURSE: HPI:  89-year-old male with past medical history BPH, hyperlipidemia, GERD, CAD status post CABG, CVA, atrial fibrillation on Eliquis, hypertension presents with altered mental status worsening over the past few weeks. Patient is unable to provide any history.  Per chart review, patients  Home health aide states that patient's has been more confused over the past week with agitation, states patient attempted to leave the house today.  Denies any injury or fall.  Denies any fevers, difficulty breathing, vomiting, bloody stools, or rash.  Denies any additional complaints. Patient was last discharged on Jan 5th for HTN emergency and at that time JOE was recommended but family refused     In the ED:  Afebrile, hemodynamically stable  No leukocytosis  CTH negative  s/p 500cc bolus    (22 Jan 2023 06:27)      INTERIM EVENTS:Patient seen at bedside ,interim events noted.      PMH -reviewed admission note, no change since admission  HEART FAILURE: Acute[ ]Chronic[ ] Systolic[ ] Diastolic[ ] Combined Systolic and Diastolic[ ]  CAD[ ] CABG[ ] PCI[ ]  DEVICES[ ] PPM[ ] ICD[ ] ILR[ ]  ATRIAL FIBRILLATION[ ] Paroxysmal[ ] Permanent[ ] CHADS2-[  ]  NARINDER[ ] CKD1[ ] CKD2[ ] CKD3[ ] CKD4[ ] ESRD[ ]  COPD[ ] HTN[ ]   DM[ ] Type1[ ] Type 2[ ]   CVA[ ] Paresis[ ]    AMBULATION: Assisted[ ] Cane/walker[ ] Independent[ ]    MEDICATIONS  (STANDING):  amLODIPine   Tablet 10 milliGRAM(s) Oral daily  apixaban 2.5 milliGRAM(s) Oral every 12 hours  atorvastatin 40 milliGRAM(s) Oral at bedtime  furosemide    Tablet 20 milliGRAM(s) Oral daily  isosorbide   dinitrate Tablet (ISORDIL) 20 milliGRAM(s) Oral two times a day  metoprolol succinate ER 25 milliGRAM(s) Oral daily  PARoxetine 10 milliGRAM(s) Oral daily    MEDICATIONS  (PRN):  acetaminophen     Tablet .. 650 milliGRAM(s) Oral every 6 hours PRN Moderate Pain (4 - 6)            REVIEW OF SYSTEMS:  Constitutional: [ ] fever, [ ]weight loss,  [ ]fatigue [ ]weight gain  Eyes: [ ] visual changes  Respiratory: [ ]shortness of breath;  [ ] cough, [ ]wheezing, [ ]chills, [ ]hemoptysis  Cardiovascular: [ ] chest pain, [ ]palpitations, [ ]dizziness,  [ ]leg swelling[ ]orthopnea[ ]PND  Gastrointestinal: [ ] abdominal pain, [ ]nausea, [ ]vomiting,  [ ]diarrhea [ ]Constipation [ ]Melena  Genitourinary: [ ] dysuria, [ ] hematuria [ ][place]over  Neurologic: [ ] headaches [ ] tremors[ ]weakness [ ]Paralysis Right[ ] Left[ ]  Skin: [ ] itching, [ ]burning, [ ] rashes  Endocrine: [ ] heat or cold intolerance  Musculoskeletal: [ ] joint pain or swelling; [ ] muscle, back, or extremity pain  Psychiatric: [ ] depression, [ ]anxiety, [ ]mood swings, or [ ]difficulty sleeping  Hematologic: [ ] easy bruising, [ ] bleeding gums    [ ] All remaining systems negative except as per above.   [ ]Unable to obtain.  [x] No change in ROS since admission      Vital Signs Last 24 Hrs  T(C): 36.9 (25 Jan 2023 09:51), Max: 36.9 (25 Jan 2023 09:51)  T(F): 98.4 (25 Jan 2023 09:51), Max: 98.4 (25 Jan 2023 09:51)  HR: 59 (25 Jan 2023 09:51) (59 - 113)  BP: 132/59 (25 Jan 2023 09:51) (132/59 - 174/80)  BP(mean): --  RR: 20 (25 Jan 2023 09:51) (18 - 20)  SpO2: 98% (25 Jan 2023 09:51) (95% - 98%)    Parameters below as of 25 Jan 2023 09:51  Patient On (Oxygen Delivery Method): nasal cannula      I&O's Summary    25 Jan 2023 07:01  -  25 Jan 2023 17:57  --------------------------------------------------------  IN: 0 mL / OUT: 210 mL / NET: -210 mL        PHYSICAL EXAM:  General: No acute distress BMI-  HEENT: EOMI, PERRL  Neck: Supple, [ ] JVD  Lungs: Equal air entry bilaterally; [ ] rales [ ] wheezing [ ] rhonchi  Heart: Regular rate and rhythm; [x ] murmur   2/6 [ x] systolic [ ] diastolic [ ] radiation[ ] rubs [ ]  gallops  Abdomen: Nontender, bowel sounds present  Extremities: No clubbing, cyanosis, [ ] edema [ ]Pulses  equal and intact  Nervous system:  Alert & Oriented X3, no focal deficits  Psychiatric: Normal affect  Skin: No rashes or lesions    LABS:  01-25    140  |  108  |  14  ----------------------------<  106<H>  3.7   |  21<L>  |  1.36<H>    Ca    8.6      25 Jan 2023 06:00      Creatinine Trend: 1.36<--, 1.37<--, 1.43<--, 1.36<--, 1.37<--, 1.24<--                        8.9    9.40  )-----------( 164      ( 25 Jan 2023 06:00 )             27.3

## 2023-01-25 NOTE — DISCHARGE NOTE PROVIDER - HOSPITAL COURSE
89-year-old male with past medical history BPH, hyperlipidemia, GERD, CAD status post CABG, CVA, atrial fibrillation on Eliquis, hypertension presents with altered mental status worsening over the past few weeks. There are no signs of infection, and patient likely is progressively declining due to age. Admitted for possible placement.   CTH negative, pt. afebrile with no leukocytosis.  Pt. with episodes of agitation but able to redirect, initially refusing food, now with good appetite.   CM/SW consulted.  Pt.'s daughter Alaina at [phone] is pt.'s HCP 89-year-old male with past medical history BPH, hyperlipidemia, GERD, CAD status post CABG, CVA, atrial fibrillation on Eliquis, hypertension presents with altered mental status worsening over the past few weeks. There are no signs of infection, and patient likely is progressively declining due to age. Admitted for possible placement.   CTH negative, pt. afebrile with no leukocytosis.  Pt. with episodes of agitation but able to redirect, initially refusing food, now with good appetite.   CM/SW consulted.  Pt.'s daughter Alaina at [phone] is pt.'s HCP collaborated with CM re placement in a dementia facility.  Pt. is medically optimized for discharge.

## 2023-01-25 NOTE — DISCHARGE NOTE PROVIDER - CARE PROVIDER_API CALL
Ankit Hancock)  Internal Medicine; Pulmonary Disease  [address] [place] [address]  Phone: [phone]  Fax: [phone]  Follow Up Time:

## 2023-01-25 NOTE — DISCHARGE NOTE NURSING/CASE MANAGEMENT/SOCIAL WORK - PATIENT PORTAL LINK FT
You can access the FollowMyHealth Patient Portal offered by [facility] by registering at the following website: [email or web address]. By joining [employer]’s FollowMyHealth portal, you will also be able to view your health information using other applications (apps) compatible with our system.

## 2023-01-26 ENCOUNTER — TRANSCRIPTION ENCOUNTER (OUTPATIENT)
Age: 89
End: 2023-01-26

## 2023-01-30 ENCOUNTER — TRANSCRIPTION ENCOUNTER (OUTPATIENT)
Age: 89
End: 2023-01-30

## 2023-02-08 ENCOUNTER — APPOINTMENT (OUTPATIENT)
Dept: ORTHOPEDIC SURGERY | Facility: CLINIC | Age: 89
End: 2023-02-08

## 2023-04-28 NOTE — ED PROVIDER NOTE - NEUROLOGICAL, MLM
EP catheter inserted at the left atrium. Alert and oriented, no focal deficits, no motor or sensory deficits.

## 2023-10-18 ENCOUNTER — EMERGENCY (EMERGENCY)
Facility: HOSPITAL | Age: 89
LOS: 1 days | Discharge: HOME | End: 2023-10-18
Attending: STUDENT IN AN ORGANIZED HEALTH CARE EDUCATION/TRAINING PROGRAM
Payer: MEDICARE

## 2023-10-18 VITALS
WEIGHT: 119.93 LBS | RESPIRATION RATE: 18 BRPM | TEMPERATURE: 97 F | HEIGHT: 64 IN | DIASTOLIC BLOOD PRESSURE: 70 MMHG | HEART RATE: 111 BPM | OXYGEN SATURATION: 93 % | SYSTOLIC BLOOD PRESSURE: 120 MMHG

## 2023-10-18 VITALS
OXYGEN SATURATION: 99 % | RESPIRATION RATE: 20 BRPM | HEART RATE: 104 BPM | TEMPERATURE: 98 F | DIASTOLIC BLOOD PRESSURE: 102 MMHG | SYSTOLIC BLOOD PRESSURE: 149 MMHG

## 2023-10-18 DIAGNOSIS — Z95.1 PRESENCE OF AORTOCORONARY BYPASS GRAFT: Chronic | ICD-10-CM

## 2023-10-18 LAB
ALBUMIN SERPL ELPH-MCNC: 3.6 G/DL — SIGNIFICANT CHANGE UP (ref 3.5–5)
ALBUMIN SERPL ELPH-MCNC: 3.6 G/DL — SIGNIFICANT CHANGE UP (ref 3.5–5)
ALP SERPL-CCNC: 72 U/L — SIGNIFICANT CHANGE UP (ref 40–120)
ALP SERPL-CCNC: 72 U/L — SIGNIFICANT CHANGE UP (ref 40–120)
ALT FLD-CCNC: 14 U/L DA — SIGNIFICANT CHANGE UP (ref 10–60)
ALT FLD-CCNC: 14 U/L DA — SIGNIFICANT CHANGE UP (ref 10–60)
ANION GAP SERPL CALC-SCNC: 6 MMOL/L — SIGNIFICANT CHANGE UP (ref 5–17)
ANION GAP SERPL CALC-SCNC: 6 MMOL/L — SIGNIFICANT CHANGE UP (ref 5–17)
ANISOCYTOSIS BLD QL: SLIGHT — SIGNIFICANT CHANGE UP
ANISOCYTOSIS BLD QL: SLIGHT — SIGNIFICANT CHANGE UP
AST SERPL-CCNC: 11 U/L — SIGNIFICANT CHANGE UP (ref 10–40)
AST SERPL-CCNC: 11 U/L — SIGNIFICANT CHANGE UP (ref 10–40)
BASOPHILS # BLD AUTO: 0.07 K/UL — SIGNIFICANT CHANGE UP (ref 0–0.2)
BASOPHILS # BLD AUTO: 0.07 K/UL — SIGNIFICANT CHANGE UP (ref 0–0.2)
BASOPHILS NFR BLD AUTO: 0.5 % — SIGNIFICANT CHANGE UP (ref 0–2)
BASOPHILS NFR BLD AUTO: 0.5 % — SIGNIFICANT CHANGE UP (ref 0–2)
BILIRUB SERPL-MCNC: 0.7 MG/DL — SIGNIFICANT CHANGE UP (ref 0.2–1.2)
BILIRUB SERPL-MCNC: 0.7 MG/DL — SIGNIFICANT CHANGE UP (ref 0.2–1.2)
BUN SERPL-MCNC: 36 MG/DL — HIGH (ref 7–18)
BUN SERPL-MCNC: 36 MG/DL — HIGH (ref 7–18)
CALCIUM SERPL-MCNC: 8.9 MG/DL — SIGNIFICANT CHANGE UP (ref 8.4–10.5)
CALCIUM SERPL-MCNC: 8.9 MG/DL — SIGNIFICANT CHANGE UP (ref 8.4–10.5)
CHLORIDE SERPL-SCNC: 108 MMOL/L — SIGNIFICANT CHANGE UP (ref 96–108)
CHLORIDE SERPL-SCNC: 108 MMOL/L — SIGNIFICANT CHANGE UP (ref 96–108)
CO2 SERPL-SCNC: 21 MMOL/L — LOW (ref 22–31)
CO2 SERPL-SCNC: 21 MMOL/L — LOW (ref 22–31)
CREAT SERPL-MCNC: 1.58 MG/DL — HIGH (ref 0.5–1.3)
CREAT SERPL-MCNC: 1.58 MG/DL — HIGH (ref 0.5–1.3)
EGFR: 38 ML/MIN/1.73M2 — LOW
EGFR: 38 ML/MIN/1.73M2 — LOW
ELLIPTOCYTES BLD QL SMEAR: SLIGHT — SIGNIFICANT CHANGE UP
ELLIPTOCYTES BLD QL SMEAR: SLIGHT — SIGNIFICANT CHANGE UP
EOSINOPHIL # BLD AUTO: 0.04 K/UL — SIGNIFICANT CHANGE UP (ref 0–0.5)
EOSINOPHIL # BLD AUTO: 0.04 K/UL — SIGNIFICANT CHANGE UP (ref 0–0.5)
EOSINOPHIL NFR BLD AUTO: 0.3 % — SIGNIFICANT CHANGE UP (ref 0–6)
EOSINOPHIL NFR BLD AUTO: 0.3 % — SIGNIFICANT CHANGE UP (ref 0–6)
GLUCOSE SERPL-MCNC: 117 MG/DL — HIGH (ref 70–99)
GLUCOSE SERPL-MCNC: 117 MG/DL — HIGH (ref 70–99)
HCT VFR BLD CALC: 28.8 % — LOW (ref 39–50)
HCT VFR BLD CALC: 28.8 % — LOW (ref 39–50)
HGB BLD-MCNC: 9.5 G/DL — LOW (ref 13–17)
HGB BLD-MCNC: 9.5 G/DL — LOW (ref 13–17)
IMM GRANULOCYTES NFR BLD AUTO: 0.4 % — SIGNIFICANT CHANGE UP (ref 0–0.9)
IMM GRANULOCYTES NFR BLD AUTO: 0.4 % — SIGNIFICANT CHANGE UP (ref 0–0.9)
LG PLATELETS BLD QL AUTO: SLIGHT — SIGNIFICANT CHANGE UP
LG PLATELETS BLD QL AUTO: SLIGHT — SIGNIFICANT CHANGE UP
LYMPHOCYTES # BLD AUTO: 1.38 K/UL — SIGNIFICANT CHANGE UP (ref 1–3.3)
LYMPHOCYTES # BLD AUTO: 1.38 K/UL — SIGNIFICANT CHANGE UP (ref 1–3.3)
LYMPHOCYTES # BLD AUTO: 9.5 % — LOW (ref 13–44)
LYMPHOCYTES # BLD AUTO: 9.5 % — LOW (ref 13–44)
MACROCYTES BLD QL: SLIGHT — SIGNIFICANT CHANGE UP
MACROCYTES BLD QL: SLIGHT — SIGNIFICANT CHANGE UP
MANUAL SMEAR VERIFICATION: SIGNIFICANT CHANGE UP
MANUAL SMEAR VERIFICATION: SIGNIFICANT CHANGE UP
MCHC RBC-ENTMCNC: 33 GM/DL — SIGNIFICANT CHANGE UP (ref 32–36)
MCHC RBC-ENTMCNC: 33 GM/DL — SIGNIFICANT CHANGE UP (ref 32–36)
MCHC RBC-ENTMCNC: 33.3 PG — SIGNIFICANT CHANGE UP (ref 27–34)
MCHC RBC-ENTMCNC: 33.3 PG — SIGNIFICANT CHANGE UP (ref 27–34)
MCV RBC AUTO: 101.1 FL — HIGH (ref 80–100)
MCV RBC AUTO: 101.1 FL — HIGH (ref 80–100)
MONOCYTES # BLD AUTO: 1.64 K/UL — HIGH (ref 0–0.9)
MONOCYTES # BLD AUTO: 1.64 K/UL — HIGH (ref 0–0.9)
MONOCYTES NFR BLD AUTO: 11.2 % — SIGNIFICANT CHANGE UP (ref 2–14)
MONOCYTES NFR BLD AUTO: 11.2 % — SIGNIFICANT CHANGE UP (ref 2–14)
NEUTROPHILS # BLD AUTO: 11.41 K/UL — HIGH (ref 1.8–7.4)
NEUTROPHILS # BLD AUTO: 11.41 K/UL — HIGH (ref 1.8–7.4)
NEUTROPHILS NFR BLD AUTO: 78.1 % — HIGH (ref 43–77)
NEUTROPHILS NFR BLD AUTO: 78.1 % — HIGH (ref 43–77)
NRBC # BLD: 0 /100 WBCS — SIGNIFICANT CHANGE UP (ref 0–0)
NRBC # BLD: 0 /100 WBCS — SIGNIFICANT CHANGE UP (ref 0–0)
OVALOCYTES BLD QL SMEAR: SLIGHT — SIGNIFICANT CHANGE UP
OVALOCYTES BLD QL SMEAR: SLIGHT — SIGNIFICANT CHANGE UP
PLAT MORPH BLD: NORMAL — SIGNIFICANT CHANGE UP
PLAT MORPH BLD: NORMAL — SIGNIFICANT CHANGE UP
PLATELET # BLD AUTO: 183 K/UL — SIGNIFICANT CHANGE UP (ref 150–400)
PLATELET # BLD AUTO: 183 K/UL — SIGNIFICANT CHANGE UP (ref 150–400)
PLATELET COUNT - ESTIMATE: ABNORMAL
PLATELET COUNT - ESTIMATE: ABNORMAL
POIKILOCYTOSIS BLD QL AUTO: SLIGHT — SIGNIFICANT CHANGE UP
POIKILOCYTOSIS BLD QL AUTO: SLIGHT — SIGNIFICANT CHANGE UP
POTASSIUM SERPL-MCNC: 4.6 MMOL/L — SIGNIFICANT CHANGE UP (ref 3.5–5.3)
POTASSIUM SERPL-MCNC: 4.6 MMOL/L — SIGNIFICANT CHANGE UP (ref 3.5–5.3)
POTASSIUM SERPL-SCNC: 4.6 MMOL/L — SIGNIFICANT CHANGE UP (ref 3.5–5.3)
POTASSIUM SERPL-SCNC: 4.6 MMOL/L — SIGNIFICANT CHANGE UP (ref 3.5–5.3)
PROT SERPL-MCNC: 7 G/DL — SIGNIFICANT CHANGE UP (ref 6–8.3)
PROT SERPL-MCNC: 7 G/DL — SIGNIFICANT CHANGE UP (ref 6–8.3)
RBC # BLD: 2.85 M/UL — LOW (ref 4.2–5.8)
RBC # BLD: 2.85 M/UL — LOW (ref 4.2–5.8)
RBC # FLD: 13.5 % — SIGNIFICANT CHANGE UP (ref 10.3–14.5)
RBC # FLD: 13.5 % — SIGNIFICANT CHANGE UP (ref 10.3–14.5)
RBC BLD AUTO: ABNORMAL
RBC BLD AUTO: ABNORMAL
SODIUM SERPL-SCNC: 135 MMOL/L — SIGNIFICANT CHANGE UP (ref 135–145)
SODIUM SERPL-SCNC: 135 MMOL/L — SIGNIFICANT CHANGE UP (ref 135–145)
WBC # BLD: 14.6 K/UL — HIGH (ref 3.8–10.5)
WBC # BLD: 14.6 K/UL — HIGH (ref 3.8–10.5)
WBC # FLD AUTO: 14.6 K/UL — HIGH (ref 3.8–10.5)
WBC # FLD AUTO: 14.6 K/UL — HIGH (ref 3.8–10.5)

## 2023-10-18 PROCEDURE — 99285 EMERGENCY DEPT VISIT HI MDM: CPT | Mod: 25

## 2023-10-18 PROCEDURE — 87040 BLOOD CULTURE FOR BACTERIA: CPT

## 2023-10-18 PROCEDURE — 93971 EXTREMITY STUDY: CPT | Mod: 26,RT

## 2023-10-18 PROCEDURE — 73080 X-RAY EXAM OF ELBOW: CPT | Mod: 26,RT

## 2023-10-18 PROCEDURE — 96374 THER/PROPH/DIAG INJ IV PUSH: CPT

## 2023-10-18 PROCEDURE — 93005 ELECTROCARDIOGRAM TRACING: CPT

## 2023-10-18 PROCEDURE — 73080 X-RAY EXAM OF ELBOW: CPT

## 2023-10-18 PROCEDURE — 99285 EMERGENCY DEPT VISIT HI MDM: CPT

## 2023-10-18 PROCEDURE — 36415 COLL VENOUS BLD VENIPUNCTURE: CPT

## 2023-10-18 PROCEDURE — 93971 EXTREMITY STUDY: CPT

## 2023-10-18 PROCEDURE — 85025 COMPLETE CBC W/AUTO DIFF WBC: CPT

## 2023-10-18 PROCEDURE — 80053 COMPREHEN METABOLIC PANEL: CPT

## 2023-10-18 RX ORDER — ACETAMINOPHEN 500 MG
1000 TABLET ORAL ONCE
Refills: 0 | Status: COMPLETED | OUTPATIENT
Start: 2023-10-18 | End: 2023-10-18

## 2023-10-18 RX ADMIN — Medication 300 MILLIGRAM(S): at 18:08

## 2023-10-18 RX ADMIN — Medication 400 MILLIGRAM(S): at 17:04

## 2023-10-18 NOTE — ED ADULT NURSE NOTE - NSFALLRISKINTERV_ED_ALL_ED

## 2023-10-18 NOTE — ED PROVIDER NOTE - PATIENT PORTAL LINK FT
You can access the FollowMyHealth Patient Portal offered by [facility] by registering at the following website: [email or web address]. By joining [employer]’s FollowMyHealth portal, you will also be able to view your health information using other applications (apps) compatible with our system.

## 2023-10-18 NOTE — ED PROVIDER NOTE - OBJECTIVE STATEMENT
89M, pmh of BPH, hyperlipidemia, GERD, CAD status post CABG, CVA, atrial fibrillation on Eliquis, hypertension, Presenting with right arm swelling.  Patient is hard of hearing and unable to provide reliable history.  History provided by patient's daughter-in-law Alaina and aide at bedside.  Patient was in his normal state of health yesterday per usual aide but she received a phone call around 6 AM this morning saying the patient was crying and yelling that his arm was hurting him.  When the morning aide arrived it was noted that his right arm was red and swollen and the patient had pain when it was touched.  No reported falls or other injuries to elbow.  Daughter reports the patient had previous surgery to his right arm several decades ago after his arm was caught in a machine at work.

## 2023-10-18 NOTE — ED PROVIDER NOTE - CLINICAL SUMMARY MEDICAL DECISION MAKING FREE TEXT BOX
89-year-old male presenting with right arm pain and swelling.  Full passive elbow range of motion and tenderness appears to be more with humeral palpation versus palpation along the elbow to lower concern for septic joint.  Symptoms likely secondary to cellulitis but concern for upper extremity DVT given sudden onset of symptoms as well as patient's previous surgical history in that arm.  Will get labs, ultrasound and x-ray to assess.  Spoke with patient's daughter-in-law over the phone who handles all of his medical care and family would prefer the patient to be discharged with oral antibiotics if the diagnosis is cellulitis. 89-year-old male presenting with right arm pain and swelling.  Full passive elbow range of motion and tenderness appears to be more with humeral palpation versus palpation along the elbow to lower concern for septic joint.  Symptoms likely secondary to cellulitis but concern for upper extremity DVT given sudden onset of symptoms as well as patient's previous surgical history in that arm.  Will get labs, ultrasound and x-ray to assess.  Spoke with patient's daughter-in-law over the phone who handles all of his medical care and family would prefer the patient to be discharged with oral antibiotics if the diagnosis is cellulitis.    US negative for DVT. no emergent labs findings and suspicion still low for septic joint. stable for outpatient followup.

## 2023-10-18 NOTE — ED PROVIDER NOTE - NSFOLLOWUPINSTRUCTIONS_ED_ALL_ED_FT
You were seen in the emergency department for left arm pain and swelling likely caused by cellulitis.     Please follow-up with your primary care doctor within the next 48 hours.     Please finish all of your antibiotics.     Please take tylenol as prescribed on the bottle for pain control.     If you have any worsening symptoms, severe arm pain, swelling, numbness, tingling, or you are unable to bend your elbow, please return to the emergency department.

## 2023-10-18 NOTE — ED PROVIDER NOTE - PHYSICAL EXAMINATION
General: well appearing male, no acute distress   HEENT: normocephalic, atraumatic   Respiratory: normal work of breathing  Cardiac: regular rate and rhythm   MSK: right UE erythema, warmth, tenderness to palpation, full elbow ROM  Skin: warm, dry   Neuro: A&Ox2  Psych: appropriate affect

## 2023-10-18 NOTE — ED ADULT NURSE NOTE - SUICIDE SCREENING DEPRESSION
Per patient's chart, Spironolactone-HCTZ was discontinued at office visit on 11/8/19. Read patient's after visit summary which stated to start taking Aliskiren-HCTZ. Please advise. Negative

## 2023-10-23 ENCOUNTER — INPATIENT (INPATIENT)
Facility: HOSPITAL | Age: 89
LOS: 1 days | Discharge: HOME | DRG: 603 | End: 2023-10-25
Attending: STUDENT IN AN ORGANIZED HEALTH CARE EDUCATION/TRAINING PROGRAM | Admitting: STUDENT IN AN ORGANIZED HEALTH CARE EDUCATION/TRAINING PROGRAM
Payer: MEDICARE

## 2023-10-23 VITALS
DIASTOLIC BLOOD PRESSURE: 82 MMHG | OXYGEN SATURATION: 95 % | TEMPERATURE: 98 F | HEART RATE: 80 BPM | WEIGHT: 115.08 LBS | SYSTOLIC BLOOD PRESSURE: 130 MMHG | RESPIRATION RATE: 18 BRPM | HEIGHT: 64 IN

## 2023-10-23 DIAGNOSIS — I10 ESSENTIAL (PRIMARY) HYPERTENSION: ICD-10-CM

## 2023-10-23 DIAGNOSIS — L03.90 CELLULITIS, UNSPECIFIED: ICD-10-CM

## 2023-10-23 DIAGNOSIS — F03.90 UNSPECIFIED DEMENTIA WITHOUT BEHAVIORAL DISTURBANCE: ICD-10-CM

## 2023-10-23 DIAGNOSIS — Z95.1 PRESENCE OF AORTOCORONARY BYPASS GRAFT: Chronic | ICD-10-CM

## 2023-10-23 DIAGNOSIS — E78.5 HYPERLIPIDEMIA, UNSPECIFIED: ICD-10-CM

## 2023-10-23 DIAGNOSIS — I48.20 CHRONIC ATRIAL FIBRILLATION, UNSPECIFIED: ICD-10-CM

## 2023-10-23 DIAGNOSIS — Z29.9 ENCOUNTER FOR PROPHYLACTIC MEASURES, UNSPECIFIED: ICD-10-CM

## 2023-10-23 LAB
ALBUMIN SERPL ELPH-MCNC: 3.3 G/DL — LOW (ref 3.5–5)
ALBUMIN SERPL ELPH-MCNC: 3.3 G/DL — LOW (ref 3.5–5)
ALP SERPL-CCNC: 82 U/L — SIGNIFICANT CHANGE UP (ref 40–120)
ALP SERPL-CCNC: 82 U/L — SIGNIFICANT CHANGE UP (ref 40–120)
ALT FLD-CCNC: 17 U/L DA — SIGNIFICANT CHANGE UP (ref 10–60)
ALT FLD-CCNC: 17 U/L DA — SIGNIFICANT CHANGE UP (ref 10–60)
ANION GAP SERPL CALC-SCNC: 9 MMOL/L — SIGNIFICANT CHANGE UP (ref 5–17)
ANION GAP SERPL CALC-SCNC: 9 MMOL/L — SIGNIFICANT CHANGE UP (ref 5–17)
APTT BLD: 41.9 SEC — HIGH (ref 24.5–35.6)
APTT BLD: 41.9 SEC — HIGH (ref 24.5–35.6)
AST SERPL-CCNC: 6 U/L — LOW (ref 10–40)
AST SERPL-CCNC: 6 U/L — LOW (ref 10–40)
BASOPHILS # BLD AUTO: 0.06 K/UL — SIGNIFICANT CHANGE UP (ref 0–0.2)
BASOPHILS # BLD AUTO: 0.06 K/UL — SIGNIFICANT CHANGE UP (ref 0–0.2)
BASOPHILS NFR BLD AUTO: 0.8 % — SIGNIFICANT CHANGE UP (ref 0–2)
BASOPHILS NFR BLD AUTO: 0.8 % — SIGNIFICANT CHANGE UP (ref 0–2)
BILIRUB SERPL-MCNC: 0.3 MG/DL — SIGNIFICANT CHANGE UP (ref 0.2–1.2)
BILIRUB SERPL-MCNC: 0.3 MG/DL — SIGNIFICANT CHANGE UP (ref 0.2–1.2)
BUN SERPL-MCNC: 34 MG/DL — HIGH (ref 7–18)
BUN SERPL-MCNC: 34 MG/DL — HIGH (ref 7–18)
CALCIUM SERPL-MCNC: 8.8 MG/DL — SIGNIFICANT CHANGE UP (ref 8.4–10.5)
CALCIUM SERPL-MCNC: 8.8 MG/DL — SIGNIFICANT CHANGE UP (ref 8.4–10.5)
CHLORIDE SERPL-SCNC: 110 MMOL/L — HIGH (ref 96–108)
CHLORIDE SERPL-SCNC: 110 MMOL/L — HIGH (ref 96–108)
CO2 SERPL-SCNC: 21 MMOL/L — LOW (ref 22–31)
CO2 SERPL-SCNC: 21 MMOL/L — LOW (ref 22–31)
CREAT SERPL-MCNC: 1.6 MG/DL — HIGH (ref 0.5–1.3)
CREAT SERPL-MCNC: 1.6 MG/DL — HIGH (ref 0.5–1.3)
CULTURE RESULTS: SIGNIFICANT CHANGE UP
EGFR: 38 ML/MIN/1.73M2 — LOW
EGFR: 38 ML/MIN/1.73M2 — LOW
EOSINOPHIL # BLD AUTO: 0.16 K/UL — SIGNIFICANT CHANGE UP (ref 0–0.5)
EOSINOPHIL # BLD AUTO: 0.16 K/UL — SIGNIFICANT CHANGE UP (ref 0–0.5)
EOSINOPHIL NFR BLD AUTO: 2.2 % — SIGNIFICANT CHANGE UP (ref 0–6)
EOSINOPHIL NFR BLD AUTO: 2.2 % — SIGNIFICANT CHANGE UP (ref 0–6)
GLUCOSE SERPL-MCNC: 108 MG/DL — HIGH (ref 70–99)
GLUCOSE SERPL-MCNC: 108 MG/DL — HIGH (ref 70–99)
HCT VFR BLD CALC: 27.1 % — LOW (ref 39–50)
HCT VFR BLD CALC: 27.1 % — LOW (ref 39–50)
HGB BLD-MCNC: 8.9 G/DL — LOW (ref 13–17)
HGB BLD-MCNC: 8.9 G/DL — LOW (ref 13–17)
IMM GRANULOCYTES NFR BLD AUTO: 0.5 % — SIGNIFICANT CHANGE UP (ref 0–0.9)
IMM GRANULOCYTES NFR BLD AUTO: 0.5 % — SIGNIFICANT CHANGE UP (ref 0–0.9)
INR BLD: 1.55 RATIO — HIGH (ref 0.85–1.18)
INR BLD: 1.55 RATIO — HIGH (ref 0.85–1.18)
LYMPHOCYTES # BLD AUTO: 1.93 K/UL — SIGNIFICANT CHANGE UP (ref 1–3.3)
LYMPHOCYTES # BLD AUTO: 1.93 K/UL — SIGNIFICANT CHANGE UP (ref 1–3.3)
LYMPHOCYTES # BLD AUTO: 26.1 % — SIGNIFICANT CHANGE UP (ref 13–44)
LYMPHOCYTES # BLD AUTO: 26.1 % — SIGNIFICANT CHANGE UP (ref 13–44)
MCHC RBC-ENTMCNC: 32.8 GM/DL — SIGNIFICANT CHANGE UP (ref 32–36)
MCHC RBC-ENTMCNC: 32.8 GM/DL — SIGNIFICANT CHANGE UP (ref 32–36)
MCHC RBC-ENTMCNC: 33 PG — SIGNIFICANT CHANGE UP (ref 27–34)
MCHC RBC-ENTMCNC: 33 PG — SIGNIFICANT CHANGE UP (ref 27–34)
MCV RBC AUTO: 100.4 FL — HIGH (ref 80–100)
MCV RBC AUTO: 100.4 FL — HIGH (ref 80–100)
MONOCYTES # BLD AUTO: 1.08 K/UL — HIGH (ref 0–0.9)
MONOCYTES # BLD AUTO: 1.08 K/UL — HIGH (ref 0–0.9)
MONOCYTES NFR BLD AUTO: 14.6 % — HIGH (ref 2–14)
MONOCYTES NFR BLD AUTO: 14.6 % — HIGH (ref 2–14)
NEUTROPHILS # BLD AUTO: 4.13 K/UL — SIGNIFICANT CHANGE UP (ref 1.8–7.4)
NEUTROPHILS # BLD AUTO: 4.13 K/UL — SIGNIFICANT CHANGE UP (ref 1.8–7.4)
NEUTROPHILS NFR BLD AUTO: 55.8 % — SIGNIFICANT CHANGE UP (ref 43–77)
NEUTROPHILS NFR BLD AUTO: 55.8 % — SIGNIFICANT CHANGE UP (ref 43–77)
NRBC # BLD: 0 /100 WBCS — SIGNIFICANT CHANGE UP (ref 0–0)
NRBC # BLD: 0 /100 WBCS — SIGNIFICANT CHANGE UP (ref 0–0)
PLATELET # BLD AUTO: 241 K/UL — SIGNIFICANT CHANGE UP (ref 150–400)
PLATELET # BLD AUTO: 241 K/UL — SIGNIFICANT CHANGE UP (ref 150–400)
POTASSIUM SERPL-MCNC: 4.7 MMOL/L — SIGNIFICANT CHANGE UP (ref 3.5–5.3)
POTASSIUM SERPL-MCNC: 4.7 MMOL/L — SIGNIFICANT CHANGE UP (ref 3.5–5.3)
POTASSIUM SERPL-SCNC: 4.7 MMOL/L — SIGNIFICANT CHANGE UP (ref 3.5–5.3)
POTASSIUM SERPL-SCNC: 4.7 MMOL/L — SIGNIFICANT CHANGE UP (ref 3.5–5.3)
PROT SERPL-MCNC: 6.9 G/DL — SIGNIFICANT CHANGE UP (ref 6–8.3)
PROT SERPL-MCNC: 6.9 G/DL — SIGNIFICANT CHANGE UP (ref 6–8.3)
PROTHROM AB SERPL-ACNC: 17.4 SEC — HIGH (ref 9.5–13)
PROTHROM AB SERPL-ACNC: 17.4 SEC — HIGH (ref 9.5–13)
RBC # BLD: 2.7 M/UL — LOW (ref 4.2–5.8)
RBC # BLD: 2.7 M/UL — LOW (ref 4.2–5.8)
RBC # FLD: 13 % — SIGNIFICANT CHANGE UP (ref 10.3–14.5)
RBC # FLD: 13 % — SIGNIFICANT CHANGE UP (ref 10.3–14.5)
SODIUM SERPL-SCNC: 140 MMOL/L — SIGNIFICANT CHANGE UP (ref 135–145)
SODIUM SERPL-SCNC: 140 MMOL/L — SIGNIFICANT CHANGE UP (ref 135–145)
SPECIMEN SOURCE: SIGNIFICANT CHANGE UP
WBC # BLD: 7.4 K/UL — SIGNIFICANT CHANGE UP (ref 3.8–10.5)
WBC # BLD: 7.4 K/UL — SIGNIFICANT CHANGE UP (ref 3.8–10.5)
WBC # FLD AUTO: 7.4 K/UL — SIGNIFICANT CHANGE UP (ref 3.8–10.5)
WBC # FLD AUTO: 7.4 K/UL — SIGNIFICANT CHANGE UP (ref 3.8–10.5)

## 2023-10-23 PROCEDURE — 99223 1ST HOSP IP/OBS HIGH 75: CPT | Mod: GC

## 2023-10-23 PROCEDURE — 99285 EMERGENCY DEPT VISIT HI MDM: CPT

## 2023-10-23 PROCEDURE — 73200 CT UPPER EXTREMITY W/O DYE: CPT | Mod: 26,RT,MA

## 2023-10-23 RX ORDER — ATORVASTATIN CALCIUM 80 MG/1
1 TABLET, FILM COATED ORAL
Qty: 0 | Refills: 0 | DISCHARGE

## 2023-10-23 RX ORDER — VANCOMYCIN HCL 1 G
1000 VIAL (EA) INTRAVENOUS ONCE
Refills: 0 | Status: COMPLETED | OUTPATIENT
Start: 2023-10-23 | End: 2023-10-23

## 2023-10-23 RX ORDER — ISOSORBIDE DINITRATE 5 MG/1
1 TABLET ORAL
Qty: 0 | Refills: 0 | DISCHARGE

## 2023-10-23 RX ORDER — OLANZAPINE 15 MG/1
2.5 TABLET, FILM COATED ORAL ONCE
Refills: 0 | Status: COMPLETED | OUTPATIENT
Start: 2023-10-23 | End: 2023-10-23

## 2023-10-23 RX ORDER — ISOSORBIDE DINITRATE 5 MG/1
10 TABLET ORAL
Refills: 0 | Status: DISCONTINUED | OUTPATIENT
Start: 2023-10-23 | End: 2023-10-25

## 2023-10-23 RX ORDER — PREGABALIN 225 MG/1
1000 CAPSULE ORAL DAILY
Refills: 0 | Status: DISCONTINUED | OUTPATIENT
Start: 2023-10-23 | End: 2023-10-25

## 2023-10-23 RX ORDER — AMLODIPINE BESYLATE 2.5 MG/1
1 TABLET ORAL
Refills: 0 | DISCHARGE

## 2023-10-23 RX ORDER — METOPROLOL TARTRATE 50 MG
12.5 TABLET ORAL EVERY 12 HOURS
Refills: 0 | Status: DISCONTINUED | OUTPATIENT
Start: 2023-10-23 | End: 2023-10-25

## 2023-10-23 RX ORDER — ACETAMINOPHEN 500 MG
650 TABLET ORAL EVERY 6 HOURS
Refills: 0 | Status: DISCONTINUED | OUTPATIENT
Start: 2023-10-23 | End: 2023-10-25

## 2023-10-23 RX ORDER — APIXABAN 2.5 MG/1
2.5 TABLET, FILM COATED ORAL EVERY 12 HOURS
Refills: 0 | Status: DISCONTINUED | OUTPATIENT
Start: 2023-10-23 | End: 2023-10-25

## 2023-10-23 RX ORDER — AMLODIPINE BESYLATE 2.5 MG/1
2.5 TABLET ORAL DAILY
Refills: 0 | Status: DISCONTINUED | OUTPATIENT
Start: 2023-10-23 | End: 2023-10-25

## 2023-10-23 RX ORDER — ATORVASTATIN CALCIUM 80 MG/1
40 TABLET, FILM COATED ORAL AT BEDTIME
Refills: 0 | Status: DISCONTINUED | OUTPATIENT
Start: 2023-10-23 | End: 2023-10-25

## 2023-10-23 RX ORDER — TAMSULOSIN HYDROCHLORIDE 0.4 MG/1
0.4 CAPSULE ORAL AT BEDTIME
Refills: 0 | Status: DISCONTINUED | OUTPATIENT
Start: 2023-10-23 | End: 2023-10-25

## 2023-10-23 RX ORDER — VANCOMYCIN HCL 1 G
1000 VIAL (EA) INTRAVENOUS EVERY 12 HOURS
Refills: 0 | Status: DISCONTINUED | OUTPATIENT
Start: 2023-10-24 | End: 2023-10-24

## 2023-10-23 RX ORDER — CEFEPIME 1 G/1
2000 INJECTION, POWDER, FOR SOLUTION INTRAMUSCULAR; INTRAVENOUS ONCE
Refills: 0 | Status: COMPLETED | OUTPATIENT
Start: 2023-10-23 | End: 2023-10-23

## 2023-10-23 RX ORDER — TAMSULOSIN HYDROCHLORIDE 0.4 MG/1
1 CAPSULE ORAL
Qty: 0 | Refills: 0 | DISCHARGE

## 2023-10-23 RX ORDER — METOPROLOL TARTRATE 50 MG
1 TABLET ORAL
Qty: 0 | Refills: 0 | DISCHARGE

## 2023-10-23 RX ADMIN — CEFEPIME 100 MILLIGRAM(S): 1 INJECTION, POWDER, FOR SOLUTION INTRAMUSCULAR; INTRAVENOUS at 21:14

## 2023-10-23 RX ADMIN — TAMSULOSIN HYDROCHLORIDE 0.4 MILLIGRAM(S): 0.4 CAPSULE ORAL at 23:26

## 2023-10-23 RX ADMIN — Medication 250 MILLIGRAM(S): at 17:27

## 2023-10-23 RX ADMIN — ATORVASTATIN CALCIUM 40 MILLIGRAM(S): 80 TABLET, FILM COATED ORAL at 23:25

## 2023-10-23 RX ADMIN — OLANZAPINE 2.5 MILLIGRAM(S): 15 TABLET, FILM COATED ORAL at 21:13

## 2023-10-23 NOTE — H&P ADULT - NSHPREVIEWOFSYSTEMS_GEN_ALL_CORE
REVIEW OF SYSTEMS:    ROS could not be obtained from the patient as pt is combative and uncooperative.

## 2023-10-23 NOTE — H&P ADULT - PROBLEM SELECTOR PLAN 2
hx of Afib on Eliquis 2.5 mg BID and Metoprolol 25 mg   c/w Eliquis and Metoprolol tartrate 12.5 mg BID with holding parameters

## 2023-10-23 NOTE — ED ADULT NURSE NOTE - CAS TRG GEN SKIN CONDITION
Warm You can access the FollowMyHealth Patient Portal offered by [facility] by registering at the following website: [email or web address]. By joining [employer]’s FollowMyHealth portal, you will also be able to view your health information using other applications (apps) compatible with our system.

## 2023-10-23 NOTE — ED PROVIDER NOTE - OBJECTIVE STATEMENT
89-year-old man returns to the ER for worsening redness and swelling to the right arm.  Patient was here 5 days ago and had a duplex and x-rays done of the arm which were unremarkable.  Patient leukocytosis at the time and after shared decision making patient was sent home on clindamycin.  Patient's aunt states that the redness seems to have become less prominent however now the swelling has extended to include the wrist and distal forearm whereas before was more in the proximal forearm.  Patient denies any complaints.  Patient's son is a physician and wanted the patient to come for evaluation because he states that the antibiotics seem not to have helped.

## 2023-10-23 NOTE — ED PROVIDER NOTE - CLINICAL SUMMARY MEDICAL DECISION MAKING FREE TEXT BOX
Patient with worsening right forearm swelling.  Labs reveal no leukocytosis.  No drainage from the pustules on the lateral aspect of the elbow concerning for possible collection either subcutaneously or in the bursa or joint.  Orthopedics was called and suggested surgical consult.  Case discussed with general surgery who recommended CT scan of the arm for further evaluation.

## 2023-10-23 NOTE — H&P ADULT - PROBLEM SELECTOR PLAN 1
hx of RUE swelling, dx with cellulitis completed PO Clindamycin without improvement   in ED: afebrile, vitally stable  No leukocytosis   s/p Cefepime in ED  CT scan showed edema, no subcut gas, no osteomyelitis  Surgery consulted, recommended medical mnx   c/w Vancomycin  f/u blood cultures, ESR hx of RUE swelling, dx with cellulitis completed PO Clindamycin without improvement   in ED: afebrile, vitally stable  No leukocytosis   s/p Cefepime in ED  CT scan showed edema, no subcut gas, no osteomyelitis  Surgery consulted, recommended medical mnx   c/w Vancomycin  ID consult in AM   f/u blood cultures, ESR

## 2023-10-23 NOTE — H&P ADULT - NSICDXPASTMEDICALHX_GEN_ALL_CORE_FT
PAST MEDICAL HISTORY:  Coronary artery disease     H/O: hypertension     History of atrial fibrillation

## 2023-10-23 NOTE — ED ADULT NURSE NOTE - NSFALLOOBATTEMPT_ED_ALL_ED
Discussed likely multifactorial etiology. Check labs. Continue to keep scalp moisturized. Fine to try hair/nails over the counter supplement. Will send to derm for additional evaluation    No

## 2023-10-23 NOTE — ED ADULT NURSE NOTE - NSFALLHARMRISKINTERV_ED_ALL_ED

## 2023-10-23 NOTE — ED PROVIDER NOTE - PROGRESS NOTE DETAILS
ADDENDUM by Lester Combs M.D.: I received sign-off from Dr. KAREN Humphries. 44-year-old with worsening cellulitis of the right arm despite clindamycin at home, I was to follow-up CT arm requested by surgery as well as surgery consult. CT no discrete collection. Discussed with LYLY Dillard of surgery and recommending medical admission for IV antibiotics. On exam, NAD, nontoxic appearing, sitting comfortably in bed, no work of breathing, noted right forearm erythema, swelling. Added cefepime. Discussed with daughter by phone. Admitted for further management.

## 2023-10-23 NOTE — ED ADULT NURSE REASSESSMENT NOTE - NS ED NURSE REASSESS COMMENT FT1
When awaken, pt yells out "nurse, nurse!" When attempted to redirected by RN yells, "Get out of here.". Pt raises his hand to hit staff. MD notified, was given olanzapine. Due to pt being uncooperative and agitated, MD aware and was at bedside. She stated that she would draw the blood culture, as pt refused multiple times and won't allow nurses to give him treatment.

## 2023-10-23 NOTE — H&P ADULT - PROBLEM SELECTOR PLAN 3
hx of Dementia baseline AAOx1  on exam AAox0  uncooperative and combative will give IM Zyprexa 2.5 mg once  c/w home med Paroxetine daily  ECG on 10/18

## 2023-10-23 NOTE — H&P ADULT - ASSESSMENT
89 yrs old M, from home, A 24/7, pmhx of HTN, HLD, CAD, Afib on Eliquis, BPH, Dementia [AAOx1 at baseline] presented with Rt UE swelling. Pt is admitted for RUE cellulitis

## 2023-10-23 NOTE — H&P ADULT - NSHPLABSRESULTS_GEN_ALL_CORE
ACC: [id number] EXAM:  CT UPR EXT RT   ORDERED BY: GIO EATON     PROCEDURE DATE:  10/23/2023          INTERPRETATION:  HISTORY: Elbow swelling.    Helical CT imaging of the right upper showing to the level of the distal   humerus to thelevel of the hand was performed without intravenous   contrast. Sagittal and coronal reformats were provided.    FINDINGS:    Evaluation for fluid collection is limited by the lack of intravenous   contrast. There is confluent subcutaneous edema within the region of the   olecranon bursa. This extends distally along the dorsal aspect of the   forearm to the level of the hand. More mild appearing subcutaneous edema   is seen within the volar subcutaneous fat. Findings are suggestive of   cellulitis. No subcutaneous air.    Chronic healed posttraumatic deformity of the distal humeral diaphysis is   seen. No elbow joint effusion. There is no acute appearing osseous   erosion or destruction to suggest osteomyelitis.    There is severe basilar andscaphotrapezial trapezoidal joint arthrosis.   There is capitate/lunate arthrosis. There is scattered interphalangeal   joint arthrosis. There is a triceps enthesophyte.    IMPRESSION:    Evaluation for fluid collection is limited by lack of intravenous   contrast.    Prominent confluent edema along the olecranon bursa which extends   distally along the dorsal forearm to the level of the hand. No   subcutaneous air.    No CT evidence of osteomyelitis. There is persistent concern for   osteomyelitis correlation with MRI could be performed.    --- End of Report ---            ALYCE SPANN MD; Attending Radiologist  This document has been electronically signed. Oct 23 2023  4:38PM

## 2023-10-23 NOTE — H&P ADULT - HISTORY OF PRESENT ILLNESS
89 yrs old M, from home, A 24/7, pmhx of HTN, HLD, CAD, Afib on Eliquis, BPH, Dementia [AAOx1 at baseline] presented with Rt UE swelling. Pt stated that  89 yrs old M, from home, HHA 24/7, pmhx of HTN, HLD, CAD, Afib on Eliquis, BPH, Dementia [AAOx1 at baseline] presented with Rt UE swelling. Pt was uncooperative at the time of conversation and examination. He stated that the RUE is painful and would not like anyone to touch the arm.   Collateral information obtained from pt's daughter Ms. Siomara Prado, over the phone, who reported of pt having swelling of the RUE, couple of days ago for which he was brought in to ED, he was provided with oral Clindamycin for 6 days which was completed today. However as the swelling did not improve on PO meds, pt was brought in for further therapy. She denied pt having symptoms such as fever, chills, or other symptoms.

## 2023-10-23 NOTE — H&P ADULT - NSHPPHYSICALEXAM_GEN_ALL_CORE
Physical examination was limited as pt was uncooperative.   Chest/Lung: bilateral air entry, no wheeze, no crackles/rhonchi  HEART: regular rate and rhythm, + systolic murmur on the Rt upper sternal border  EXTREMITIES: + swelling on the Rt UE, no difference on the temp compared to LUE, pulses palpable bilaterally,  SKIN: no rash

## 2023-10-23 NOTE — CONSULT NOTE ADULT - ASSESSMENT
cellulitis RUE, failed outpatient oral abx  no evidence of drainable collection clinically or per CT  no crepitus or sign of fascitis    recommend medical service admision for iv abx and ID consult for cellulitis not improved with OP oral abx  ortho consult if limited ROM of elbow presentl  warm compresses elbow and elevation Right upper extremity  will follow as needed in house.

## 2023-10-23 NOTE — CONSULT NOTE ADULT - SUBJECTIVE AND OBJECTIVE BOX
89-year-old man returns to the ER for worsening redness and swelling to the right arm.  Patient was here 5 days ago and had a duplex and x-rays done of the arm which were unremarkable.  Patient leukocytosis at the time and after shared decision making patient was sent home on clindamycin.  Patient's aunt states that the redness seems to have become less prominent however now the swelling has extended to include the wrist and distal forearm whereas before was more in the proximal forearm.  Patient denies any complaints.  Patient's son is a physician and wanted the patient to come for evaluation because he states that the antibiotics seem not to have helped.  Denies distal extremity paresthesias or weakness.    ICU Vital Signs Last 24 Hrs  T(C): 36.5 (23 Oct 2023 15:21), Max: 36.7 (23 Oct 2023 12:18)  T(F): 97.7 (23 Oct 2023 15:21), Max: 98 (23 Oct 2023 12:18)  HR: 82 (23 Oct 2023 15:21) (80 - 82)  BP: 141/81 (23 Oct 2023 15:21) (130/82 - 141/81)  BP(mean): --  ABP: --  ABP(mean): --  RR: 18 (23 Oct 2023 15:21) (18 - 18)  SpO2: 96% (23 Oct 2023 15:21) (95% - 96%)    O2 Parameters below as of 23 Oct 2023 12:18  Patient On (Oxygen Delivery Method): room air        Alert nad  right forearm: small carbuncle distal to olecrenon area, minimal tenderness, scant purulence expressed with manipulation. No crepitus,   no fluctuance. +erythema extending distally.  FROM elbow, wrist and fingers.                          8.9    7.40  )-----------( 241      ( 23 Oct 2023 14:21 )             27.1   10-23    140  |  110<H>  |  34<H>  ----------------------------<  108<H>  4.7   |  21<L>  |  1.60<H>    Ca    8.8      23 Oct 2023 14:21    TPro  6.9  /  Alb  3.3<L>  /  TBili  0.3  /  DBili  x   /  AST  6<L>  /  ALT  17  /  AlkPhos  82  10-23  < from: CT Upper Extremity No Cont, Right (10.23.23 @ 16:07) >  IMPRESSION:    Evaluation for fluid collection is limited by lack of intravenous   contrast.    Prominent confluent edema along the olecranon bursa which extends   distally along the dorsal forearm to the level of the hand. No   subcutaneous air.    No CT evidence of osteomyelitis. There is persistent concern for   osteomyelitis correlation with MRI could be performed.    < end of copied text >

## 2023-10-23 NOTE — H&P ADULT - ATTENDING COMMENTS
89-year-old male with past medical history dementia (AOx1-2 at baseline, on 24/7 HHA), BPH, hyperlipidemia, GERD, CAD s/p CABG, CVA, a-fib on Eliquis, hypertension, presenting with worsening redness and swelling to the right arm. Patient presented to ED 10/18, received doppler US and x-ray of the R arm, unremarkable. Patient was found to have leukocytosis at the time and after shared decision making in the ED, patient was sent home on clindamycin.  Patient's aid (seeing him for 4 years) states that compared to the findings on 10/19 when she last saw him, the redness seems to have become less prominent today however now the swelling has extended to include the wrist and distal forearm whereas before was more in the proximal forearm. The aid sent a photo of the arm to his son, who is a physician, and was asked to take him to ED. Patient denies any complaints, including pain, fever. No change in his home medications since the last admission 1/2023 other than vitamin D recently added.    VS reviewed, normotensive, afebrile, SpO2 normal.  On exam, patient is AOx1 (person), NAD, significantly hard of hearing, NAD. Cardiopulmonary exams unremrkable, abdomen soft, NT/ND. There is erythema and swelling in R arm, from the elbow to wrist. No tenderness elicited. ROM appears normal.    Labs reviewed, CBC with WBC 7.4, hgb 8.9 (unchanged from baseline), coags with prolonged APTT and INR likely 2/2 DOAC, BMP with Cr 1.6, LFT unremarkable.    Assessment and plan  89-year-old male with past medical history BPH, hyperlipidemia, GERD, CAD status post CABG, CVA, atrial fibrillation on Eliquis, hypertension, presenting with worsening redness and swelling to the right arm.    # RUE cellulitis, worsening on home clindamycin treatment  # CAD s/p CABG  # CVA  # A-fib on Eliquis  # HTN  # GERD  # BPH  # Dementia  # Anemia, chronic  - surgery recs appreciated, no intervention recommended  - continue vancomycin, ID consult in AM  - doppler 10/18 negative for DVT  - BCx 10/18 negative, f/u BCx sent today  - continue home medications, including amlodipine, atorvastatin, Eliquis, FLomax, folic acid, isosorbide dinitrate, lasix, paroxetine, toprol  - delirium precautions, give antipsychotics as needed but would minimize the dose   - last C discussion 1/24/2023 with DNR/DNI

## 2023-10-23 NOTE — ED PROVIDER NOTE - PHYSICAL EXAMINATION
Heart regular lungs clear abdomen soft nontender.  Right forearm with erythema and edema.  2+ radial pulse.  Good capillary refill.  The group of 4 pustules noted to the lateral aspect of the right elbow.  When the surrounding area is compressed some purulent and serous drainage is expelled from these pustules.  Patient has full range of motion of the elbow without any limitation from pain.

## 2023-10-24 ENCOUNTER — TRANSCRIPTION ENCOUNTER (OUTPATIENT)
Age: 89
End: 2023-10-24

## 2023-10-24 DIAGNOSIS — N17.9 ACUTE KIDNEY FAILURE, UNSPECIFIED: ICD-10-CM

## 2023-10-24 DIAGNOSIS — M71.9 BURSOPATHY, UNSPECIFIED: ICD-10-CM

## 2023-10-24 DIAGNOSIS — Z02.9 ENCOUNTER FOR ADMINISTRATIVE EXAMINATIONS, UNSPECIFIED: ICD-10-CM

## 2023-10-24 PROCEDURE — 99221 1ST HOSP IP/OBS SF/LOW 40: CPT

## 2023-10-24 RX ORDER — VANCOMYCIN HCL 1 G
750 VIAL (EA) INTRAVENOUS EVERY 24 HOURS
Refills: 0 | Status: DISCONTINUED | OUTPATIENT
Start: 2023-10-24 | End: 2023-10-25

## 2023-10-24 RX ORDER — OLANZAPINE 15 MG/1
1 TABLET, FILM COATED ORAL ONCE
Refills: 0 | Status: COMPLETED | OUTPATIENT
Start: 2023-10-24 | End: 2023-10-24

## 2023-10-24 RX ORDER — COLLAGENASE CLOSTRIDIUM HIST. 250 UNIT/G
1 OINTMENT (GRAM) TOPICAL DAILY
Refills: 0 | Status: DISCONTINUED | OUTPATIENT
Start: 2023-10-24 | End: 2023-10-25

## 2023-10-24 RX ORDER — HALOPERIDOL DECANOATE 100 MG/ML
1 INJECTION INTRAMUSCULAR ONCE
Refills: 0 | Status: DISCONTINUED | OUTPATIENT
Start: 2023-10-24 | End: 2023-10-24

## 2023-10-24 RX ORDER — OLANZAPINE 15 MG/1
2.5 TABLET, FILM COATED ORAL ONCE
Refills: 0 | Status: COMPLETED | OUTPATIENT
Start: 2023-10-24 | End: 2023-10-24

## 2023-10-24 RX ADMIN — Medication 12.5 MILLIGRAM(S): at 05:52

## 2023-10-24 RX ADMIN — ISOSORBIDE DINITRATE 10 MILLIGRAM(S): 5 TABLET ORAL at 05:52

## 2023-10-24 RX ADMIN — OLANZAPINE 2.5 MILLIGRAM(S): 15 TABLET, FILM COATED ORAL at 21:10

## 2023-10-24 RX ADMIN — Medication 250 MILLIGRAM(S): at 17:50

## 2023-10-24 RX ADMIN — OLANZAPINE 1 MILLIGRAM(S): 15 TABLET, FILM COATED ORAL at 13:53

## 2023-10-24 RX ADMIN — Medication 250 MILLIGRAM(S): at 05:51

## 2023-10-24 RX ADMIN — APIXABAN 2.5 MILLIGRAM(S): 2.5 TABLET, FILM COATED ORAL at 05:52

## 2023-10-24 RX ADMIN — AMLODIPINE BESYLATE 2.5 MILLIGRAM(S): 2.5 TABLET ORAL at 05:52

## 2023-10-24 NOTE — PROGRESS NOTE ADULT - PROBLEM SELECTOR PLAN 9
2 y/o M w/ no significant PMHx presents to ED c/o fever x 2 days. Tmax 102. Mom gave Motrin last night. Sickness began 2 weeks ago with cough, congestion, and fever. Fever resolved, however cough and congestion have lingered. Yesterday pt developed fever and had 2 episodes of vomiting. Today pt had 1 episode of NBNB vomiting. Voiding normally, no diarrhea. - Pt is from home with [facility] 24/7  - No PT needed, pt is at baseline  -  once pt is clinically stable for d/c can transition to doxycycline 100 mg q12 hrs PO with meals and seat up for 30 min post ingestion for total 14 days. 4 y/o M without significant PMHx presents to ED c/o fever x 2 days. Tmax 102. Mom gave Motrin last night. Sickness began 2 weeks ago with cough, congestion, and fever. Fever resolved, however cough and congestion have lingered. Yesterday pt developed fever and had 2 episodes of vomiting. Today pt had 1 episode of NBNB vomiting. Voiding normally, no diarrhea.

## 2023-10-24 NOTE — PATIENT PROFILE ADULT - FALL HARM RISK - HARM RISK INTERVENTIONS

## 2023-10-24 NOTE — DISCHARGE NOTE PROVIDER - NSDCCPCAREPLAN_GEN_ALL_CORE_FT
PRINCIPAL DISCHARGE DIAGNOSIS  Diagnosis: Cellulitis  Assessment and Plan of Treatment: - You presented with right upper extremity swelling and redness, concerning for cellulitis  - You failed out/patient oral antibiotics without any improvement  - Cat scan of your right arm was neagtive for bone infection  - You were treated with IV antibiotics under the care of an infection disease doctor.  - Your are advised to continue to take antibitics until ???????????????      SECONDARY DISCHARGE DIAGNOSES  Diagnosis: Bursitis  Assessment and Plan of Treatment: - Your cat scan of your arm was concerning for Bursitis which means the inflammation of the fluid-filled pads that acts as cussions at the joints.  - You were evaluated by the orthopedic team  - They recommended ???????????????    Diagnosis: Chronic atrial fibrillation  Assessment and Plan of Treatment: - Continue to take your medication Eliquis for your history of Afib  Atrial fibrillation is the most common heart rhythm problem & has the risk of stroke & heart attack  It helps if you control your blood pressure, not drink more than 1-2 alcohol drinks per day, cut down on caffeine, getting treatment for over active thyroid gland, & getting exercise  Call your doctor if you feel your heart racing or beating unusually, chest tightness or pain, lightheaded, faint, shortness of breath especially with exercise  It is important to take your heart medication as prescribed  You may be on anticoagulation which is very important to take as directed - you may need blood work to monitor drug levels      Diagnosis: HTN (hypertension)  Assessment and Plan of Treatment: - You have a history of high blood pressure  - Continue to take your medications Amlodipine & Isosorbide as prescribed  - Maintain Low salt diet  - Engage in activity as tolerated.  - Take all medication as prescribed.  - Follow up with your medical doctor for routine blood pressure monitoring at your next visit.  - Notify your doctor if you have any of the following symptoms:   Dizziness, Lightheadedness, Blurry vision, Headache, Chest pain, Shortness of breath      Diagnosis: NARINDER (acute kidney injury)  Assessment and Plan of Treatment: - You were also noted with abnormal kidney function lvel known as Acute Kidney Injury (NARINDER)  - This may likely due to poor oral intake.  - You were given IV fluids, & your kidney function is improving slowly  - Avoid taking (NSAIDs) - (ex: Ibuprofen, Advil, Celebrex, Naprosyn)  - Avoid taking any nephrotoxic agents (can harm kidneys) - Intravenous contrast for diagnostic testing, combination cold medications.  - Have all medications adjusted for your renal function by your Health Care Provider.  Blood pressure control is important.  Take all medication as prescribed.       PRINCIPAL DISCHARGE DIAGNOSIS  Diagnosis: Cellulitis  Assessment and Plan of Treatment: - You presented with right upper extremity swelling and redness, concerning for cellulitis  - You failed out/patient oral antibiotics without any improvement  - CT scan of your right arm was neagtive for bone infection  - You were treated with IV antibiotics under the care of an infection disease doctor.  - Your are advised to continue to take antibitics until Nov 7.      SECONDARY DISCHARGE DIAGNOSES  Diagnosis: Bursitis  Assessment and Plan of Treatment: - Your cat scan of your arm was concerning for possible bursitis which means the inflammation of the fluid-filled pads that acts as cussions at the joints.    Diagnosis: NARINDER (acute kidney injury)  Assessment and Plan of Treatment: - You were also noted with abnormal kidney function level known as Acute Kidney Injury (NARINDER)  - This may likely due to poor oral intake.  - You were given IV fluids, & your kidney function is improving slowly  - Avoid taking (NSAIDs) - (ex: Ibuprofen, Advil, Celebrex, Naprosyn)  - Avoid taking any nephrotoxic agents (can harm kidneys) - Intravenous contrast for diagnostic testing, combination cold medications.  - Have all medications adjusted for your renal function by your Health Care Provider.  Blood pressure control is important.  Take all medication as prescribed.      Diagnosis: Chronic atrial fibrillation  Assessment and Plan of Treatment: - Continue to take your medication Eliquis for your history of Afib  Atrial fibrillation is the most common heart rhythm problem & has the risk of stroke & heart attack  It helps if you control your blood pressure, not drink more than 1-2 alcohol drinks per day, cut down on caffeine, getting treatment for over active thyroid gland, & getting exercise  Call your doctor if you feel your heart racing or beating unusually, chest tightness or pain, lightheaded, faint, shortness of breath especially with exercise  It is important to take your heart medication as prescribed      Diagnosis: HTN (hypertension)  Assessment and Plan of Treatment: - You have a history of high blood pressure  - Continue to take your medications Amlodipine & Isosorbide as prescribed  - Maintain Low salt diet  - Engage in activity as tolerated.  - Take all medication as prescribed.  - Follow up with your medical doctor for routine blood pressure monitoring at your next visit.  - Notify your doctor if you have any of the following symptoms:   Dizziness, Lightheadedness, Blurry vision, Headache, Chest pain, Shortness of breath

## 2023-10-24 NOTE — PATIENT PROFILE ADULT - FALL HARM RISK - FACTORS NURSING JUDGEMENT
Department of Physical Medicine & Rehabilitation  Progress Note    Patient Identification:  Monica Bhakta  [id number]  : 1954  Admit date: 2022    Chief Complaint: S/P excision of acoustic neuroma    Subjective:   She did not sleep well last night and she thinks its due to the mattress. Working hard in therapy. No new complaints overnight. Participating well in therapy. Seen in her chair this morning. ROS: No f/c, n/v, cp     Objective:  Patient Vitals for the past 24 hrs:   BP Temp Temp src Pulse Resp SpO2   22 0736 130/86 97.6 °F (36.4 °C) Oral 86 16 95 %   22 2000 133/70 97.7 °F (36.5 °C) Oral 74 16 96 %       Const: Alert. No distress, pleasant. HEENT: right sided swelling- wound care  CV: Regular rate and rhythm. Resp: No respiratory distress. Lungs CTAB. Abd: Soft, nontender, nondistended, NABS+   Ext: No edema. Neuro: Alert, oriented, appropriately interactive. Psych: Cooperative, appropriate mood and affect    Laboratory data: Available via EMR.    Last 24 hour lab  Recent Results (from the past 24 hour(s))   POCT Glucose    Collection Time: 22 12:08 PM   Result Value Ref Range    POC Glucose 110 (H) 70 - 99 mg/dl    Performed on ACCU-CHEK    POCT Glucose    Collection Time: 22  4:52 PM   Result Value Ref Range    POC Glucose 114 (H) 70 - 99 mg/dl    Performed on ACCU-CHEK    POCT Glucose    Collection Time: 22  8:18 PM   Result Value Ref Range    POC Glucose 137 (H) 70 - 99 mg/dl    Performed on ACCU-CHEK    Basic Metabolic Panel w/ Reflex to MG    Collection Time: 22  6:10 AM   Result Value Ref Range    Sodium 141 136 - 145 mmol/L    Potassium reflex Magnesium 4.0 3.5 - 5.1 mmol/L    Chloride 106 99 - 110 mmol/L    CO2 27 21 - 32 mmol/L    Anion Gap 8 3 - 16    Glucose 127 (H) 70 - 99 mg/dL    BUN 10 7 - 20 mg/dL    Creatinine <0.5 (L) 0.6 - 1.2 mg/dL    Est, Glom Filt Rate >60 >60    Calcium 8.8 8.3 - 10.6 mg/dL   CBC auto differential Collection Time: 12/26/22  6:10 AM   Result Value Ref Range    WBC 2.9 (L) 4.0 - 11.0 K/uL    RBC 3.04 (L) 4.00 - 5.20 M/uL    Hemoglobin 9.1 (L) 12.0 - 16.0 g/dL    Hematocrit 28.1 (L) 36.0 - 48.0 %    MCV 92.5 80.0 - 100.0 fL    MCH 29.9 26.0 - 34.0 pg    MCHC 32.3 31.0 - 36.0 g/dL    RDW 18.1 (H) 12.4 - 15.4 %    Platelets 014 207 - 696 K/uL    MPV 8.2 5.0 - 10.5 fL    Neutrophils % 47.2 %    Lymphocytes % 26.0 %    Monocytes % 11.3 %    Eosinophils % 14.5 %    Basophils % 1.0 %    Neutrophils Absolute 1.4 (L) 1.7 - 7.7 K/uL    Lymphocytes Absolute 0.7 (L) 1.0 - 5.1 K/uL    Monocytes Absolute 0.3 0.0 - 1.3 K/uL    Eosinophils Absolute 0.4 0.0 - 0.6 K/uL    Basophils Absolute 0.0 0.0 - 0.2 K/uL   POCT Glucose    Collection Time: 12/26/22  8:31 AM   Result Value Ref Range    POC Glucose 162 (H) 70 - 99 mg/dl    Performed on ACCU-CHEK        Therapy progress:  PT  Position Activity Restriction  Other position/activity restrictions: Ambulate the Patient, Up with assistance, up in the chair  Objective     Sit to Stand: Minimal Assistance (To RW from [facility] and wheelchair)  Stand to Sit: Minimal Assistance  Device: Rolling Walker  Other Apparatus:  (wheelchair follow)  Assistance: Minimal assistance  Distance: 10ftx2+50ft  OT  PT Equipment Recommendations  Equipment Needed: No  Other: plan to continue to assess  Toilet - Technique: Ambulating  Equipment Used: Extra wide bedside commode  Toilet Transfers Comments: pt reported difficulty standing from standard low surface toilet; + time to transition BUEs to RW  Assessment        SLP          Body mass index is 56.91 kg/m².     Assessment and Plan:  Giant Duodenal Ulcer  -monitor Hgb  -Transfuse pRBCs  -Protonix 40 mg BID  -Embolization of GDA  -GI following  -IR following     BL PE, suspected  -currently on heparin gtt  - transition to xarelto     Meningioma  -s/p resection with TL and MCF approach  -Keppra 500 mg BID  -Bowel regiment: Senna, glycolax, and dulcolax  -Nicardipine-Roxicodone PRN  -Acycolvir 400 mg TID for 10 days  -Artifical tears q2H and eye patch  -Keep an eye on mastoid dressing  -SCDs for DVT prophylaxis   -PT/OT     HTN   -Coreg 12.5 mg BID  Losartan 100 mg daily     DM   -Lantus 15u qHS  -HDSSI     Hyperthyroidism   -Methimazole 10 mg daily     Liver Mass  -Incidental finding  -f/u imaging once acute problems have been tx      Bed issues- requires a bariatric mattress unfortunately     Rehab Progress: Improving  Anticipated Dispo: home  Services/DME: [facility]  ELOS: LUCIUS Pastor D.O. M.P.H  PM&R  12/26/2022  11:15 AM Yes

## 2023-10-24 NOTE — ADVANCED PRACTICE NURSE CONSULT - RECOMMEDATIONS
-Clean all wounds with normal saline and apply skin prep to the surrounding skin  -Apply Collagense ointment to the slough areas of the wound bed, apply saline moistened gauze to the wound bed, and cover with a foam dressing Daily PRN  -Apply a Foam dressing to the Coccyx area Q 72hrs PRN  -Elevate/float the patients heels using heel protectors and reposition the patient Q 2hrs using wedges or pillows

## 2023-10-24 NOTE — PROGRESS NOTE ADULT - PROBLEM SELECTOR PLAN 6
hx of HLD on Atorvastatin  c/w home meds hx of HTN on Amlodipine 2.5 mg & Isosorbide 20mg BID  c/w home meds Amlodipine 2.5 mg daily with holding parameters  Start Isosorbide 10 mg BID

## 2023-10-24 NOTE — DISCHARGE NOTE PROVIDER - CARE PROVIDER_API CALL
ROGER House call,   ROGER House call  [phone]  Phone: (   )    -  Fax: (   )    -  Follow Up Time:

## 2023-10-24 NOTE — DISCHARGE NOTE PROVIDER - NSDCMRMEDTOKEN_GEN_ALL_CORE_FT
acetaminophen 325 mg oral tablet: 2 tab(s) orally every 6 hours, As needed, Moderate Pain (4 - 6)  atorvastatin 40 mg oral tablet: 1 tab(s) orally once a day  B-12 1000 mcg oral tablet: 1 tab(s) orally once a day   Eliquis 2.5 mg oral tablet: 1 tab(s) orally 2 times a day   Flomax 0.4 mg oral capsule: 1 cap(s) orally once a day  isosorbide dinitrate 20 mg oral tablet: 1 tab(s) orally 2 times   Norvasc 2.5 mg oral tablet: 1 tab(s) orally once a day  PARoxetine 10 mg oral tablet: 1 tab(s) orally once a day  Toprol-XL 25 mg oral tablet, extended release: 1 tab(s) orally once a day   acetaminophen 325 mg oral tablet: 2 tab(s) orally every 6 hours, As needed, Moderate Pain (4 - 6)  atorvastatin 40 mg oral tablet: 1 tab(s) orally once a day  B-12 1000 mcg oral tablet: 1 tab(s) orally once a day   doxycycline hyclate 100 mg oral tablet: 1 tab(s) orally 2 times a day  Eliquis 2.5 mg oral tablet: 1 tab(s) orally 2 times a day   Flomax 0.4 mg oral capsule: 1 cap(s) orally once a day  isosorbide dinitrate 20 mg oral tablet: 1 tab(s) orally 2 times   Norvasc 2.5 mg oral tablet: 1 tab(s) orally once a day  PARoxetine 10 mg oral tablet: 1 tab(s) orally once a day  Toprol-XL 25 mg oral tablet, extended release: 1 tab(s) orally once a day

## 2023-10-24 NOTE — PROGRESS NOTE ADULT - ATTENDING COMMENTS
Patient was seen and examined at bedside. Reports pain in right elbow but does not want to get treatment in hospital. Wants to go home     ICU Vital Signs Last 24 Hrs  T(C): 36.6 (24 Oct 2023 05:26), Max: 36.6 (24 Oct 2023 05:26)  T(F): 97.8 (24 Oct 2023 05:26), Max: 97.8 (24 Oct 2023 05:26)  HR: 84 (24 Oct 2023 05:26) (71 - 92)  BP: 117/60 (24 Oct 2023 05:26) (101/43 - 141/81)  BP(mean): 61 (24 Oct 2023 01:10) (61 - 61)  ABP: --  ABP(mean): --  RR: 18 (24 Oct 2023 05:26) (18 - 18)  SpO2: 97% (24 Oct 2023 05:26) (96% - 97%)    O2 Parameters below as of 24 Oct 2023 05:26  Patient On (Oxygen Delivery Method): room air      P/E:  NAD  AAOx2, hard of hearing, does not follow all commands to complete full neuro exam but moves all 4 extremities, was able to sit on bedside chair with assistance   CTABL  S1S2 WNL  Abd soft, non tender, BS present   Right elbow erythematous, swollen, tender with small opening having yellowish discharge. Erythema spreads to forearm. 2+ radial pulse   BLLE no edema or calf tenderness     I reviewed CBC, BMP, Mg, Phos, LFTs, and culture results.   I ordered repeat CBC, BMP, CRP, ESR, Vanc Trough and formulated the plan as below    Discussed with Dr. Couch    A/P:  Cellulitis of right elbow with possible olecranon bursitis   NARINDER  Chronic Afib on Eliquis   HTN  HLD  CAD  Dementia     Plan:   CT scan noted, WBC trending down, Appreciate ID consult. Clinically improving. Will decrease vanco dose as per ID, can dc on Doxycycline. Follow up blood cultures  Patient will refusing blood work now, will obtain ESR, CRP, Vanco trough with am labs   Ortho consult pending   Monitor renal function   Avoid anticholinergics, benzodiazepines, limit lines/tubes/tethers/restraints, encourage frequent reorientation/reassurance by staff, encourage good sleep hygiene, adequate lighting  OOB to chair   Cont current BP meds   Cont home meds for dementia  Cont home meds for BPH  Cont BB and Eliquis for Afib  Rest of the management as above

## 2023-10-24 NOTE — PROGRESS NOTE ADULT - PROBLEM SELECTOR PLAN 5
hx of HTN on Amlodipine 2.5 mg & Isosorbide 20mg BID  c/w home meds Amlodipine 2.5 mg daily with holding parameters  Start Isosorbide 10 mg BID hx of Dementia baseline AAOx1  uncooperative and combative   will give IM Zyprexa 2.5 mg once  c/w home med Paroxetine daily  ECG on 10/18

## 2023-10-24 NOTE — PROGRESS NOTE ADULT - PROBLEM SELECTOR PLAN 1
hx of RUE swelling, dx with cellulitis completed PO Clindamycin without improvement  In ED: afebrile, vitally stable  No leukocytosis   s/p Cefepime in ED  CT scan showed edema, no subcut gas, no osteomyelitis  Surgery consulted, recommended medical management   c/w Vancomycin 750mg   F/U Vanco pre 3rd dose  f/u blood cultures, ESR (uncooperative and combative) Refusing lab work  once clinically stable for d/c can transition to doxycycline 100 mg q12 hrs PO with meals and seat up for 30 min post ingestion for total 14 days.   ID consulted Dr. Couch

## 2023-10-24 NOTE — PROGRESS NOTE ADULT - PROBLEM SELECTOR PLAN 3
hx of Afib on Eliquis 2.5 mg BID and Metoprolol 25 mg   c/w Eliquis and Metoprolol tartrate 12.5 mg BID with holding parameters B/W Scr 1.60  Baseline 1 year ago 1.24  Likely 2/2 poor po intake  Avoid nephrtoxins  Encourage oral fluids

## 2023-10-24 NOTE — DISCHARGE NOTE PROVIDER - HOSPITAL COURSE
89 yrs old M, from home, HHA 24/7, pmhx of HTN, HLD, CAD, Afib on Eliquis, BPH, Dementia [AAOx1 at baseline]   presented with Rt UE swelling. Completed out/patient PO Clindamycin without improvement.   Pt is admitted for RUE cellulitis. CT LUE shows no evidence of osteomyelitis. Started on Vanco. ID consulted.   In ED: afebrile, vitally stable, No leukocytosis. Surgery consulted, recommended medical management   f/u blood cultures, ESR (uncooperative and combative) Refusing lab work. ID Dr. Couch consulted.  ID recommends on d/c to transition to doxycycline 100 mg q12 hrs PO with meals and seat up for   30 min post ingestion for total 14 days.   Off note, CT RUE: Prominent confluent edema along the olecranon bursa which extends   distally along the dorsal forearm to the level of the hand. No subcutaneous air. Ortho consulted,   recommends ???????????????    Given patient's improved clinical status and current hemodynamic stability, decision was made to discharge.  Please refer to patient's complete medical chart with documents for a full hospital course, for this is only a brief summary.   89 yrs old M, from home, HHA 24/7, pmhx of HTN, HLD, CAD, Afib on Eliquis, BPH, Dementia [AAOx1 at baseline]   presented with Rt UE swelling. Completed out/patient PO Clindamycin without improvement.   Pt is admitted for RUE cellulitis. CT LUE shows no evidence of osteomyelitis. Started on Vanco. ID consulted.   In ED: afebrile, vitally stable, No leukocytosis. Surgery consulted, recommended medical management   f/u blood cultures, ESR (uncooperative and combative) Refusing lab work. ID Dr. Couch consulted.  ID recommends on d/c to transition to doxycycline 100 mg q12 hrs PO with meals and seat up for   30 min post ingestion for total 14 days.   Off note, CT RUE: Prominent confluent edema along the olecranon bursa which extends   distally along the dorsal forearm to the level of the hand. No subcutaneous air. Ortho consulted,       Given patient's improved clinical status and current hemodynamic stability, decision was made to discharge.  Please refer to patient's complete medical chart with documents for a full hospital course, for this is only a brief summary.

## 2023-10-24 NOTE — PROGRESS NOTE ADULT - PROBLEM SELECTOR PLAN 2
CT RUE: Prominent confluent edema along the olecranon bursa which extends   distally along the dorsal forearm to the level of the hand. No subcutaneous air.  - Ortho consulted

## 2023-10-24 NOTE — DISCHARGE NOTE PROVIDER - ATTENDING DISCHARGE PHYSICAL EXAMINATION:
patient was seen and examined at bedside. Mildly agitated but can be reoriented. Last night a dose of Zyprexa was given to control behaviour for safety as patient was coming out of bed while a fall risk    ICU Vital Signs Last 24 Hrs  T(C): 36.3 (25 Oct 2023 05:24), Max: 36.6 (24 Oct 2023 21:23)  T(F): 97.3 (25 Oct 2023 05:24), Max: 97.9 (24 Oct 2023 21:23)  HR: 79 (25 Oct 2023 05:24) (76 - 79)  BP: 161/67 (25 Oct 2023 05:24) (142/64 - 161/67)  BP(mean): --  ABP: --  ABP(mean): --  RR: 18 (25 Oct 2023 05:24) (18 - 18)  SpO2: 96% (25 Oct 2023 05:24) (96% - 98%)    O2 Parameters below as of 25 Oct 2023 05:24  Patient On (Oxygen Delivery Method): room air    P/E:  NAD  AAOx2, no new focal deficit   CTABL  S1S2 WNL  Abd soft, non tender, BS present   Right elbow erythema same. Swelling and tenderness has markedly improved. NO active discharge. Distal pulses palpable     Labs noted     A/P:  Cellulitis improved. Appreciate consult from surgery, no collection to be drained. Will discharge on Doxycycline. Spoke with daughter and advised to keep patient upright until 30mins after the pill. Also advised to swallow pill with plenty of water. Advised to return to ER if symptoms worsens. Explained the need for Zyprexa last night and daughter understands. Left  for house call. NP  Stable to be discharged. Discussed with patient's family.        patient was seen and examined at bedside. Mildly agitated but can be reoriented. Last night a dose of Zyprexa was given to control behaviour for safety as patient was coming out of bed while a fall risk    ICU Vital Signs Last 24 Hrs  T(C): 36.3 (25 Oct 2023 05:24), Max: 36.6 (24 Oct 2023 21:23)  T(F): 97.3 (25 Oct 2023 05:24), Max: 97.9 (24 Oct 2023 21:23)  HR: 79 (25 Oct 2023 05:24) (76 - 79)  BP: 161/67 (25 Oct 2023 05:24) (142/64 - 161/67)  BP(mean): --  ABP: --  ABP(mean): --  RR: 18 (25 Oct 2023 05:24) (18 - 18)  SpO2: 96% (25 Oct 2023 05:24) (96% - 98%)    O2 Parameters below as of 25 Oct 2023 05:24  Patient On (Oxygen Delivery Method): room air    P/E:  NAD  AAOx2, no new focal deficit   CTABL  S1S2 WNL  Abd soft, non tender, BS present   Right elbow erythema same. Swelling and tenderness has markedly improved. NO active discharge. Distal pulses palpable     Labs noted     A/P:  Cellulitis improved. Appreciate consult from surgery, no collection to be drained. Will discharge on Doxycycline. Spoke with daughter Siomara Prado and advised to keep patient upright until 30mins after the pill. Also advised to swallow pill with plenty of water. Advised to return to ER if symptoms worsens. Explained the need for Zyprexa last night and daughter understands. Left  for house call. NP  Stable to be discharged. Discussed with patient's family.

## 2023-10-24 NOTE — DISCHARGE NOTE PROVIDER - PROVIDER TOKENS
FREE:[LAST:[[facility] call],PHONE:[(   )    -],FAX:[(   )    -],ADDRESS:[[facility]  [phone]]]

## 2023-10-24 NOTE — PROGRESS NOTE ADULT - PROBLEM SELECTOR PLAN 8
- Pt is from home with [facility] 24/7  - No PT needed, pt is at baseline  -  once pt is clinically stable for d/c can transition to doxycycline 100 mg q12 hrs PO with meals and seat up for 30 min post ingestion for total 14 days. DVTPPX:  c/w home med Eliquis for hx Afib

## 2023-10-24 NOTE — ADVANCED PRACTICE NURSE CONSULT - ASSESSMENT
This is a 89yr old male patient admitted for Cellulitis, presenting with a R. Elbow Abscess (1cm x 0.6cm x 0.2cm) with slough, pink tissue, purulent drainage, surrounding tissue edema & erythema.

## 2023-10-24 NOTE — CONSULT NOTE ADULT - ASSESSMENT
HPI:  89 yrs old M, from home, A 24/7, pmhx of HTN, HLD, CAD, Afib on Eliquis, BPH, Dementia [AAOx1 at baseline] presented with Rt UE swelling. He presented to the ED on 10/18 with complains of R elbow pain .Collateral information obtained from ED and admission records since pt is demented, he presented for swelling of the RUE ~ 10/18 for which he was brought in to ED, he was provided with oral Clindamycin however as the swelling and pain persisted, pt was brought in for further therapy. Non toxic, no systemic symptoms, afebrile, no chills, no N/V diarrhea , no skin rash.  (23 Oct 2023 19:06)    Allergies: No Known Allergies    PAST MEDICAL & SURGICAL HISTORY:  Coronary artery disease  H/O: hypertension  History of atrial fibrillation  S/P CABG (coronary artery bypass graft)  History of coronary artery bypass graft    SOCIAL HISTORY: No smoking ;no alcohol abuse, no IVDU  FAMILY HISTORY: no pertinent related medical history    REVIEW OF SYSTEMS: unable to obtain (dementia)    PHYSICAL EXAM:  VITALS: Vital Signs Last 24 Hrs  T(C): 36.6 (24 Oct 2023 05:26), Max: 36.7 (23 Oct 2023 12:18)  T(F): 97.8 (24 Oct 2023 05:26), Max: 98 (23 Oct 2023 12:18)  HR: 84 (24 Oct 2023 05:26) (71 - 92)  BP: 117/60 (24 Oct 2023 05:26) (101/43 - 141/81)  BP(mean): 61 (24 Oct 2023 01:10) (61 - 61)  RR: 18 (24 Oct 2023 05:26) (18 - 18)  SpO2: 97% (24 Oct 2023 05:26) (95% - 97%)    Parameters below as of 24 Oct 2023 05:26 Patient On (Oxygen Delivery Method): room air  Pt uncooperative and aggressive when attempted to examin  Gen: AOx1-2, NAD, non-toxic, pleasant  HEAD:  Atraumatic, Normocephalic  EYES: PERRLA, conjunctiva and sclera clear  ENT: Moist mucous membranes  NECK: Supple, No JVD  CV: S1+S2 normal, no murmurs   Resp: Clear bilat, no resp distress, no crackles/wheezes  Abd: Soft, nontender, +BS  Ext: No LE edema, no cyanosis, LE pulses present  : no dysuria, no gross hematuria, Glover (+/-)  IV/Skin: No thrombophlebitis, no skin rash  Msk: R elbow with small area of erythema and mild purulent discharge, no fluctuation, no significant induration, edema, no restriction on ROM.   Neuro: AAOx1-2 demented. No sensory deficits, no motor deficits  Psych: no anxiety, no delusional ideas, no suicidal ideation    LABS/DIAGNOSTIC TESTS:                        8.9    7.40  )-----------( 241      ( 23 Oct 2023 14:21 )             27.1     WBC Count: 7.40 K/uL (10-23 @ 14:21)    10-23    140  |  110<H>  |  34<H>  ----------------------------<  108<H>  4.7   |  21<L>  |  1.60<H>    Ca    8.8      23 Oct 2023 14:21    TPro  6.9  /  Alb  3.3<L>  /  TBili  0.3  /  DBili  x   /  AST  6<L>  /  ALT  17  /  AlkPhos  82  10-23    CULTURES:   Culture - Blood (collected 10-18-23 @ 17:05)  Source: .Blood Blood-Peripheral  Final Report (10-23-23 @ 22:00):    No growth at 5 days    Culture - Blood (collected 10-18-23 @ 16:45)  Source: .Blood Blood-Peripheral  Final Report (10-23-23 @ 22:00):    No growth at 5 days    RADIOLOGY:   < from: CT Upper Extremity No Cont, Right (10.23.23 @ 16:07) >  Evaluation for fluid collection is limited by the lack of intravenous   contrast. There is confluent subcutaneous edema within the region of the   olecranon bursa. This extends distally along the dorsal aspect of the   forearm to the level of the hand. More mild appearing subcutaneous edema   is seen within the volar subcutaneous fat. Findings are suggestive of   cellulitis. No subcutaneous air.    Chronic healed posttraumatic deformity of the distal humeral diaphysis is   seen. No elbow joint effusion. There is no acute appearing osseous   erosion or destruction to suggest osteomyelitis.    There is severe basilar andscaphotrapezial trapezoidal joint arthrosis.   There is capitate/lunate arthrosis. There is scattered interphalangeal   joint arthrosis. There is a triceps enthesophyte.    IMPRESSION:  Evaluation for fluid collection is limited by lack of intravenous contrast.  Prominent confluent edema along the olecranon bursa which extends distally along the dorsal forearm to the level of the hand. No subcutaneous air.    ANTIBIOTICS:  vancomycin  IVPB 1000 every 12 hours    IMPRESSION:  89 yrs old M, from home, HHA 24/7, pmhx of HTN, HLD, CAD, Afib on Eliquis, BPH, Dementia [AAOx1 at baseline] who came in 10/18 for R elbow cellulitis, afebrile, non toxic, he was sent home on Clindamycin, as per records pt is returning due to persistent pain and swelling however the teams are having difficulty examining him since he becomes aggressive, I did with the help of a nursing Aid and it turns that the pt is very strong with no ROM limitation there is a small erythematous area with some scant yellowish secretion, there is no fluctuation, I think the pain reaction that the pt expresses is not proportionate to his current affected area.  WBC 14 K on 10/18 , no leukocytosis currently.   Blood cx 10/18 NGTD  No ESR and CRP available, pt refuses cultures.  CT RLE reviewed with no OM or bone erosions.   Surgery consulted, recommending conservative approach     -SSTI- R elbow cellulitis  -Advanced dementia  -CKD     PLAN:  Continue IV vancomycin inpatient 750 mg q24 hrs, check trough before 3rd dose  ESR and CRP  Follow up cultures  Wound care    Please reach ID with any questions or concerns  Dr. Eusebia Gutierrez  Available in Teams               HPI:  89 yrs old M, from home, A 24/7, pmhx of HTN, HLD, CAD, Afib on Eliquis, BPH, Dementia [AAOx1 at baseline] presented with Rt UE swelling. He presented to the ED on 10/18 with complains of R elbow pain .Collateral information obtained from ED and admission records since pt is demented, he presented for swelling of the RUE ~ 10/18 for which he was brought in to ED, he was provided with oral Clindamycin however as the swelling and pain persisted, pt was brought in for further therapy. Non toxic, no systemic symptoms, afebrile, no chills, no N/V diarrhea , no skin rash.  (23 Oct 2023 19:06)    Allergies: No Known Allergies    PAST MEDICAL & SURGICAL HISTORY:  Coronary artery disease  H/O: hypertension  History of atrial fibrillation  S/P CABG (coronary artery bypass graft)  History of coronary artery bypass graft    SOCIAL HISTORY: No smoking ;no alcohol abuse, no IVDU  FAMILY HISTORY: no pertinent related medical history    REVIEW OF SYSTEMS: unable to obtain (dementia)    PHYSICAL EXAM:  VITALS: Vital Signs Last 24 Hrs  T(C): 36.6 (24 Oct 2023 05:26), Max: 36.7 (23 Oct 2023 12:18)  T(F): 97.8 (24 Oct 2023 05:26), Max: 98 (23 Oct 2023 12:18)  HR: 84 (24 Oct 2023 05:26) (71 - 92)  BP: 117/60 (24 Oct 2023 05:26) (101/43 - 141/81)  BP(mean): 61 (24 Oct 2023 01:10) (61 - 61)  RR: 18 (24 Oct 2023 05:26) (18 - 18)  SpO2: 97% (24 Oct 2023 05:26) (95% - 97%)    Parameters below as of 24 Oct 2023 05:26 Patient On (Oxygen Delivery Method): room air  Pt uncooperative and aggressive when attempted to examin  Gen: AOx1-2, NAD, non-toxic, pleasant  HEAD:  Atraumatic, Normocephalic  EYES: PERRLA, conjunctiva and sclera clear  ENT: Moist mucous membranes  NECK: Supple, No JVD  CV: S1+S2 normal, no murmurs   Resp: Clear bilat, no resp distress, no crackles/wheezes  Abd: Soft, nontender, +BS  Ext: No LE edema, no cyanosis, LE pulses present  : no dysuria, no gross hematuria, Glover (+/-)  IV/Skin: No thrombophlebitis, no skin rash  Msk: R elbow with small area of erythema and mild purulent discharge, no fluctuation, no significant induration, edema, no restriction on ROM.   Neuro: AAOx1-2 demented. No sensory deficits, no motor deficits  Psych: no anxiety, no delusional ideas, no suicidal ideation    LABS/DIAGNOSTIC TESTS:                        8.9    7.40  )-----------( 241      ( 23 Oct 2023 14:21 )             27.1     WBC Count: 7.40 K/uL (10-23 @ 14:21)    10-23    140  |  110<H>  |  34<H>  ----------------------------<  108<H>  4.7   |  21<L>  |  1.60<H>    Ca    8.8      23 Oct 2023 14:21    TPro  6.9  /  Alb  3.3<L>  /  TBili  0.3  /  DBili  x   /  AST  6<L>  /  ALT  17  /  AlkPhos  82  10-23    CULTURES:   Culture - Blood (collected 10-18-23 @ 17:05)  Source: .Blood Blood-Peripheral  Final Report (10-23-23 @ 22:00):    No growth at 5 days    Culture - Blood (collected 10-18-23 @ 16:45)  Source: .Blood Blood-Peripheral  Final Report (10-23-23 @ 22:00):    No growth at 5 days    RADIOLOGY:   < from: CT Upper Extremity No Cont, Right (10.23.23 @ 16:07) >  Evaluation for fluid collection is limited by the lack of intravenous   contrast. There is confluent subcutaneous edema within the region of the   olecranon bursa. This extends distally along the dorsal aspect of the   forearm to the level of the hand. More mild appearing subcutaneous edema   is seen within the volar subcutaneous fat. Findings are suggestive of   cellulitis. No subcutaneous air.    Chronic healed posttraumatic deformity of the distal humeral diaphysis is   seen. No elbow joint effusion. There is no acute appearing osseous   erosion or destruction to suggest osteomyelitis.    There is severe basilar andscaphotrapezial trapezoidal joint arthrosis.   There is capitate/lunate arthrosis. There is scattered interphalangeal   joint arthrosis. There is a triceps enthesophyte.    IMPRESSION:  Evaluation for fluid collection is limited by lack of intravenous contrast.  Prominent confluent edema along the olecranon bursa which extends distally along the dorsal forearm to the level of the hand. No subcutaneous air.    ANTIBIOTICS:  vancomycin  IVPB 1000 every 12 hours    IMPRESSION:  89 yrs old M, from home, HHA 24/7, pmhx of HTN, HLD, CAD, Afib on Eliquis, BPH, Dementia [AAOx1 at baseline] who came in 10/18 for R elbow cellulitis, afebrile, non toxic, he was sent home on Clindamycin, as per records pt is returning due to persistent pain and swelling however the teams are having difficulty examining him since he becomes aggressive, I did with the help of a nursing Aid and it turns that the pt is very strong with no ROM limitation there is a small erythematous area with some scant yellowish secretion, there is no fluctuation, I think the pain reaction that the pt expresses is not proportionate to his current affected area.  WBC 14 K on 10/18 , no leukocytosis currently.   Blood cx 10/18 NGTD  No ESR and CRP available, pt refuses cultures.  CT RLE reviewed with no OM or bone erosions.   Surgery consulted, recommending conservative approach     -SSTI- R elbow cellulitis  -Advanced dementia  -CKD     PLAN:  Continue IV vancomycin inpatient 750 mg q24 hrs, check trough before 3rd dose, once pt is clinically stable for d/c can transition to doxycycline 100 mg q12 hrs PO with meals and seat up for 30 min post ingestion for total 14 days.   Consider more dedicated wound care/deep cleaning of the wound  ESR and CRP  Follow up cultures  Discussed with Dr. Dunn    Please reach ID with any questions or concerns  Dr. Eusebia Gutierrez  Available in Teams

## 2023-10-24 NOTE — PROGRESS NOTE ADULT - PROBLEM SELECTOR PLAN 4
hx of Dementia baseline AAOx1  uncooperative and combative   will give IM Zyprexa 2.5 mg once  c/w home med Paroxetine daily  ECG on 10/18  hx of Afib on Eliquis 2.5 mg BID and Metoprolol 25 mg   c/w Eliquis and Metoprolol tartrate 12.5 mg BID with holding parameters

## 2023-10-25 ENCOUNTER — TRANSCRIPTION ENCOUNTER (OUTPATIENT)
Age: 89
End: 2023-10-25

## 2023-10-25 VITALS
SYSTOLIC BLOOD PRESSURE: 129 MMHG | TEMPERATURE: 99 F | RESPIRATION RATE: 18 BRPM | HEART RATE: 98 BPM | OXYGEN SATURATION: 96 % | DIASTOLIC BLOOD PRESSURE: 72 MMHG

## 2023-10-25 LAB
-  AMPICILLIN/SULBACTAM: SIGNIFICANT CHANGE UP
-  AMPICILLIN/SULBACTAM: SIGNIFICANT CHANGE UP
-  CEFAZOLIN: SIGNIFICANT CHANGE UP
-  CEFAZOLIN: SIGNIFICANT CHANGE UP
-  CLINDAMYCIN: SIGNIFICANT CHANGE UP
-  CLINDAMYCIN: SIGNIFICANT CHANGE UP
-  ERYTHROMYCIN: SIGNIFICANT CHANGE UP
-  ERYTHROMYCIN: SIGNIFICANT CHANGE UP
-  GENTAMICIN: SIGNIFICANT CHANGE UP
-  GENTAMICIN: SIGNIFICANT CHANGE UP
-  OXACILLIN: SIGNIFICANT CHANGE UP
-  OXACILLIN: SIGNIFICANT CHANGE UP
-  PENICILLIN: SIGNIFICANT CHANGE UP
-  PENICILLIN: SIGNIFICANT CHANGE UP
-  RIFAMPIN: SIGNIFICANT CHANGE UP
-  RIFAMPIN: SIGNIFICANT CHANGE UP
-  TETRACYCLINE: SIGNIFICANT CHANGE UP
-  TETRACYCLINE: SIGNIFICANT CHANGE UP
-  TRIMETHOPRIM/SULFAMETHOXAZOLE: SIGNIFICANT CHANGE UP
-  TRIMETHOPRIM/SULFAMETHOXAZOLE: SIGNIFICANT CHANGE UP
-  VANCOMYCIN: SIGNIFICANT CHANGE UP
-  VANCOMYCIN: SIGNIFICANT CHANGE UP
ALBUMIN SERPL ELPH-MCNC: 3.5 G/DL — SIGNIFICANT CHANGE UP (ref 3.5–5)
ALBUMIN SERPL ELPH-MCNC: 3.5 G/DL — SIGNIFICANT CHANGE UP (ref 3.5–5)
ALP SERPL-CCNC: 82 U/L — SIGNIFICANT CHANGE UP (ref 40–120)
ALP SERPL-CCNC: 82 U/L — SIGNIFICANT CHANGE UP (ref 40–120)
ALT FLD-CCNC: 16 U/L DA — SIGNIFICANT CHANGE UP (ref 10–60)
ALT FLD-CCNC: 16 U/L DA — SIGNIFICANT CHANGE UP (ref 10–60)
ANION GAP SERPL CALC-SCNC: 10 MMOL/L — SIGNIFICANT CHANGE UP (ref 5–17)
ANION GAP SERPL CALC-SCNC: 10 MMOL/L — SIGNIFICANT CHANGE UP (ref 5–17)
AST SERPL-CCNC: 16 U/L — SIGNIFICANT CHANGE UP (ref 10–40)
AST SERPL-CCNC: 16 U/L — SIGNIFICANT CHANGE UP (ref 10–40)
BILIRUB SERPL-MCNC: 0.7 MG/DL — SIGNIFICANT CHANGE UP (ref 0.2–1.2)
BILIRUB SERPL-MCNC: 0.7 MG/DL — SIGNIFICANT CHANGE UP (ref 0.2–1.2)
BUN SERPL-MCNC: 21 MG/DL — HIGH (ref 7–18)
BUN SERPL-MCNC: 21 MG/DL — HIGH (ref 7–18)
CALCIUM SERPL-MCNC: 9.3 MG/DL — SIGNIFICANT CHANGE UP (ref 8.4–10.5)
CALCIUM SERPL-MCNC: 9.3 MG/DL — SIGNIFICANT CHANGE UP (ref 8.4–10.5)
CHLORIDE SERPL-SCNC: 111 MMOL/L — HIGH (ref 96–108)
CHLORIDE SERPL-SCNC: 111 MMOL/L — HIGH (ref 96–108)
CO2 SERPL-SCNC: 21 MMOL/L — LOW (ref 22–31)
CO2 SERPL-SCNC: 21 MMOL/L — LOW (ref 22–31)
CREAT SERPL-MCNC: 1.38 MG/DL — HIGH (ref 0.5–1.3)
CREAT SERPL-MCNC: 1.38 MG/DL — HIGH (ref 0.5–1.3)
CRP SERPL-MCNC: 21 MG/L — HIGH
CRP SERPL-MCNC: 21 MG/L — HIGH
EGFR: 45 ML/MIN/1.73M2 — LOW
EGFR: 45 ML/MIN/1.73M2 — LOW
ERYTHROCYTE [SEDIMENTATION RATE] IN BLOOD: 43 MM/HR — HIGH (ref 0–20)
ERYTHROCYTE [SEDIMENTATION RATE] IN BLOOD: 43 MM/HR — HIGH (ref 0–20)
GLUCOSE SERPL-MCNC: 103 MG/DL — HIGH (ref 70–99)
GLUCOSE SERPL-MCNC: 103 MG/DL — HIGH (ref 70–99)
HCT VFR BLD CALC: 32.9 % — LOW (ref 39–50)
HCT VFR BLD CALC: 32.9 % — LOW (ref 39–50)
HGB BLD-MCNC: 11 G/DL — LOW (ref 13–17)
HGB BLD-MCNC: 11 G/DL — LOW (ref 13–17)
MCHC RBC-ENTMCNC: 33.4 GM/DL — SIGNIFICANT CHANGE UP (ref 32–36)
MCHC RBC-ENTMCNC: 33.4 GM/DL — SIGNIFICANT CHANGE UP (ref 32–36)
MCHC RBC-ENTMCNC: 34.1 PG — HIGH (ref 27–34)
MCHC RBC-ENTMCNC: 34.1 PG — HIGH (ref 27–34)
MCV RBC AUTO: 101.9 FL — HIGH (ref 80–100)
MCV RBC AUTO: 101.9 FL — HIGH (ref 80–100)
METHOD TYPE: SIGNIFICANT CHANGE UP
METHOD TYPE: SIGNIFICANT CHANGE UP
NRBC # BLD: 0 /100 WBCS — SIGNIFICANT CHANGE UP (ref 0–0)
NRBC # BLD: 0 /100 WBCS — SIGNIFICANT CHANGE UP (ref 0–0)
PLATELET # BLD AUTO: 272 K/UL — SIGNIFICANT CHANGE UP (ref 150–400)
PLATELET # BLD AUTO: 272 K/UL — SIGNIFICANT CHANGE UP (ref 150–400)
POTASSIUM SERPL-MCNC: 4.7 MMOL/L — SIGNIFICANT CHANGE UP (ref 3.5–5.3)
POTASSIUM SERPL-MCNC: 4.7 MMOL/L — SIGNIFICANT CHANGE UP (ref 3.5–5.3)
POTASSIUM SERPL-SCNC: 4.7 MMOL/L — SIGNIFICANT CHANGE UP (ref 3.5–5.3)
POTASSIUM SERPL-SCNC: 4.7 MMOL/L — SIGNIFICANT CHANGE UP (ref 3.5–5.3)
PROT SERPL-MCNC: 7.4 G/DL — SIGNIFICANT CHANGE UP (ref 6–8.3)
PROT SERPL-MCNC: 7.4 G/DL — SIGNIFICANT CHANGE UP (ref 6–8.3)
RBC # BLD: 3.23 M/UL — LOW (ref 4.2–5.8)
RBC # BLD: 3.23 M/UL — LOW (ref 4.2–5.8)
RBC # FLD: 12.9 % — SIGNIFICANT CHANGE UP (ref 10.3–14.5)
RBC # FLD: 12.9 % — SIGNIFICANT CHANGE UP (ref 10.3–14.5)
SODIUM SERPL-SCNC: 142 MMOL/L — SIGNIFICANT CHANGE UP (ref 135–145)
SODIUM SERPL-SCNC: 142 MMOL/L — SIGNIFICANT CHANGE UP (ref 135–145)
VANCOMYCIN TROUGH SERPL-MCNC: 18.9 UG/ML — SIGNIFICANT CHANGE UP (ref 10–20)
VANCOMYCIN TROUGH SERPL-MCNC: 18.9 UG/ML — SIGNIFICANT CHANGE UP (ref 10–20)
WBC # BLD: 11.34 K/UL — HIGH (ref 3.8–10.5)
WBC # BLD: 11.34 K/UL — HIGH (ref 3.8–10.5)
WBC # FLD AUTO: 11.34 K/UL — HIGH (ref 3.8–10.5)
WBC # FLD AUTO: 11.34 K/UL — HIGH (ref 3.8–10.5)

## 2023-10-25 PROCEDURE — 99239 HOSP IP/OBS DSCHRG MGMT >30: CPT

## 2023-10-25 RX ORDER — OLANZAPINE 15 MG/1
2.5 TABLET, FILM COATED ORAL ONCE
Refills: 0 | Status: COMPLETED | OUTPATIENT
Start: 2023-10-25 | End: 2023-10-25

## 2023-10-25 RX ADMIN — OLANZAPINE 2.5 MILLIGRAM(S): 15 TABLET, FILM COATED ORAL at 00:56

## 2023-10-25 RX ADMIN — Medication 250 MILLIGRAM(S): at 12:51

## 2023-10-25 NOTE — DISCHARGE NOTE NURSING/CASE MANAGEMENT/SOCIAL WORK - PATIENT PORTAL LINK FT
You can access the FollowMyHealth Patient Portal offered by [facility] by registering at the following website: [email or web address]. By joining [employer]’s FollowMyHealth portal, you will also be able to view your health information using other applications (apps) compatible with our system.

## 2023-10-25 NOTE — DISCHARGE NOTE NURSING/CASE MANAGEMENT/SOCIAL WORK - NSDCPEFALRISK_GEN_ALL_CORE
For information on Fall & Injury Prevention, visit: [email or web address] OR  [email or web address] OR  https://www.cdc.gov/steadi/patient.html

## 2023-10-25 NOTE — PROGRESS NOTE ADULT - SUBJECTIVE AND OBJECTIVE BOX
NP Note discussed with  primary attending    Patient is a 89y old  Male who presents with a chief complaint of Cellulitis (24 Oct 2023 11:56)      INTERVAL HPI/OVERNIGHT EVENTS: no new complaints    MEDICATIONS  (STANDING):  amLODIPine   Tablet 2.5 milliGRAM(s) Oral daily  apixaban 2.5 milliGRAM(s) Oral every 12 hours  atorvastatin 40 milliGRAM(s) Oral at bedtime  cyanocobalamin 1000 MICROGram(s) Oral daily  haloperidol    Injectable 1 milliGRAM(s) IntraMuscular once  isosorbide   dinitrate Tablet (ISORDIL) 10 milliGRAM(s) Oral two times a day  metoprolol tartrate 12.5 milliGRAM(s) Oral every 12 hours  PARoxetine 10 milliGRAM(s) Oral daily  tamsulosin 0.4 milliGRAM(s) Oral at bedtime  vancomycin  IVPB 750 milliGRAM(s) IV Intermittent every 24 hours    MEDICATIONS  (PRN):  acetaminophen     Tablet .. 650 milliGRAM(s) Oral every 6 hours PRN Temp greater or equal to 38C (100.4F), Mild Pain (1 - 3)  collagenase Ointment 1 Application(s) Topical daily PRN Ulcer      __________________________________________________  REVIEW OF SYSTEMS:    CONSTITUTIONAL: No fever,   EYES: no acute visual disturbances  NECK: No pain or stiffness  RESPIRATORY: No cough; No shortness of breath  CARDIOVASCULAR: No chest pain, no palpitations  GASTROINTESTINAL: No pain. No nausea or vomiting; No diarrhea   NEUROLOGICAL: No headache or numbness, no tremors  MUSCULOSKELETAL: No joint pain, no muscle pain  GENITOURINARY: no dysuria, no frequency, no hesitancy  PSYCHIATRY: no depression , no anxiety  ALL OTHER  ROS negative        Vital Signs Last 24 Hrs  T(C): 36.6 (24 Oct 2023 05:26), Max: 36.6 (24 Oct 2023 05:26)  T(F): 97.8 (24 Oct 2023 05:26), Max: 97.8 (24 Oct 2023 05:26)  HR: 84 (24 Oct 2023 05:26) (71 - 92)  BP: 117/60 (24 Oct 2023 05:26) (101/43 - 141/81)  BP(mean): 61 (24 Oct 2023 01:10) (61 - 61)  RR: 18 (24 Oct 2023 05:26) (18 - 18)  SpO2: 97% (24 Oct 2023 05:26) (96% - 97%)    Parameters below as of 24 Oct 2023 05:26  Patient On (Oxygen Delivery Method): room air        ________________________________________________  PHYSICAL EXAM:  GENERAL: NAD  HEENT: Normocephalic;  conjunctivae and sclerae clear; moist mucous membranes;   NECK : supple  CHEST/LUNG: Clear to auscultation bilaterally with good air entry   HEART: S1 S2  regular; no murmurs, gallops or rubs  ABDOMEN: Soft, Nontender, Nondistended; Bowel sounds present  EXTREMITIES: + Left elbow swelling/rednes  SKIN: warm and dry; no rash  NERVOUS SYSTEM:  A&Ox1    _________________________________________________  LABS:                        8.9    7.40  )-----------( 241      ( 23 Oct 2023 14:21 )             27.1     10-23    140  |  110<H>  |  34<H>  ----------------------------<  108<H>  4.7   |  21<L>  |  1.60<H>    Ca    8.8      23 Oct 2023 14:21    TPro  6.9  /  Alb  3.3<L>  /  TBili  0.3  /  DBili  x   /  AST  6<L>  /  ALT  17  /  AlkPhos  82  10-23    PT/INR - ( 23 Oct 2023 14:21 )   PT: 17.4 sec;   INR: 1.55 ratio         PTT - ( 23 Oct 2023 14:21 )  PTT:41.9 sec  Urinalysis Basic - ( 23 Oct 2023 14:21 )    Color: x / Appearance: x / SG: x / pH: x  Gluc: 108 mg/dL / Ketone: x  / Bili: x / Urobili: x   Blood: x / Protein: x / Nitrite: x   Leuk Esterase: x / RBC: x / WBC x   Sq Epi: x / Non Sq Epi: x / Bacteria: x      CAPILLARY BLOOD GLUCOSE            RADIOLOGY & ADDITIONAL TESTS:  < from: CT Upper Extremity No Cont, Right (10.23.23 @ 16:07) >    Evaluation for fluid collection is limited by lack of intravenous   contrast.    Prominent confluent edema along the olecranon bursa which extends   distally along the dorsal forearm to the level of the hand. No   subcutaneous air.    No CT evidence of osteomyelitis. There is persistent concern for   osteomyelitis correlation with MRI could be performed.    < end of copied text >    Imaging Personally Reviewed:  YES    Consultant(s) Notes Reviewed:   YES    Care Discussed with Consultants :     Plan of care was discussed with patient and /or primary care giver; all questions and concerns were addressed and care was aligned with patient's wishes.    
INTERVAL HPI/OVERNIGHT EVENTS:  Pt resting comfortably in bed. Unable to obtain complete ROS due to patient not having hearing aids in / confused. Per chart review, no fevers or acute overnight events.       MEDICATIONS  (STANDING):  amLODIPine   Tablet 2.5 milliGRAM(s) Oral daily  apixaban 2.5 milliGRAM(s) Oral every 12 hours  atorvastatin 40 milliGRAM(s) Oral at bedtime  cyanocobalamin 1000 MICROGram(s) Oral daily  isosorbide   dinitrate Tablet (ISORDIL) 10 milliGRAM(s) Oral two times a day  metoprolol tartrate 12.5 milliGRAM(s) Oral every 12 hours  PARoxetine 10 milliGRAM(s) Oral daily  tamsulosin 0.4 milliGRAM(s) Oral at bedtime  vancomycin  IVPB 750 milliGRAM(s) IV Intermittent every 24 hours    MEDICATIONS  (PRN):  acetaminophen     Tablet .. 650 milliGRAM(s) Oral every 6 hours PRN Temp greater or equal to 38C (100.4F), Mild Pain (1 - 3)  collagenase Ointment 1 Application(s) Topical daily PRN Ulcer      Vital Signs Last 24 Hrs  T(C): 36.3 (25 Oct 2023 05:24), Max: 36.6 (24 Oct 2023 21:23)  T(F): 97.3 (25 Oct 2023 05:24), Max: 97.9 (24 Oct 2023 21:23)  HR: 79 (25 Oct 2023 05:24) (76 - 79)  BP: 161/67 (25 Oct 2023 05:24) (142/64 - 161/67)  BP(mean): --  RR: 18 (25 Oct 2023 05:24) (18 - 18)  SpO2: 96% (25 Oct 2023 05:24) (96% - 98%)    Parameters below as of 25 Oct 2023 05:24  Patient On (Oxygen Delivery Method): room air        Physical Exam:  General: NAD  Respiratory: Nonlabored breathing, equal chest rise b/l   Abdomen: soft,  nondistended, nontender  Extremities: RUE mild edema, erythema extending from elbow distally, minimal extension proximally. Warm to touch. Nontender to palpation. Punctate noted on posterior elbow joint, no drainage from area. No crepitus or fluctuance palpated. No obvious drainage. Radial pulse palpable. ROM intact.      I&O's Detail    24 Oct 2023 07:01  -  25 Oct 2023 07:00  --------------------------------------------------------  IN:  Total IN: 0 mL    OUT:    Voided (mL): 200 mL  Total OUT: 200 mL    Total NET: -200 mL          LABS:                        11.0   11.34 )-----------( 272      ( 25 Oct 2023 06:30 )             32.9             10-25    142  |  111<H>  |  21<H>  ----------------------------<  103<H>  4.7   |  21<L>  |  1.38<H>    Ca    9.3      25 Oct 2023 06:30    TPro  7.4  /  Alb  3.5  /  TBili  0.7  /  DBili  x   /  AST  16  /  ALT  16  /  AlkPhos  82  10-25      
INTERVAL HPI/OVERNIGHT EVENTS: NAEO. AVSS. Patient seen and examined at bedside. Unable to obtain complete ROS due to patient not having hearing aid. Attempted to communicate via writing. Per chart review, no fevers, WBC downtrending. Remains on vancomycin.     Vital Signs Last 24 Hrs:  T(C): 36.6 (24 Oct 2023 05:26), Max: 36.7 (23 Oct 2023 12:18)  T(F): 97.8 (24 Oct 2023 05:26), Max: 98 (23 Oct 2023 12:18)  HR: 84 (24 Oct 2023 05:26) (71 - 92)  BP: 117/60 (24 Oct 2023 05:26) (101/43 - 141/81)  BP(mean): 61 (24 Oct 2023 01:10) (61 - 61)  RR: 18 (24 Oct 2023 05:26) (18 - 18)  SpO2: 97% (24 Oct 2023 05:26) (95% - 97%)    Parameters below as of 24 Oct 2023 05:26  Patient On (Oxygen Delivery Method): room air      I&O's Detail    tamsulosin 0.4 milliGRAM(s) Oral at bedtime  vancomycin  IVPB 1000 milliGRAM(s) IV Intermittent every 12 hours      Physical Exam:  General: No acute distress  HEENT: NC/AT, trachea midline  Respiratory: Nonlabored breathing, equal chest rise b/l   Skin: No jaundice, no icterus  Abdomen: soft,  nondistended, nontender  Extremities: RUE edematous, erythema extending from elbow distally, warm to touch. Minimal tenderness. No crepitus or fluctuance. No obvious drainage.  Radial pulse palpable    Lines/Drains/Tubes:     Drains/Tubes:       Labs:                        8.9    7.40  )-----------( 241      ( 23 Oct 2023 14:21 )             27.1     10-23    140  |  110<H>  |  34<H>  ----------------------------<  108<H>  4.7   |  21<L>  |  1.60<H>    Ca    8.8      23 Oct 2023 14:21    TPro  6.9  /  Alb  3.3<L>  /  TBili  0.3  /  DBili  x   /  AST  6<L>  /  ALT  17  /  AlkPhos  82  10-23    PT/INR - ( 23 Oct 2023 14:21 )   PT: 17.4 sec;   INR: 1.55 ratio         PTT - ( 23 Oct 2023 14:21 )  PTT:41.9 sec    RADIOLOGY & ADDITIONAL STUDIES:

## 2023-10-25 NOTE — CHART NOTE - NSCHARTNOTEFT_GEN_A_CORE
Left my cell phone number at customer service for [facility] call PATRICIA Salmon at [phone].

## 2023-10-25 NOTE — PROGRESS NOTE ADULT - ASSESSMENT
89 male with RUE cellulitis   no evidence of drainable collection clinically or per CT  Afebrile, Leukocytosis improved, blood cx NGTD    PLAN  - no acute surgical intervention   - Continue medical management   - IV abx/ID consult   - trend WBC   - warm compresses   - Elevate RUE, RUE arm precautions   - Pain control PRN  
89 male with RUE cellulitis   no evidence of drainable collection clinically or per CT  Afebrile, Leukocytosis improved    PLAN  - no acute surgical intervention at this time   - continue IV antibiotics, follow ID recommendations   - trend WBC   - warm compresses   - Elevate RUE, RUE arm precautions   - Pain control PRN  - re consult prn     
89 yrs old M, from home, HHA 24/7, pmhx of HTN, HLD, CAD, Afib on Eliquis, BPH, Dementia [AAOx1 at baseline] presented with Rt UE swelling. Pt is admitted for RUE cellulitis. CT LUE shows no evidence of osteomyelitis. Started on Vanco. ID consulted.

## 2023-10-26 NOTE — CHART NOTE - NSCHARTNOTEFT_GEN_A_CORE
Abscess culture result noted to grow Staph aureus, not sensitive to Tetracycline. Patient was discharged on Doxycycline. Sent new prescription for Bactrim after verifying with Dr. Couch. Called patient's daughter and updated about the change of plan.

## 2023-10-28 LAB
CULTURE RESULTS: ABNORMAL
CULTURE RESULTS: ABNORMAL
ORGANISM # SPEC MICROSCOPIC CNT: ABNORMAL
SPECIMEN SOURCE: SIGNIFICANT CHANGE UP
SPECIMEN SOURCE: SIGNIFICANT CHANGE UP

## 2023-11-13 PROCEDURE — 85025 COMPLETE CBC W/AUTO DIFF WBC: CPT

## 2023-11-13 PROCEDURE — 85610 PROTHROMBIN TIME: CPT

## 2023-11-13 PROCEDURE — 36415 COLL VENOUS BLD VENIPUNCTURE: CPT

## 2023-11-13 PROCEDURE — 99285 EMERGENCY DEPT VISIT HI MDM: CPT | Mod: 25

## 2023-11-13 PROCEDURE — 73200 CT UPPER EXTREMITY W/O DYE: CPT | Mod: MA

## 2023-11-13 PROCEDURE — 87205 SMEAR GRAM STAIN: CPT

## 2023-11-13 PROCEDURE — 87070 CULTURE OTHR SPECIMN AEROBIC: CPT

## 2023-11-13 PROCEDURE — 86140 C-REACTIVE PROTEIN: CPT

## 2023-11-13 PROCEDURE — 80053 COMPREHEN METABOLIC PANEL: CPT

## 2023-11-13 PROCEDURE — 85652 RBC SED RATE AUTOMATED: CPT

## 2023-11-13 PROCEDURE — 85730 THROMBOPLASTIN TIME PARTIAL: CPT

## 2023-11-13 PROCEDURE — 80202 ASSAY OF VANCOMYCIN: CPT

## 2023-11-13 PROCEDURE — 96374 THER/PROPH/DIAG INJ IV PUSH: CPT

## 2023-11-13 PROCEDURE — 85027 COMPLETE CBC AUTOMATED: CPT

## 2023-11-13 PROCEDURE — 87186 SC STD MICRODIL/AGAR DIL: CPT

## 2024-06-20 ENCOUNTER — EMERGENCY (EMERGENCY)
Facility: HOSPITAL | Age: 89
LOS: 1 days | Discharge: HOME | End: 2024-06-20
Attending: STUDENT IN AN ORGANIZED HEALTH CARE EDUCATION/TRAINING PROGRAM
Payer: MEDICARE

## 2024-06-20 VITALS
HEIGHT: 66 IN | TEMPERATURE: 98 F | OXYGEN SATURATION: 100 % | WEIGHT: 120.37 LBS | HEART RATE: 78 BPM | RESPIRATION RATE: 20 BRPM | DIASTOLIC BLOOD PRESSURE: 65 MMHG | SYSTOLIC BLOOD PRESSURE: 162 MMHG

## 2024-06-20 DIAGNOSIS — Z95.1 PRESENCE OF AORTOCORONARY BYPASS GRAFT: Chronic | ICD-10-CM

## 2024-06-20 LAB
ALBUMIN SERPL ELPH-MCNC: 3.5 G/DL — SIGNIFICANT CHANGE UP (ref 3.5–5)
ALP SERPL-CCNC: 83 U/L — SIGNIFICANT CHANGE UP (ref 40–120)
ALT FLD-CCNC: 17 U/L DA — SIGNIFICANT CHANGE UP (ref 10–60)
ANION GAP SERPL CALC-SCNC: 7 MMOL/L — SIGNIFICANT CHANGE UP (ref 5–17)
AST SERPL-CCNC: 19 U/L — SIGNIFICANT CHANGE UP (ref 10–40)
BASOPHILS # BLD AUTO: 0.06 K/UL — SIGNIFICANT CHANGE UP (ref 0–0.2)
BASOPHILS NFR BLD AUTO: 0.9 % — SIGNIFICANT CHANGE UP (ref 0–2)
BILIRUB SERPL-MCNC: 0.6 MG/DL — SIGNIFICANT CHANGE UP (ref 0.2–1.2)
BUN SERPL-MCNC: 34 MG/DL — HIGH (ref 7–18)
CALCIUM SERPL-MCNC: 8.3 MG/DL — LOW (ref 8.4–10.5)
CHLORIDE SERPL-SCNC: 108 MMOL/L — SIGNIFICANT CHANGE UP (ref 96–108)
CO2 SERPL-SCNC: 21 MMOL/L — LOW (ref 22–31)
CREAT SERPL-MCNC: 1.5 MG/DL — HIGH (ref 0.5–1.3)
EGFR: 40 ML/MIN/1.73M2 — LOW
EOSINOPHIL # BLD AUTO: 0.13 K/UL — SIGNIFICANT CHANGE UP (ref 0–0.5)
EOSINOPHIL NFR BLD AUTO: 1.9 % — SIGNIFICANT CHANGE UP (ref 0–6)
GLUCOSE SERPL-MCNC: 93 MG/DL — SIGNIFICANT CHANGE UP (ref 70–99)
HCT VFR BLD CALC: 25 % — LOW (ref 39–50)
HGB BLD-MCNC: 8.5 G/DL — LOW (ref 13–17)
IMM GRANULOCYTES NFR BLD AUTO: 0.4 % — SIGNIFICANT CHANGE UP (ref 0–0.9)
LIDOCAIN IGE QN: 36 U/L — SIGNIFICANT CHANGE UP (ref 13–75)
LYMPHOCYTES # BLD AUTO: 1.79 K/UL — SIGNIFICANT CHANGE UP (ref 1–3.3)
LYMPHOCYTES # BLD AUTO: 26.8 % — SIGNIFICANT CHANGE UP (ref 13–44)
MAGNESIUM SERPL-MCNC: 1.4 MG/DL — LOW (ref 1.6–2.6)
MCHC RBC-ENTMCNC: 34 GM/DL — SIGNIFICANT CHANGE UP (ref 32–36)
MCHC RBC-ENTMCNC: 34.8 PG — HIGH (ref 27–34)
MCV RBC AUTO: 102.5 FL — HIGH (ref 80–100)
MONOCYTES # BLD AUTO: 0.87 K/UL — SIGNIFICANT CHANGE UP (ref 0–0.9)
MONOCYTES NFR BLD AUTO: 13 % — SIGNIFICANT CHANGE UP (ref 2–14)
NEUTROPHILS # BLD AUTO: 3.79 K/UL — SIGNIFICANT CHANGE UP (ref 1.8–7.4)
NEUTROPHILS NFR BLD AUTO: 57 % — SIGNIFICANT CHANGE UP (ref 43–77)
NRBC # BLD: 0 /100 WBCS — SIGNIFICANT CHANGE UP (ref 0–0)
NT-PROBNP SERPL-SCNC: 3006 PG/ML — HIGH (ref 0–450)
PLATELET # BLD AUTO: 163 K/UL — SIGNIFICANT CHANGE UP (ref 150–400)
POTASSIUM SERPL-MCNC: 5.1 MMOL/L — SIGNIFICANT CHANGE UP (ref 3.5–5.3)
POTASSIUM SERPL-SCNC: 5.1 MMOL/L — SIGNIFICANT CHANGE UP (ref 3.5–5.3)
PROT SERPL-MCNC: 6.5 G/DL — SIGNIFICANT CHANGE UP (ref 6–8.3)
RBC # BLD: 2.44 M/UL — LOW (ref 4.2–5.8)
RBC # FLD: 12.9 % — SIGNIFICANT CHANGE UP (ref 10.3–14.5)
SODIUM SERPL-SCNC: 136 MMOL/L — SIGNIFICANT CHANGE UP (ref 135–145)
TROPONIN I, HIGH SENSITIVITY RESULT: 9.9 NG/L — SIGNIFICANT CHANGE UP
WBC # BLD: 6.67 K/UL — SIGNIFICANT CHANGE UP (ref 3.8–10.5)
WBC # FLD AUTO: 6.67 K/UL — SIGNIFICANT CHANGE UP (ref 3.8–10.5)

## 2024-06-20 PROCEDURE — 36415 COLL VENOUS BLD VENIPUNCTURE: CPT

## 2024-06-20 PROCEDURE — 93005 ELECTROCARDIOGRAM TRACING: CPT

## 2024-06-20 PROCEDURE — 99285 EMERGENCY DEPT VISIT HI MDM: CPT | Mod: 25

## 2024-06-20 PROCEDURE — 93010 ELECTROCARDIOGRAM REPORT: CPT

## 2024-06-20 PROCEDURE — 71045 X-RAY EXAM CHEST 1 VIEW: CPT

## 2024-06-20 PROCEDURE — 85025 COMPLETE CBC W/AUTO DIFF WBC: CPT

## 2024-06-20 PROCEDURE — 83690 ASSAY OF LIPASE: CPT

## 2024-06-20 PROCEDURE — 71045 X-RAY EXAM CHEST 1 VIEW: CPT | Mod: 26

## 2024-06-20 PROCEDURE — 83735 ASSAY OF MAGNESIUM: CPT

## 2024-06-20 PROCEDURE — 83880 ASSAY OF NATRIURETIC PEPTIDE: CPT

## 2024-06-20 PROCEDURE — 80053 COMPREHEN METABOLIC PANEL: CPT

## 2024-06-20 PROCEDURE — 84484 ASSAY OF TROPONIN QUANT: CPT

## 2024-06-20 PROCEDURE — 99285 EMERGENCY DEPT VISIT HI MDM: CPT

## 2024-06-20 NOTE — ED PROVIDER NOTE - OBJECTIVE STATEMENT
89M pmh of HTN, HLD, CAD, Afib on Eliquis, BPH, Dementia [AAOx1 at baseline] presenting with difficulty breathing. Home health aide reports the patient was in his normal state of health this morning but after walking to the bathroom and sitting down he began rubbing his leg and then developed shortness of breath for approximately 20 minutes.  States patient's breathing is back to normal.  No reported fever, chest pain, nausea, vomiting or coughing.

## 2024-06-20 NOTE — ED PROVIDER NOTE - CLINICAL SUMMARY MEDICAL DECISION MAKING FREE TEXT BOX
89-year-old male presenting with shortness of breath.  Patient breathing comfortably on examination.  Not on oxygen at baseline.  Concern for ACS versus aspiration pneumonia versus viral illness.  Will obtain labs, chest x-ray to assess.  Patient is DNR/DNI per aide at bedside.    Next of kin: Alaina Eve [phone] 89-year-old male presenting with shortness of breath.  Patient breathing comfortably on examination.  Not on oxygen at baseline.  Concern for ACS versus aspiration pneumonia versus viral illness.  Will obtain labs, chest x-ray to assess.  Patient is DNR/DNI per aide at bedside.    Next of kin: Alaina [place] [phone]    spoke with the patients next of kin. agree with plan for discharge. no focal consolidations on my review of xray images. will call house calls program to have followup.

## 2024-06-20 NOTE — ED PROVIDER NOTE - NSFOLLOWUPINSTRUCTIONS_ED_ALL_ED_FT
You were seen in the emergency department for difficulty breathing    Please follow-up with your primary care doctor within the next 24-48 hours.    If you have any worsening symptoms, severe headache, chest pain, trouble breathing, please return to the emergency department.

## 2024-06-20 NOTE — ED ADULT NURSE NOTE - OBJECTIVE STATEMENT
Covering TORITO Dong-- Patient BIB EMS c/o shortness of breath x 2 hours ago. Patient hard of hearing. Patient at this time denies any complaints. EKG completed, patient placed on cardiac monitor.

## 2024-06-20 NOTE — ED PROVIDER NOTE - PATIENT PORTAL LINK FT
You can access the FollowMyHealth Patient Portal offered by [facility] by registering at the following website: [email or web address]. By joining [employer]’s FollowMyHealth portal, you will also be able to view your health information using other applications (apps) compatible with our system.

## 2024-12-02 ENCOUNTER — EMERGENCY (EMERGENCY)
Facility: HOSPITAL | Age: 89
LOS: 1 days | Discharge: HOME | End: 2024-12-02
Attending: STUDENT IN AN ORGANIZED HEALTH CARE EDUCATION/TRAINING PROGRAM
Payer: MEDICARE

## 2024-12-02 VITALS
SYSTOLIC BLOOD PRESSURE: 138 MMHG | RESPIRATION RATE: 17 BRPM | TEMPERATURE: 98 F | WEIGHT: 119.93 LBS | DIASTOLIC BLOOD PRESSURE: 57 MMHG | OXYGEN SATURATION: 96 % | HEART RATE: 74 BPM | HEIGHT: 66 IN

## 2024-12-02 VITALS
OXYGEN SATURATION: 94 % | HEART RATE: 67 BPM | RESPIRATION RATE: 18 BRPM | DIASTOLIC BLOOD PRESSURE: 66 MMHG | SYSTOLIC BLOOD PRESSURE: 145 MMHG | TEMPERATURE: 98 F

## 2024-12-02 DIAGNOSIS — Z95.1 PRESENCE OF AORTOCORONARY BYPASS GRAFT: Chronic | ICD-10-CM

## 2024-12-02 LAB
ALBUMIN SERPL ELPH-MCNC: 3.7 G/DL — SIGNIFICANT CHANGE UP (ref 3.5–5)
ALP SERPL-CCNC: 98 U/L — SIGNIFICANT CHANGE UP (ref 40–120)
ALT FLD-CCNC: 13 U/L DA — SIGNIFICANT CHANGE UP (ref 10–60)
ANION GAP SERPL CALC-SCNC: 7 MMOL/L — SIGNIFICANT CHANGE UP (ref 5–17)
APTT BLD: 40.7 SEC — HIGH (ref 24.5–35.6)
AST SERPL-CCNC: 14 U/L — SIGNIFICANT CHANGE UP (ref 10–40)
BASOPHILS # BLD AUTO: 0.07 K/UL — SIGNIFICANT CHANGE UP (ref 0–0.2)
BASOPHILS NFR BLD AUTO: 0.7 % — SIGNIFICANT CHANGE UP (ref 0–2)
BILIRUB SERPL-MCNC: 0.4 MG/DL — SIGNIFICANT CHANGE UP (ref 0.2–1.2)
BUN SERPL-MCNC: 21 MG/DL — HIGH (ref 7–18)
CALCIUM SERPL-MCNC: 9 MG/DL — SIGNIFICANT CHANGE UP (ref 8.4–10.5)
CHLORIDE SERPL-SCNC: 104 MMOL/L — SIGNIFICANT CHANGE UP (ref 96–108)
CO2 SERPL-SCNC: 25 MMOL/L — SIGNIFICANT CHANGE UP (ref 22–31)
CREAT SERPL-MCNC: 1.41 MG/DL — HIGH (ref 0.5–1.3)
EGFR: 43 ML/MIN/1.73M2 — LOW
EOSINOPHIL # BLD AUTO: 0.24 K/UL — SIGNIFICANT CHANGE UP (ref 0–0.5)
EOSINOPHIL NFR BLD AUTO: 2.5 % — SIGNIFICANT CHANGE UP (ref 0–6)
GLUCOSE SERPL-MCNC: 100 MG/DL — HIGH (ref 70–99)
HCT VFR BLD CALC: 26.5 % — LOW (ref 39–50)
HGB BLD-MCNC: 9 G/DL — LOW (ref 13–17)
IMM GRANULOCYTES NFR BLD AUTO: 0.7 % — SIGNIFICANT CHANGE UP (ref 0–0.9)
INR BLD: 1.39 RATIO — HIGH (ref 0.85–1.16)
LYMPHOCYTES # BLD AUTO: 1.28 K/UL — SIGNIFICANT CHANGE UP (ref 1–3.3)
LYMPHOCYTES # BLD AUTO: 13.5 % — SIGNIFICANT CHANGE UP (ref 13–44)
MCHC RBC-ENTMCNC: 34 G/DL — SIGNIFICANT CHANGE UP (ref 32–36)
MCHC RBC-ENTMCNC: 34.5 PG — HIGH (ref 27–34)
MCV RBC AUTO: 101.5 FL — HIGH (ref 80–100)
MONOCYTES # BLD AUTO: 1.23 K/UL — HIGH (ref 0–0.9)
MONOCYTES NFR BLD AUTO: 13 % — SIGNIFICANT CHANGE UP (ref 2–14)
NEUTROPHILS # BLD AUTO: 6.58 K/UL — SIGNIFICANT CHANGE UP (ref 1.8–7.4)
NEUTROPHILS NFR BLD AUTO: 69.6 % — SIGNIFICANT CHANGE UP (ref 43–77)
NRBC # BLD: 0 /100 WBCS — SIGNIFICANT CHANGE UP (ref 0–0)
PLATELET # BLD AUTO: 238 K/UL — SIGNIFICANT CHANGE UP (ref 150–400)
POTASSIUM SERPL-MCNC: 4.3 MMOL/L — SIGNIFICANT CHANGE UP (ref 3.5–5.3)
POTASSIUM SERPL-SCNC: 4.3 MMOL/L — SIGNIFICANT CHANGE UP (ref 3.5–5.3)
PROT SERPL-MCNC: 6.6 G/DL — SIGNIFICANT CHANGE UP (ref 6–8.3)
PROTHROM AB SERPL-ACNC: 16.2 SEC — HIGH (ref 9.9–13.4)
RBC # BLD: 2.61 M/UL — LOW (ref 4.2–5.8)
RBC # FLD: 12.5 % — SIGNIFICANT CHANGE UP (ref 10.3–14.5)
SODIUM SERPL-SCNC: 136 MMOL/L — SIGNIFICANT CHANGE UP (ref 135–145)
WBC # BLD: 9.47 K/UL — SIGNIFICANT CHANGE UP (ref 3.8–10.5)
WBC # FLD AUTO: 9.47 K/UL — SIGNIFICANT CHANGE UP (ref 3.8–10.5)

## 2024-12-02 PROCEDURE — 85730 THROMBOPLASTIN TIME PARTIAL: CPT

## 2024-12-02 PROCEDURE — 99284 EMERGENCY DEPT VISIT MOD MDM: CPT

## 2024-12-02 PROCEDURE — 99284 EMERGENCY DEPT VISIT MOD MDM: CPT | Mod: 25

## 2024-12-02 PROCEDURE — 73090 X-RAY EXAM OF FOREARM: CPT

## 2024-12-02 PROCEDURE — 36415 COLL VENOUS BLD VENIPUNCTURE: CPT

## 2024-12-02 PROCEDURE — 73090 X-RAY EXAM OF FOREARM: CPT | Mod: 26,LT

## 2024-12-02 PROCEDURE — 85610 PROTHROMBIN TIME: CPT

## 2024-12-02 PROCEDURE — 85025 COMPLETE CBC W/AUTO DIFF WBC: CPT

## 2024-12-02 PROCEDURE — 80053 COMPREHEN METABOLIC PANEL: CPT

## 2024-12-02 RX ORDER — ACETAMINOPHEN 500MG 500 MG/1
650 TABLET, COATED ORAL ONCE
Refills: 0 | Status: COMPLETED | OUTPATIENT
Start: 2024-12-02 | End: 2024-12-02

## 2024-12-02 RX ORDER — CYCLOBENZAPRINE HCL 10 MG
5 TABLET ORAL ONCE
Refills: 0 | Status: COMPLETED | OUTPATIENT
Start: 2024-12-02 | End: 2024-12-02

## 2024-12-02 RX ORDER — BACITRACIN ZINC AND POLYMYXIN B SULFATE 500; 10000 [USP'U]/G; [USP'U]/G
1 OINTMENT TOPICAL ONCE
Refills: 0 | Status: COMPLETED | OUTPATIENT
Start: 2024-12-02 | End: 2024-12-02

## 2024-12-02 RX ADMIN — Medication 5 MILLIGRAM(S): at 10:08

## 2024-12-02 RX ADMIN — ACETAMINOPHEN 500MG 650 MILLIGRAM(S): 500 TABLET, COATED ORAL at 10:08

## 2024-12-02 RX ADMIN — ACETAMINOPHEN 500MG 650 MILLIGRAM(S): 500 TABLET, COATED ORAL at 10:38

## 2024-12-02 RX ADMIN — BACITRACIN ZINC AND POLYMYXIN B SULFATE 1 APPLICATION(S): 500; 10000 OINTMENT TOPICAL at 10:55
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
## 2024-12-02 NOTE — ED PROVIDER NOTE - NSFOLLOWUPINSTRUCTIONS_ED_ALL_ED_FT
Skin Tear  A skin tear is a wound in which the top layers of skin have peeled off from the deeper skin or tissues under the skin. This is a common problem as people get older because the skin becomes thinner and easier to break. Also, some medicines, such as oral corticosteroids, can lead to thinning skin if they are taken for long.    Skin tears are often caused by:  Falls.  Removing tape strips from a wound.  Skin tears may be partial or full. The skin may be slit open (with the skin  from the wound but still attached) or the skin may be completely torn away. Putting the torn skin back to cover the wound helps the skin tear to heal.    A skin tear is often repaired with skin tape or tape strips. Depending on the location of the wound and the amount of drainage, a bandage may be put over a skin tape or tape strip.    Follow these instructions at home:  Wound care    Two wounds closed with skin glue. One is normal. The other is red with pus.  Clean the wound as told by your health care provider. You may be told to keep the wound dry for the first few days. If you are told to clean the wound:  Wash the wound as told. This may include using mild soap and water, a wound cleanser, or a saltwater or saline solution.  Do not use hydrogen peroxide or alcohol. These can slow healing.  If using soap, rinse the wound with water to remove all soap.  Do not rub the wound dry. Pat it gently with a clean towel or let it air-dry.  Change any bandages. This includes changing the bandage if it gets wet, gets dirty, or starts to smell bad. Use your antibiotic ointment if you are told.  To change your bandage:  Wash your hands with soap and water for at least 20 seconds before and after you change your bandage. If soap and water are not available, use hand .  Leave tape strips or bandages in place. These may need to stay in place for days or even weeks. If tape strip edges start to loosen and curl up, you may trim the loose edges. Do not remove tape strips or bandages unless your provider tells you to do that.  Check your wound every day for signs of infection. Check for:  Redness, swelling, or pain.  More fluid or blood.  Warmth.  Pus or a bad smell.  Do not scratch or pick at the wound.  Protect the injured area until it's healed.  Medicines    Take over-the-counter and prescription medicines only as told by your provider.  If you were prescribed antibiotics, take or apply them as told by your provider. Do not stop using the antibiotic even if you start to feel better.  General instructions    Keep the dressing dry as told by your provider.  Do not take baths, swim, use a hot tub, or do anything that puts your wound underwater until your provider approves. Ask your provider if you may take showers. You may only be allowed to take sponge baths.  Contact a health care provider if:  You have redness, swelling, or pain around your wound.  You have more fluid or blood coming from your wound.  Your wound, or the area around your wound, feels warm to the touch.  You have pus or a bad smell coming from your wound.  Get help right away if:  You have a red streak that goes away from your wound.  You have a fever and chills, and your symptoms suddenly get worse.  This information is not intended to replace advice given to you by your health care provider. Make sure you discuss any questions you have with your health care provider.    Document Revised: 02/28/2024 Document Reviewed: 02/28/2024
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
## 2024-12-02 NOTE — ED PROVIDER NOTE - PROGRESS NOTE DETAILS
JK - Labs imaging within normal limits.  Local wound care provided.  Ready for discharge with outpatient follow-up.  Vital signs stable rest comfortably, home health aide at bedside, understanding of plan.

## 2024-12-02 NOTE — ED ADULT NURSE NOTE - OBJECTIVE STATEMENT
patient BIBA accompanied by elliott Mora. as per aide Pt was noted to have skin tear to Left forearm this AM. patient presents awake and alert to person only. pt reports pain to Left arm,

## 2024-12-02 NOTE — ED PROVIDER NOTE - PATIENT PORTAL LINK FT
You can access the FollowMyHealth Patient Portal offered by [facility] by registering at the following website: [email or web address]. By joining [employer]’s FollowMyHealth portal, you will also be able to view your health information using other applications (apps) compatible with our system.

## 2024-12-02 NOTE — ED PROVIDER NOTE - PHYSICAL EXAMINATION
Gen: no acute distress  Head: normocephalic, atraumatic  Lung: CTAB, no respiratory distress, no wheezing, rales, rhonchi  Abd: soft, non-tender, non-distended  MSK: 3 x 3 skin tear to L forearm no active bleeding, full ROM, sensation intact, 2+ pulses capillary refill < 2 seconds  Neuro: aaox1. At baseline. Following commands, moving extremities equally

## 2025-01-24 NOTE — ED ADULT NURSE NOTE - OBJECTIVE STATEMENT
negative... No Patient presented to the ED with c/o swelling, pain and redness to right arm. Small redden blister/abscess little yellow drainage noted to right elbow. Pt able to lift his right arm, positive pulses and range of motion. Pt started on Clindamycin on Wednesday as per home attendant.

## 2025-02-04 ENCOUNTER — APPOINTMENT (OUTPATIENT)
Dept: GERIATRICS | Facility: CLINIC | Age: 89
End: 2025-02-04
Payer: MEDICARE

## 2025-02-04 VITALS
DIASTOLIC BLOOD PRESSURE: 69 MMHG | TEMPERATURE: 98.3 F | RESPIRATION RATE: 14 BRPM | SYSTOLIC BLOOD PRESSURE: 158 MMHG | WEIGHT: 134 LBS | OXYGEN SATURATION: 96 % | HEART RATE: 66 BPM | BODY MASS INDEX: 21.63 KG/M2

## 2025-02-04 DIAGNOSIS — E55.9 VITAMIN D DEFICIENCY, UNSPECIFIED: ICD-10-CM

## 2025-02-04 DIAGNOSIS — E78.5 HYPERLIPIDEMIA, UNSPECIFIED: ICD-10-CM

## 2025-02-04 DIAGNOSIS — Z79.01 LONG TERM (CURRENT) USE OF ANTICOAGULANTS: ICD-10-CM

## 2025-02-04 DIAGNOSIS — I48.91 UNSPECIFIED ATRIAL FIBRILLATION: ICD-10-CM

## 2025-02-04 DIAGNOSIS — N40.0 BENIGN PROSTATIC HYPERPLASIA WITHOUT LOWER URINARY TRACT SYMPMS: ICD-10-CM

## 2025-02-04 DIAGNOSIS — Z23 ENCOUNTER FOR IMMUNIZATION: ICD-10-CM

## 2025-02-04 DIAGNOSIS — I25.10 ATHEROSCLEROTIC HEART DISEASE OF NATIVE CORONARY ARTERY W/OUT ANGINA PECTORIS: ICD-10-CM

## 2025-02-04 DIAGNOSIS — F03.918 UNSPECIFIED DEMENTIA, UNSPECIFIED SEVERITY, WITH OTHER BEHAVIORAL DISTURBANCE: ICD-10-CM

## 2025-02-04 DIAGNOSIS — E53.8 DEFICIENCY OF OTHER SPECIFIED B GROUP VITAMINS: ICD-10-CM

## 2025-02-04 DIAGNOSIS — I10 ESSENTIAL (PRIMARY) HYPERTENSION: ICD-10-CM

## 2025-02-04 PROCEDURE — 99204 OFFICE O/P NEW MOD 45 MIN: CPT

## 2025-02-04 RX ORDER — RIBOFLAVIN (VITAMIN B2) 100 MG
100 TABLET ORAL DAILY
Qty: 14 | Refills: 0 | Status: ACTIVE | COMMUNITY
Start: 2025-02-04

## 2025-02-04 RX ORDER — FERROUS SULFATE 324(65)MG
324 (65 FE) TABLET, DELAYED RELEASE (ENTERIC COATED) ORAL
Refills: 0 | Status: ACTIVE | COMMUNITY
Start: 2025-02-04

## 2025-02-04 RX ORDER — QUETIAPINE 25 MG/1
25 TABLET, FILM COATED ORAL DAILY
Qty: 90 | Refills: 2 | Status: ACTIVE | COMMUNITY
Start: 2025-02-04

## 2025-02-04 RX ORDER — OMEPRAZOLE 40 MG/1
40 CAPSULE, DELAYED RELEASE ORAL
Qty: 30 | Refills: 3 | Status: ACTIVE | COMMUNITY
Start: 2025-02-04

## 2025-02-04 RX ORDER — CHOLECALCIFEROL (VITAMIN D3) 25 MCG
25 MCG TABLET ORAL
Refills: 0 | Status: ACTIVE | COMMUNITY
Start: 2025-02-04

## 2025-02-05 ENCOUNTER — NON-APPOINTMENT (OUTPATIENT)
Age: 89
End: 2025-02-05

## 2025-02-07 ENCOUNTER — NON-APPOINTMENT (OUTPATIENT)
Age: 89
End: 2025-02-07

## 2025-02-10 LAB
ALBUMIN SERPL ELPH-MCNC: 4.2 G/DL
ALP BLD-CCNC: 102 U/L
ALT SERPL-CCNC: 8 U/L
ANION GAP SERPL CALC-SCNC: 15 MMOL/L
AST SERPL-CCNC: 13 U/L
BILIRUB SERPL-MCNC: 0.3 MG/DL
BUN SERPL-MCNC: 27 MG/DL
CALCIUM SERPL-MCNC: 8.8 MG/DL
CHLORIDE SERPL-SCNC: 103 MMOL/L
CO2 SERPL-SCNC: 21 MMOL/L
CREAT SERPL-MCNC: 1.49 MG/DL
EGFR: 41 ML/MIN/1.73M2
FOLATE SERPL-MCNC: 17 NG/ML
GLUCOSE SERPL-MCNC: 101 MG/DL
HCT VFR BLD CALC: 27.6 %
HGB BLD-MCNC: 9.2 G/DL
MCHC RBC-ENTMCNC: 33.3 G/DL
MCHC RBC-ENTMCNC: 34.1 PG
MCV RBC AUTO: 102.2 FL
PLATELET # BLD AUTO: 193 K/UL
POTASSIUM SERPL-SCNC: 4.5 MMOL/L
PROT SERPL-MCNC: 6.2 G/DL
PSA FREE FLD-MCNC: 15 %
PSA FREE SERPL-MCNC: 0.24 NG/ML
PSA SERPL-MCNC: 1.64 NG/ML
RBC # BLD: 2.7 M/UL
RBC # FLD: 13 %
SODIUM SERPL-SCNC: 138 MMOL/L
TSH SERPL-ACNC: 1.31 UIU/ML
VIT B12 SERPL-MCNC: 380 PG/ML
WBC # FLD AUTO: 7.72 K/UL

## 2025-03-17 ENCOUNTER — NON-APPOINTMENT (OUTPATIENT)
Age: 89
End: 2025-03-17

## 2025-04-08 DIAGNOSIS — D50.8 OTHER IRON DEFICIENCY ANEMIAS: ICD-10-CM

## 2025-05-21 ENCOUNTER — NON-APPOINTMENT (OUTPATIENT)
Age: 89
End: 2025-05-21

## 2025-05-22 ENCOUNTER — LABORATORY RESULT (OUTPATIENT)
Age: 89
End: 2025-05-22

## 2025-05-22 ENCOUNTER — APPOINTMENT (OUTPATIENT)
Dept: GERIATRICS | Facility: CLINIC | Age: 89
End: 2025-05-22
Payer: MEDICARE

## 2025-05-22 VITALS
RESPIRATION RATE: 13 BRPM | SYSTOLIC BLOOD PRESSURE: 141 MMHG | HEART RATE: 44 BPM | OXYGEN SATURATION: 99 % | DIASTOLIC BLOOD PRESSURE: 49 MMHG | TEMPERATURE: 97.6 F

## 2025-05-22 VITALS — BODY MASS INDEX: 21.14 KG/M2 | WEIGHT: 131 LBS

## 2025-05-22 DIAGNOSIS — E78.5 HYPERLIPIDEMIA, UNSPECIFIED: ICD-10-CM

## 2025-05-22 DIAGNOSIS — E53.8 DEFICIENCY OF OTHER SPECIFIED B GROUP VITAMINS: ICD-10-CM

## 2025-05-22 DIAGNOSIS — L98.9 DISORDER OF THE SKIN AND SUBCUTANEOUS TISSUE, UNSPECIFIED: ICD-10-CM

## 2025-05-22 DIAGNOSIS — F03.918 UNSPECIFIED DEMENTIA, UNSPECIFIED SEVERITY, WITH OTHER BEHAVIORAL DISTURBANCE: ICD-10-CM

## 2025-05-22 DIAGNOSIS — D64.9 ANEMIA, UNSPECIFIED: ICD-10-CM

## 2025-05-22 DIAGNOSIS — I48.91 UNSPECIFIED ATRIAL FIBRILLATION: ICD-10-CM

## 2025-05-22 DIAGNOSIS — I25.10 ATHEROSCLEROTIC HEART DISEASE OF NATIVE CORONARY ARTERY W/OUT ANGINA PECTORIS: ICD-10-CM

## 2025-05-22 DIAGNOSIS — I10 ESSENTIAL (PRIMARY) HYPERTENSION: ICD-10-CM

## 2025-05-22 PROCEDURE — 99214 OFFICE O/P EST MOD 30 MIN: CPT

## 2025-05-27 ENCOUNTER — NON-APPOINTMENT (OUTPATIENT)
Age: 89
End: 2025-05-27

## 2025-05-28 ENCOUNTER — EMERGENCY (EMERGENCY)
Facility: HOSPITAL | Age: 89
LOS: 1 days | End: 2025-05-28
Attending: EMERGENCY MEDICINE | Admitting: EMERGENCY MEDICINE
Payer: MEDICARE

## 2025-05-28 ENCOUNTER — APPOINTMENT (OUTPATIENT)
Dept: GERIATRICS | Facility: CLINIC | Age: 89
End: 2025-05-28
Payer: MEDICARE

## 2025-05-28 VITALS — DIASTOLIC BLOOD PRESSURE: 54 MMHG | HEART RATE: 68 BPM | TEMPERATURE: 98 F | SYSTOLIC BLOOD PRESSURE: 153 MMHG

## 2025-05-28 VITALS
RESPIRATION RATE: 16 BRPM | SYSTOLIC BLOOD PRESSURE: 163 MMHG | WEIGHT: 125 LBS | HEART RATE: 61 BPM | DIASTOLIC BLOOD PRESSURE: 75 MMHG | TEMPERATURE: 98 F | OXYGEN SATURATION: 95 %

## 2025-05-28 VITALS
TEMPERATURE: 98 F | OXYGEN SATURATION: 96 % | BODY MASS INDEX: 20.98 KG/M2 | HEART RATE: 62 BPM | DIASTOLIC BLOOD PRESSURE: 63 MMHG | WEIGHT: 130 LBS | SYSTOLIC BLOOD PRESSURE: 138 MMHG | RESPIRATION RATE: 14 BRPM

## 2025-05-28 DIAGNOSIS — T14.8XXA OTHER INJURY OF UNSPECIFIED BODY REGION, INITIAL ENCOUNTER: ICD-10-CM

## 2025-05-28 DIAGNOSIS — Z95.1 PRESENCE OF AORTOCORONARY BYPASS GRAFT: Chronic | ICD-10-CM

## 2025-05-28 LAB
BLD GP AB SCN SERPL QL: NEGATIVE — SIGNIFICANT CHANGE UP
RH IG SCN BLD-IMP: POSITIVE — SIGNIFICANT CHANGE UP

## 2025-05-28 PROCEDURE — 73110 X-RAY EXAM OF WRIST: CPT | Mod: 26,RT

## 2025-05-28 PROCEDURE — 73130 X-RAY EXAM OF HAND: CPT | Mod: 26,RT

## 2025-05-28 PROCEDURE — 99284 EMERGENCY DEPT VISIT MOD MDM: CPT | Mod: FS

## 2025-05-28 PROCEDURE — 73090 X-RAY EXAM OF FOREARM: CPT | Mod: 26,RT

## 2025-05-28 PROCEDURE — 99215 OFFICE O/P EST HI 40 MIN: CPT

## 2025-05-28 RX ORDER — ACETAMINOPHEN 500 MG/5ML
650 LIQUID (ML) ORAL ONCE
Refills: 0 | Status: COMPLETED | OUTPATIENT
Start: 2025-05-28 | End: 2025-05-28

## 2025-05-28 RX ADMIN — Medication 650 MILLIGRAM(S): at 14:34

## 2025-05-28 NOTE — ED PROVIDER NOTE - PROGRESS NOTE DETAILS
Thelma Bustillo PA-C    XR of R forearm/wrist/hand:   Curvilinear lucency within the volar base of the fourth middle phalanx,   which may be sequela of an old injury or be related to an acute minimally   displaced intra-articular fracture of the volar base of the fourth middle   phalanx. Correlation with point tenderness is recommended. Flexion at the   fourth and fifth PIP joints.    Pt reassessed, no specific TTP to 4-5th PIP. Shared decision making employed with pt elliott Vigil and daughter-in-law Siomara, discussed risk vs benefit of further imaging and splinting given pt unlikely to cooperate and they agreed to defer. Arm placed in ACE wrap and advised compression, ice, elevation and pain control. Follow up PCP. Pt aide verbalized understanding of plan and agrees

## 2025-05-28 NOTE — ED ADULT NURSE NOTE - NSFALLRISKINTERV_ED_ALL_ED
Assistance OOB with selected safe patient handling equipment if applicable/Communicate fall risk and risk factors to all staff, patient, and family/Monitor for mental status changes and reorient to person, place, and time, as needed/Move patient closer to nursing station/within visual sight of ED staff/Provide visual cue: yellow wristband, yellow gown, etc/Reinforce activity limits and safety measures with patient and family/Toileting schedule using arm’s reach rule for commode and bathroom/Use of alarms - bed, stretcher, chair and/or video monitoring/Call bell, personal items and telephone in reach/Instruct patient to call for assistance before getting out of bed/chair/stretcher/Non-slip footwear applied when patient is off stretcher/[place] to call system/Physically safe environment - no spills, clutter or unnecessary equipment/Purposeful Proactive Rounding/Room/bathroom lighting operational, light cord in reach

## 2025-05-28 NOTE — ED PROVIDER NOTE - PATIENT PORTAL LINK FT
You can access the FollowMyHealth Patient Portal offered by [facility] by registering at the following website: [email or web address]. By joining [employer]’s FollowMyHealth portal, you will also be able to view your health information using other applications (apps) compatible with our system.
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
## 2025-05-28 NOTE — ED PROVIDER NOTE - NSFOLLOWUPINSTRUCTIONS_ED_ALL_ED_FT
There are no signs of emergency conditions requiring admission to the hospital on today's workup.  Based on the evaluation, a presumptive diagnosis was made, however, further evaluation may be required by your primary care physician or a specialist for a more definitive diagnosis.  Therefore, please follow-up as directed or return to the Emergency Department if your symptoms change or worsen.    We recommend that you:  1. See your primary care physician within the next 72 hours for follow up.  Bring a copy of your discharge paperwork (including any test results) to your doctor.  2. ACE wrap for compression, ice, elevate  3. Tylenol as needed for pain      *** Return immediately if you have worsening symptoms, chest pain, Shortness of Breath, abdominal pain, Nausea/Vomiting/Diarrhea, dizziness, weakness, confusion, severe headache, vision changes, urinary symptoms, syncope, falls, trauma, discharge, bleeding, fevers, or any other new/concerning symptoms. ***

## 2025-05-28 NOTE — ED PROVIDER NOTE - PHYSICAL EXAMINATION
Family
On Physical Exam:  General: well appearing, in NAD, cooperative, A&Ox2 (baseline)  HEENT: PERRL, MMM  Neck: no neck tenderness, no nuchal rigidity  Cardiac: normal s1, s2; RRR; no MGR  Lungs: CTABL  Skin: significant ecchymosis to R forearm, wrist, hand  Extremities:   RUE: significant ecchymosis, strong radial pulse, cap refill <2s, compartments soft, FROM elbow/wrist/hand, sensation intact  Neuro: at baseline

## 2025-05-28 NOTE — PHYSICAL THERAPY INITIAL EVALUATION ADULT - ASSISTIVE DEVICE FOR TRANSFER: SIT/STAND, REHAB EVAL
Medicare Wellness  Personal Prevention Plan of Service     Date of Office Visit:    Encounter Provider:  JUAN FRANCISCO Nguyen  Place of Service:  [facility]  Patient Name: Rosi Vicente  :  1948    As part of the Medicare Wellness portion of your visit today, we are providing you with this personalized preventive plan of services (PPPS). This plan is based upon recommendations of the [place] Preventive Services Task Force (USPSTF) and the Advisory Committee on Immunization Practices (ACIP).    This lists the preventive care services that should be considered, and provides dates of when you are due. Items listed as completed are up-to-date and do not require any further intervention.    Health Maintenance   Topic Date Due    TDAP/TD VACCINES (2 - Td or Tdap) 2023    COVID-19 Vaccine ( season) 2025    INFLUENZA VACCINE  2025    DXA SCAN  04/15/2026    LIPID PANEL  2026    MAMMOGRAM  2026    ANNUAL WELLNESS VISIT  2026    GASTROSCOPY (EGD)  2028    COLORECTAL CANCER SCREENING  2035    HEPATITIS C SCREENING  Completed    RSV Vaccine - Adults  Completed    Pneumococcal Vaccine 50+  Completed    ZOSTER VACCINE  Completed       No orders of the defined types were placed in this encounter.      Return in about 6 months (around 2025).          Fall Prevention in the Home, Adult  Falls can cause injuries and affect people of all ages. There are many simple things that you can do to make your home safe and to help prevent falls.  If you need it, ask for help making these changes.  What actions can I take to prevent falls?  General information  Use good lighting in all rooms. Make sure to:  Replace any light bulbs that burn out.  Turn on lights if it is dark and use night-lights.  Keep items that you use often in easy-to-reach places. Lower the shelves around your home if needed.  Move furniture so that there  are clear paths around it.  Do not keep throw rugs or other things on the floor that can make you trip.  If any of your floors are uneven, fix them.  Add color or contrast paint or tape to clearly jerzy and help you see:  Grab bars or handrails.  First and last steps of staircases.  Where the edge of each step is.  If you use a ladder or stepladder:  Make sure that it is fully opened. Do not climb a closed ladder.  Make sure the sides of the ladder are locked in place.  Have someone hold the ladder while you use it.  Know where your pets are as you move through your home.  What can I do in the bathroom?         Keep the floor dry. Clean up any water that is on the floor right away.  Remove soap buildup in the bathtub or shower. Buildup makes bathtubs and showers slippery.  Use non-skid mats or decals on the floor of the bathtub or shower.  Attach bath mats securely with double-sided, non-slip rug tape.  If you need to sit down while you are in the shower, use a non-slip stool.  Install grab bars by the toilet and in the bathtub and shower. Do not use towel bars as grab bars.  What can I do in the bedroom?  Make sure that you have a light by your bed that is easy to reach.  Do not use any sheets or blankets on your bed that hang to the floor.  Have a firm bench or chair with side arms that you can use for support when you get dressed.  What can I do in the kitchen?  Clean up any spills right away.  If you need to reach something above you, use a sturdy step stool that has a grab bar.  Keep electrical cables out of the way.  Do not use floor polish or wax that makes floors slippery.  What can I do with my stairs?  Do not leave anything on the stairs.  Make sure that you have a light switch at the top and the bottom of the stairs. Have them installed if you do not have them.  Make sure that there are handrails on both sides of the stairs. Fix handrails that are broken or loose. Make sure that handrails are as long as  the staircases.  Install non-slip stair treads on all stairs in your home if they do not have carpet.  Avoid having throw rugs at the top or bottom of stairs, or secure the rugs with carpet tape to prevent them from moving.  Choose a carpet design that does not hide the edge of steps on the stairs. Make sure that carpet is firmly attached to the stairs. Fix any carpet that is loose or worn.  What can I do on the outside of my home?  Use bright outdoor lighting.  Repair the edges of walkways and driveways and fix any cracks. Clear paths of anything that can make you trip, such as tools or rocks.  Add color or contrast paint or tape to clearly jerzy and help you see high doorway thresholds.  Trim any bushes or trees on the main path into your home.  Check that handrails are securely fastened and in good repair. Both sides of all steps should have handrails.  Install guardrails along the edges of any raised decks or porches.  Have leaves, snow, and ice cleared regularly. Use sand, salt, or ice melt on walkways during winter months if you live where there is ice and snow.  In the garage, clean up any spills right away, including grease or oil spills.  What other actions can I take?  Review your medicines with your health care provider. Some medicines can make you confused or feel dizzy. This can increase your chance of falling.  Wear closed-toe shoes that fit well and support your feet. Wear shoes that have rubber soles and low heels.  Use a cane, walker, scooter, or crutches that help you move around if needed.  Talk with your provider about other ways that you can decrease your risk of falls. This may include seeing a physical therapist to learn to do exercises to improve movement and strength.  Where to find more information  Centers for Disease Control and Prevention, [place]AD[place]: cdc.gov  [place] on Aging: lele.nih.gov  [place] on Aging: lele.nih.gov  Contact a health care provider if:  You are afraid  of falling at home.  You feel weak, drowsy, or dizzy at home.  You fall at home.  Get help right away if you:  Lose consciousness or have trouble moving after a fall.  Have a fall that causes a head injury.  These symptoms may be an emergency. Get help right away. Call 911.  Do not wait to see if the symptoms will go away.  Do not drive yourself to the hospital.  This information is not intended to replace advice given to you by your health care provider. Make sure you discuss any questions you have with your health care provider.  Document Revised: 08/21/2023 Document Reviewed: 08/21/2023  [employer] Patient Education © 2024 [employer]  Sit-to-Stand Exercise    The sit-to-stand exercise (also known as the chair stand or chair rise exercise) strengthens your lower body and helps you maintain or improve your mobility and independence. The end goal is to do the sit-to-stand exercise without using your hands. This will be easier as you become stronger. You should always talk with your health care provider before starting any exercise program, especially if you have had recent surgery.  Do the exercise exactly as told by your health care provider and adjust it as directed. It is normal to feel mild stretching, pulling, tightness, or discomfort as you do this exercise, but you should stop right away if you feel sudden pain or your pain gets worse. Do not begin doing this exercise until told by your health care provider.  What the sit-to-stand exercise does  The sit-to-stand exercise helps to strengthen the muscles in your thighs and the muscles in the center of your body that give you stability (core muscles). This exercise is especially helpful if:  You have had knee or hip surgery.  You have trouble getting up from a chair, out of a car, or off the toilet due to muscle weakness.  How to do the sit-to-stand exercise  Sit toward the front edge of a sturdy chair without armrests. Your knees should be bent and your feet  should be flat on the floor and shoulder-width apart and underneath your hips.  Place your hands lightly on each side of the seat. Keep your back and neck as straight as possible, with your chest slightly forward.  Breathe in slowly. Lean forward and slightly shift your weight to the front of your feet.  Breathe out as you slowly stand up. Try not to support any weight with your hands.  Stand and pause for a full breath in and out.  Breathe in as you sit down slowly. Tighten your core and abdominal muscles to control your lowering as much as possible. You should lower yourself back to the chair slowly, not just drop back into the seat.  Breathe out slowly.  Do this exercise 10-15 times. If needed, do it fewer times until you build up strength.  Rest for 1 minute, then do another set of 10-15 repetitions.  To change the difficulty of the sit-to-stand exercise  If the exercise is too difficult, use a chair with sturdy armrests, and push off the armrests to help you come to the standing position. You can also use the armrests to help slowly lower yourself back to sitting. As this gets easier, try to use your arms less. You can also place a firm cushion or pillow on the chair to make the surface higher.  If this exercise is too easy, do not use your arms to help raise or lower yourself. You can also wear a weighted vest, use hand weights, increase your repetitions, or try a lower chair.  General tips  You may feel tired when starting an exercise routine. This is normal.  You may have muscle soreness that lasts a few days. This is normal. As you get stronger, you may not feel muscle soreness.  Use smooth, steady movements.  Do not  hold your breath during strength exercises. This can cause unsafe changes in your blood pressure.  Breathe in slowly through your nose, and breathe out slowly through your mouth.  Summary  Strengthening your lower body is an important step to help you move safely and independently.  The  sit-to-stand exercise helps strengthen the muscles in your thighs and core.  You should always talk with your health care provider before starting any exercise program, especially if you have had recent surgery.  This information is not intended to replace advice given to you by your health care provider. Make sure you discuss any questions you have with your health care provider.  Document Revised: 04/10/2022 Document Reviewed: 04/10/2022  [employer] Patient Education © 2024 [employer]  Exercising to Stay Healthy  To become healthy and stay healthy, it is recommended that you do moderate-intensity and vigorous-intensity exercise. You can tell that you are exercising at a moderate intensity if your heart starts beating faster and you start breathing faster but can still hold a conversation. You can tell that you are exercising at a vigorous intensity if you are breathing much harder and faster and cannot hold a conversation while exercising.  How can exercise benefit me?  Exercising regularly is important. It has many health benefits, such as:  Improving overall fitness, flexibility, and endurance.  Increasing bone density.  Helping with weight control.  Decreasing body fat.  Increasing muscle strength and endurance.  Reducing stress and tension, anxiety, depression, or anger.  Improving overall health.  What guidelines should I follow while exercising?  Before you start a new exercise program, talk with your health care provider.  Do not exercise so much that you hurt yourself, feel dizzy, or get very short of breath.  Wear comfortable clothes and wear shoes with good support.  Drink plenty of water while you exercise to prevent dehydration or heat stroke.  Work out until your breathing and your heartbeat get faster (moderate intensity).  How often should I exercise?  Choose an activity that you enjoy, and set realistic goals. Your health care provider can help you make an activity plan that is individually  designed and works best for you.  Exercise regularly as told by your health care provider. This may include:  Doing strength training two times a week, such as:  Lifting weights.  Using resistance bands.  Push-ups.  Sit-ups.  Yoga.  Doing a certain intensity of exercise for a given amount of time. Choose from these options:  A total of 150 minutes of moderate-intensity exercise every week.  A total of 75 minutes of vigorous-intensity exercise every week.  A mix of moderate-intensity and vigorous-intensity exercise every week.  Children, pregnant women, people who have not exercised regularly, people who are overweight, and older adults may need to talk with a health care provider about what activities are safe to perform. If you have a medical condition, be sure to talk with your health care provider before you start a new exercise program.  What are some exercise ideas?  Moderate-intensity exercise ideas include:  Walking 1 mile (1.6 km) in about 15 minutes.  Biking.  Hiking.  Golfing.  Dancing.  Water aerobics.  Vigorous-intensity exercise ideas include:  Walking 4.5 miles (7.2 km) or more in about 1 hour.  Jogging or running 5 miles (8 km) in about 1 hour.  Biking 10 miles (16.1 km) or more in about 1 hour.  Lap swimming.  Roller-skating or in-line skating.  Cross-country skiing.  Vigorous competitive sports, such as football, basketball, and soccer.  Jumping rope.  Aerobic dancing.  What are some everyday activities that can help me get exercise?  Yard work, such as:  Pushing a .  Raking and bagging leaves.  Washing your car.  Pushing a stroller.  Shoveling snow.  Gardening.  Washing windows or floors.  How can I be more active in my day-to-day activities?  Use stairs instead of an elevator.  Take a walk during your lunch break.  If you drive, park your car farther away from your work or school.  If you take public transportation, get off one stop early and walk the rest of the way.  Stand up or walk  around during all of your indoor phone calls.  Get up, stretch, and walk around every 30 minutes throughout the day.  Enjoy exercise with a friend. Support to continue exercising will help you keep a regular routine of activity.  Where to find more information  You can find more information about exercising to stay healthy from:  U.S. Department of Health and Human Services: www.hhs.gov  Centers for Disease Control and Prevention (CDC): www.cdc.gov  Summary  Exercising regularly is important. It will improve your overall fitness, flexibility, and endurance.  Regular exercise will also improve your overall health. It can help you control your weight, reduce stress, and improve your bone density.  Do not exercise so much that you hurt yourself, feel dizzy, or get very short of breath.  Before you start a new exercise program, talk with your health care provider.  This information is not intended to replace advice given to you by your health care provider. Make sure you discuss any questions you have with your health care provider.  Document Revised: 04/15/2022 Document Reviewed: 04/15/2022  [employer] Patient Education © 2024 [employer]   rolling walker

## 2025-05-28 NOTE — ED PROVIDER NOTE - CLINICAL SUMMARY MEDICAL DECISION MAKING FREE TEXT BOX
89 y/o M with afib on eliquis with contusion to R arm. Considered compartment syndrome however no changes in temperature, comparments are soft, pt has good pulses, FROM hand. Will obtain imaging and reassess pending results.

## 2025-05-28 NOTE — ED PROVIDER NOTE - OBJECTIVE STATEMENT
89 y/o M with PMH of afib on eliquis presents BIB aide Yaritza c/o R hand and arm swelling x 5 days. History mostly obtained from aide as independent historian 2/2 pt dementia. Per aide, pt became agitated and struck his hand onto a wall. the next morning the hand and arm became swollen and painful and they called 911, pt was taken to [facility] ED and had negative imaging and was dx'ed with contusion. Pt went to his PCP this AM who recommended him come into ED for further w/u. Aide gave pt Tylenol 2 hours PTA. 89 y/o M with PMH of afib on eliquis presents BIB aidneeraj Vigil c/o R hand and arm swelling x 5 days. History mostly obtained from aide as independent historian 2/2 pt dementia. Per aide, pt became agitated and struck his hand onto a wall. the next morning the hand and arm became swollen and painful and they called 911, pt was taken to [facility] ED and had negative imaging and was dx'ed with contusion (aide does not have d/c papers). Pt went to his PCP this AM who recommended him come into ED for further w/u. Aide gave pt Tylenol 2 hours PTA. 89 y/o M with PMH of afib on eliquis presents BIB elliott Vigil c/o R hand and arm swelling x 5 days. History mostly obtained from aide as independent historian 2/2 pt dementia. Per aide, pt became agitated and struck his hand onto a wall. the next morning the hand and arm became swollen and painful and they called 911, pt was taken to [facility] ED and had negative imaging and was dx'ed with contusion (aide does not have d/c papers). Pt went to his PCP this AM who recommended him come into ED for further w/u. Aide gave pt Tylenol 2 hours PTA.    Attendin-year-old male presents with swelling and ecchymosis to the right hand.  Patient is on Eliquis and struck his hand on the wall on 4 Friday when he became agitated.  He is at increased swelling to the hand and forearm since then.  He has full range of motion of the hand.  His pulses are intact.  His compartments are soft.

## 2025-05-28 NOTE — ED ADULT TRIAGE NOTE - CHIEF COMPLAINT QUOTE
Pt history of dementia, ayaz (on eliquis) presents from MD office for swelling and pain to rt arm. Pt was agitated this past weekend and was swinging his arms and may have struck his arm against an object. Pt was evaluated at [facility] and discharged. Swelling has progressed to rt hand. Accompanied with aide.

## 2025-05-28 NOTE — CHART NOTE - NSCHARTNOTEFT_GEN_A_CORE
was informed by provider that patient is discharged and needs assistance with getting transportation home.  confirmed home address with aide at bedside.       schedule ambulance through [employer] ([phone]). Patient will be pick-up within the hour; trip # [phone].  informed provider of above. No further social work intervention needed.

## 2025-05-28 NOTE — ED ADULT NURSE NOTE - CHIEF COMPLAINT QUOTE
Pt history of dementia, ayaz (on eliquis) presents from MD office for swelling and pain to rt arm. Pt was agitated this past weekend and was swinging his arms and may have struck his arm against an object. Pt was evaluated at [facility] and discharged. Swelling has progressed to rt hand. Accompanied with aide.

## 2025-05-29 ENCOUNTER — NON-APPOINTMENT (OUTPATIENT)
Age: 89
End: 2025-05-29

## 2025-05-29 LAB
ALBUMIN SERPL ELPH-MCNC: 4.4 G/DL
ALP BLD-CCNC: 109 U/L
ALT SERPL-CCNC: 14 U/L
ANION GAP SERPL CALC-SCNC: 15 MMOL/L
AST SERPL-CCNC: 16 U/L
BASOPHILS # BLD AUTO: 0.06 K/UL
BASOPHILS NFR BLD AUTO: 0.9 %
BILIRUB SERPL-MCNC: 0.3 MG/DL
BUN SERPL-MCNC: 26 MG/DL
CALCIUM SERPL-MCNC: 9 MG/DL
CHLORIDE SERPL-SCNC: 102 MMOL/L
CO2 SERPL-SCNC: 20 MMOL/L
CREAT SERPL-MCNC: 1.52 MG/DL
EGFRCR SERPLBLD CKD-EPI 2021: 39 ML/MIN/1.73M2
EOSINOPHIL # BLD AUTO: 0.18 K/UL
EOSINOPHIL NFR BLD AUTO: 2.6 %
GLUCOSE SERPL-MCNC: 109 MG/DL
HCT VFR BLD CALC: 27.2 %
HGB BLD-MCNC: 8.8 G/DL
IMM GRANULOCYTES NFR BLD AUTO: 0.3 %
LYMPHOCYTES # BLD AUTO: 1.62 K/UL
LYMPHOCYTES NFR BLD AUTO: 23.6 %
MAN DIFF?: NORMAL
MCHC RBC-ENTMCNC: 32.4 G/DL
MCHC RBC-ENTMCNC: 33.5 PG
MCV RBC AUTO: 103.4 FL
MONOCYTES # BLD AUTO: 0.97 K/UL
MONOCYTES NFR BLD AUTO: 14.1 %
NEUTROPHILS # BLD AUTO: 4.01 K/UL
NEUTROPHILS NFR BLD AUTO: 58.5 %
PLATELET # BLD AUTO: 189 K/UL
POTASSIUM SERPL-SCNC: 4.7 MMOL/L
PROT SERPL-MCNC: 6.4 G/DL
RBC # BLD: 2.63 M/UL
RBC # FLD: 13.7 %
SODIUM SERPL-SCNC: 137 MMOL/L
WBC # FLD AUTO: 6.86 K/UL

## 2025-06-11 ENCOUNTER — APPOINTMENT (OUTPATIENT)
Dept: GERIATRICS | Facility: CLINIC | Age: 89
End: 2025-06-11
Payer: MEDICARE

## 2025-06-11 VITALS
HEART RATE: 72 BPM | DIASTOLIC BLOOD PRESSURE: 56 MMHG | HEIGHT: 66 IN | OXYGEN SATURATION: 96 % | WEIGHT: 129 LBS | SYSTOLIC BLOOD PRESSURE: 149 MMHG | BODY MASS INDEX: 20.73 KG/M2 | RESPIRATION RATE: 14 BRPM | TEMPERATURE: 97.3 F

## 2025-06-11 PROBLEM — Z71.89 ACP (ADVANCE CARE PLANNING): Status: ACTIVE | Noted: 2025-06-11

## 2025-06-11 PROBLEM — S41.119A SKIN TEAR OF UPPER EXTREMITY: Status: ACTIVE | Noted: 2025-06-11

## 2025-06-11 PROCEDURE — 99497 ADVNCD CARE PLAN 30 MIN: CPT

## 2025-06-11 PROCEDURE — 99215 OFFICE O/P EST HI 40 MIN: CPT

## 2025-06-26 ENCOUNTER — APPOINTMENT (OUTPATIENT)
Dept: GERIATRICS | Facility: CLINIC | Age: 89
End: 2025-06-26
Payer: MEDICARE

## 2025-06-26 VITALS
OXYGEN SATURATION: 96 % | HEART RATE: 64 BPM | BODY MASS INDEX: 20.82 KG/M2 | WEIGHT: 129 LBS | SYSTOLIC BLOOD PRESSURE: 120 MMHG | DIASTOLIC BLOOD PRESSURE: 58 MMHG | RESPIRATION RATE: 16 BRPM | TEMPERATURE: 97.3 F

## 2025-06-26 PROCEDURE — 99214 OFFICE O/P EST MOD 30 MIN: CPT

## 2025-06-27 LAB
ALBUMIN SERPL ELPH-MCNC: 3.9 G/DL
ALP BLD-CCNC: 104 U/L
ALT SERPL-CCNC: 14 U/L
ANION GAP SERPL CALC-SCNC: 13 MMOL/L
AST SERPL-CCNC: 20 U/L
BILIRUB SERPL-MCNC: 0.3 MG/DL
BUN SERPL-MCNC: 26 MG/DL
CALCIUM SERPL-MCNC: 8.9 MG/DL
CHLORIDE SERPL-SCNC: 101 MMOL/L
CO2 SERPL-SCNC: 20 MMOL/L
CREAT SERPL-MCNC: 1.66 MG/DL
EGFRCR SERPLBLD CKD-EPI 2021: 35 ML/MIN/1.73M2
GLUCOSE SERPL-MCNC: 105 MG/DL
HCT VFR BLD CALC: 25 %
HGB BLD-MCNC: 8.3 G/DL
MCHC RBC-ENTMCNC: 33.2 G/DL
MCHC RBC-ENTMCNC: 33.9 PG
MCV RBC AUTO: 102 FL
PLATELET # BLD AUTO: 164 K/UL
POTASSIUM SERPL-SCNC: 4.3 MMOL/L
PROT SERPL-MCNC: 6.1 G/DL
RBC # BLD: 2.45 M/UL
RBC # FLD: 13.2 %
SODIUM SERPL-SCNC: 134 MMOL/L
WBC # FLD AUTO: 6.36 K/UL

## 2025-07-01 ENCOUNTER — APPOINTMENT (OUTPATIENT)
Dept: GERIATRICS | Facility: CLINIC | Age: 89
End: 2025-07-01

## 2025-07-08 ENCOUNTER — NON-APPOINTMENT (OUTPATIENT)
Age: 89
End: 2025-07-08

## 2025-07-16 ENCOUNTER — EMERGENCY (EMERGENCY)
Facility: HOSPITAL | Age: 89
LOS: 1 days | End: 2025-07-16
Attending: STUDENT IN AN ORGANIZED HEALTH CARE EDUCATION/TRAINING PROGRAM
Payer: MEDICARE

## 2025-07-16 VITALS
TEMPERATURE: 98 F | WEIGHT: 130.07 LBS | HEART RATE: 61 BPM | RESPIRATION RATE: 16 BRPM | OXYGEN SATURATION: 100 % | SYSTOLIC BLOOD PRESSURE: 124 MMHG | DIASTOLIC BLOOD PRESSURE: 55 MMHG

## 2025-07-16 VITALS
RESPIRATION RATE: 17 BRPM | TEMPERATURE: 98 F | OXYGEN SATURATION: 96 % | HEART RATE: 65 BPM | DIASTOLIC BLOOD PRESSURE: 54 MMHG | SYSTOLIC BLOOD PRESSURE: 130 MMHG

## 2025-07-16 DIAGNOSIS — Z95.1 PRESENCE OF AORTOCORONARY BYPASS GRAFT: Chronic | ICD-10-CM

## 2025-07-16 PROCEDURE — 72170 X-RAY EXAM OF PELVIS: CPT | Mod: 26

## 2025-07-16 PROCEDURE — 99284 EMERGENCY DEPT VISIT MOD MDM: CPT | Mod: GC

## 2025-07-16 PROCEDURE — 73130 X-RAY EXAM OF HAND: CPT | Mod: 26,RT

## 2025-07-16 PROCEDURE — 73090 X-RAY EXAM OF FOREARM: CPT | Mod: 26,RT

## 2025-07-16 PROCEDURE — 71045 X-RAY EXAM CHEST 1 VIEW: CPT | Mod: 26

## 2025-07-16 PROCEDURE — 73030 X-RAY EXAM OF SHOULDER: CPT | Mod: 26,RT

## 2025-07-16 PROCEDURE — 70450 CT HEAD/BRAIN W/O DYE: CPT | Mod: 26

## 2025-07-16 PROCEDURE — 72125 CT NECK SPINE W/O DYE: CPT | Mod: 26

## 2025-07-16 PROCEDURE — 73080 X-RAY EXAM OF ELBOW: CPT | Mod: 26,RT

## 2025-07-16 PROCEDURE — 73110 X-RAY EXAM OF WRIST: CPT | Mod: 26,RT

## 2025-07-16 PROCEDURE — 73060 X-RAY EXAM OF HUMERUS: CPT | Mod: 26,RT

## 2025-07-16 RX ORDER — ACETAMINOPHEN 500 MG/5ML
975 LIQUID (ML) ORAL ONCE
Refills: 0 | Status: COMPLETED | OUTPATIENT
Start: 2025-07-16 | End: 2025-07-16

## 2025-07-16 RX ORDER — BACITRACIN 500 UNIT/G
1 OINTMENT (GRAM) TOPICAL ONCE
Refills: 0 | Status: DISCONTINUED | OUTPATIENT
Start: 2025-07-16 | End: 2025-07-20

## 2025-07-16 RX ADMIN — Medication 975 MILLIGRAM(S): at 16:52

## 2025-07-29 ENCOUNTER — APPOINTMENT (OUTPATIENT)
Dept: GERIATRICS | Facility: CLINIC | Age: 89
End: 2025-07-29
Payer: MEDICARE

## 2025-07-29 VITALS
BODY MASS INDEX: 20.75 KG/M2 | HEART RATE: 69 BPM | WEIGHT: 129.13 LBS | RESPIRATION RATE: 16 BRPM | DIASTOLIC BLOOD PRESSURE: 54 MMHG | OXYGEN SATURATION: 95 % | HEIGHT: 66 IN | TEMPERATURE: 98.2 F | SYSTOLIC BLOOD PRESSURE: 144 MMHG

## 2025-07-29 DIAGNOSIS — I48.91 UNSPECIFIED ATRIAL FIBRILLATION: ICD-10-CM

## 2025-07-29 DIAGNOSIS — K21.9 GASTRO-ESOPHAGEAL REFLUX DISEASE W/OUT ESOPHAGITIS: ICD-10-CM

## 2025-07-29 DIAGNOSIS — F03.918 UNSPECIFIED DEMENTIA, UNSPECIFIED SEVERITY, WITH OTHER BEHAVIORAL DISTURBANCE: ICD-10-CM

## 2025-07-29 DIAGNOSIS — L03.90 CELLULITIS, UNSPECIFIED: ICD-10-CM

## 2025-07-29 DIAGNOSIS — D64.9 ANEMIA, UNSPECIFIED: ICD-10-CM

## 2025-07-29 DIAGNOSIS — N40.0 BENIGN PROSTATIC HYPERPLASIA WITHOUT LOWER URINARY TRACT SYMPMS: ICD-10-CM

## 2025-07-29 DIAGNOSIS — I10 ESSENTIAL (PRIMARY) HYPERTENSION: ICD-10-CM

## 2025-07-29 PROCEDURE — 99215 OFFICE O/P EST HI 40 MIN: CPT

## 2025-07-29 PROCEDURE — G2211 COMPLEX E/M VISIT ADD ON: CPT

## 2025-07-29 RX ORDER — CEPHALEXIN 500 MG/1
500 CAPSULE ORAL
Qty: 14 | Refills: 0 | Status: ACTIVE | COMMUNITY
Start: 2025-07-29 | End: 1900-01-01

## 2025-08-05 PROBLEM — K21.9 GERD (GASTROESOPHAGEAL REFLUX DISEASE): Status: ACTIVE | Noted: 2025-08-04

## 2025-08-07 ENCOUNTER — RX RENEWAL (OUTPATIENT)
Age: 89
End: 2025-08-07

## 2025-08-14 ENCOUNTER — NON-APPOINTMENT (OUTPATIENT)
Age: 89
End: 2025-08-14

## 2025-09-02 ENCOUNTER — APPOINTMENT (OUTPATIENT)
Dept: GERIATRICS | Facility: CLINIC | Age: 89
End: 2025-09-02
Payer: MEDICARE

## 2025-09-02 VITALS
RESPIRATION RATE: 14 BRPM | OXYGEN SATURATION: 96 % | WEIGHT: 129.13 LBS | HEART RATE: 64 BPM | SYSTOLIC BLOOD PRESSURE: 116 MMHG | TEMPERATURE: 98.1 F | DIASTOLIC BLOOD PRESSURE: 58 MMHG

## 2025-09-02 DIAGNOSIS — I48.91 UNSPECIFIED ATRIAL FIBRILLATION: ICD-10-CM

## 2025-09-02 DIAGNOSIS — E78.5 HYPERLIPIDEMIA, UNSPECIFIED: ICD-10-CM

## 2025-09-02 DIAGNOSIS — L98.9 DISORDER OF THE SKIN AND SUBCUTANEOUS TISSUE, UNSPECIFIED: ICD-10-CM

## 2025-09-02 DIAGNOSIS — T14.8XXA OTHER INJURY OF UNSPECIFIED BODY REGION, INITIAL ENCOUNTER: ICD-10-CM

## 2025-09-02 DIAGNOSIS — I25.10 ATHEROSCLEROTIC HEART DISEASE OF NATIVE CORONARY ARTERY W/OUT ANGINA PECTORIS: ICD-10-CM

## 2025-09-02 DIAGNOSIS — D64.9 ANEMIA, UNSPECIFIED: ICD-10-CM

## 2025-09-02 DIAGNOSIS — I10 ESSENTIAL (PRIMARY) HYPERTENSION: ICD-10-CM

## 2025-09-02 PROCEDURE — 99214 OFFICE O/P EST MOD 30 MIN: CPT

## 2025-09-03 LAB
ALBUMIN SERPL ELPH-MCNC: 4.3 G/DL
ALP BLD-CCNC: 106 U/L
ALT SERPL-CCNC: 13 U/L
ANION GAP SERPL CALC-SCNC: 15 MMOL/L
AST SERPL-CCNC: 16 U/L
BASOPHILS # BLD AUTO: 0.06 K/UL
BASOPHILS NFR BLD AUTO: 0.8 %
BILIRUB SERPL-MCNC: 0.4 MG/DL
BUN SERPL-MCNC: 28 MG/DL
CALCIUM SERPL-MCNC: 8.9 MG/DL
CHLORIDE SERPL-SCNC: 101 MMOL/L
CO2 SERPL-SCNC: 20 MMOL/L
CREAT SERPL-MCNC: 1.6 MG/DL
EGFRCR SERPLBLD CKD-EPI 2021: 37 ML/MIN/1.73M2
EOSINOPHIL # BLD AUTO: 0.28 K/UL
EOSINOPHIL NFR BLD AUTO: 3.9 %
GLUCOSE SERPL-MCNC: 98 MG/DL
HCT VFR BLD CALC: 29.2 %
HGB BLD-MCNC: 9.4 G/DL
IMM GRANULOCYTES NFR BLD AUTO: 0.3 %
LYMPHOCYTES # BLD AUTO: 1.88 K/UL
LYMPHOCYTES NFR BLD AUTO: 26.1 %
MAN DIFF?: NORMAL
MCHC RBC-ENTMCNC: 32.2 G/DL
MCHC RBC-ENTMCNC: 32.4 PG
MCV RBC AUTO: 100.7 FL
MONOCYTES # BLD AUTO: 0.95 K/UL
MONOCYTES NFR BLD AUTO: 13.2 %
NEUTROPHILS # BLD AUTO: 4.01 K/UL
NEUTROPHILS NFR BLD AUTO: 55.7 %
PLATELET # BLD AUTO: 183 K/UL
POTASSIUM SERPL-SCNC: 4.8 MMOL/L
PROT SERPL-MCNC: 6.2 G/DL
RBC # BLD: 2.9 M/UL
RBC # FLD: 13.1 %
SODIUM SERPL-SCNC: 136 MMOL/L
WBC # FLD AUTO: 7.2 K/UL

## 2025-09-12 ENCOUNTER — APPOINTMENT (OUTPATIENT)
Dept: GERIATRICS | Facility: CLINIC | Age: 89
End: 2025-09-12
Payer: MEDICARE

## 2025-09-12 VITALS — SYSTOLIC BLOOD PRESSURE: 115 MMHG | DIASTOLIC BLOOD PRESSURE: 60 MMHG | OXYGEN SATURATION: 94 % | HEART RATE: 64 BPM

## 2025-09-12 DIAGNOSIS — F03.918 UNSPECIFIED DEMENTIA, UNSPECIFIED SEVERITY, WITH OTHER BEHAVIORAL DISTURBANCE: ICD-10-CM
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
## 2025-09-12 PROCEDURE — 99349 HOME/RES VST EST MOD MDM 40: CPT
